# Patient Record
Sex: FEMALE | Race: WHITE | NOT HISPANIC OR LATINO | Employment: OTHER | URBAN - METROPOLITAN AREA
[De-identification: names, ages, dates, MRNs, and addresses within clinical notes are randomized per-mention and may not be internally consistent; named-entity substitution may affect disease eponyms.]

---

## 2017-02-04 ENCOUNTER — OFFICE VISIT (OUTPATIENT)
Dept: URGENT CARE | Facility: PHYSICIAN GROUP | Age: 72
End: 2017-02-04
Payer: MEDICARE

## 2017-02-04 VITALS
HEIGHT: 66 IN | WEIGHT: 200 LBS | HEART RATE: 106 BPM | DIASTOLIC BLOOD PRESSURE: 58 MMHG | BODY MASS INDEX: 32.14 KG/M2 | OXYGEN SATURATION: 92 % | SYSTOLIC BLOOD PRESSURE: 124 MMHG | RESPIRATION RATE: 16 BRPM | TEMPERATURE: 98.3 F

## 2017-02-04 DIAGNOSIS — N30.90 CYSTITIS: ICD-10-CM

## 2017-02-04 PROCEDURE — 3014F SCREEN MAMMO DOC REV: CPT | Performed by: PHYSICIAN ASSISTANT

## 2017-02-04 PROCEDURE — G8484 FLU IMMUNIZE NO ADMIN: HCPCS | Performed by: PHYSICIAN ASSISTANT

## 2017-02-04 PROCEDURE — 1036F TOBACCO NON-USER: CPT | Performed by: PHYSICIAN ASSISTANT

## 2017-02-04 PROCEDURE — 1101F PT FALLS ASSESS-DOCD LE1/YR: CPT | Performed by: PHYSICIAN ASSISTANT

## 2017-02-04 PROCEDURE — G8419 CALC BMI OUT NRM PARAM NOF/U: HCPCS | Performed by: PHYSICIAN ASSISTANT

## 2017-02-04 PROCEDURE — 99214 OFFICE O/P EST MOD 30 MIN: CPT | Performed by: PHYSICIAN ASSISTANT

## 2017-02-04 PROCEDURE — G8432 DEP SCR NOT DOC, RNG: HCPCS | Performed by: PHYSICIAN ASSISTANT

## 2017-02-04 PROCEDURE — 3017F COLORECTAL CA SCREEN DOC REV: CPT | Mod: 8P | Performed by: PHYSICIAN ASSISTANT

## 2017-02-04 PROCEDURE — 4040F PNEUMOC VAC/ADMIN/RCVD: CPT | Mod: 8P | Performed by: PHYSICIAN ASSISTANT

## 2017-02-04 RX ORDER — CIPROFLOXACIN 500 MG/1
500 TABLET, FILM COATED ORAL 2 TIMES DAILY
Qty: 14 TAB | Refills: 0 | Status: SHIPPED | OUTPATIENT
Start: 2017-02-04 | End: 2017-02-11

## 2017-02-04 ASSESSMENT — PATIENT HEALTH QUESTIONNAIRE - PHQ9: CLINICAL INTERPRETATION OF PHQ2 SCORE: 0

## 2017-02-05 NOTE — PROGRESS NOTES
Subjective:      Judy Bland is a 71 y.o. female who presents with Dysuria            Dysuria       Chief Complaint   Patient presents with   • Dysuria     x3 days took over the counter medicine now worse as of 12pm today       HPI:  Judy Bland is a 71 y.o. female who presents with  who presents with dysuria and increased frequency and urgency x 3 days.  Took otc Azo and gentamicin BID x 2 days.  This improved slightly an now it has worsened.  No fever or chills..  Patient denies HA, SOB, chest pain, palpitations, fever, chills, or n/v/d.      Past Medical History   Diagnosis Date   • Arthritis    • Heart burn    • Hepatitis B    • Hypertension    • Acquired polycythemia 2/15/2012   • Benign essential hypertension 2/15/2012   • Difficulty breathing 2/15/2012   • Cough 2/15/2012   • Hypercholesterolemia 2/15/2012   • Murmur 2/15/2012   • Obesity 2/15/2012   • Sinus tachycardia 2/15/2012   • Arthropathy 2/15/2012   • Chronic hepatitis B 2/15/2012   • Esophageal reflux 2/15/2012   • Hyperlipidemia 2/15/2012   • Jaundice    • Indigestion    • Anesthesia      PONV   • BACILIO (obstructive sleep apnea) 2/15/2012       Past Surgical History   Procedure Laterality Date   • Cataract extraction with iol  2008     [facility]   • Rotator cuff repair  2005     left   • Cervical conization       Loop Electrode Excision   • Primary c section     • Knee arthroplasty total  4/8/08     Performed by ROHAN BLACKWELL at SURGERY [facility]   • Knee arthroplasty total  4/8/08     Performed by ROHAN BLACKWELL at SURGERY [facility]   • Carpal tunnel release  2009     b/L   • Trigger finger release  2005     left   • Trigger finger release  11/23/2009     Performed by MEHDI ONEIL at SURGERY [facility]   • Hand surgery     • Septoplasty  3/12/2012     Performed by MEHDI SWAIN at SURGERY SAME DAY [facility]   • Turbinoplasty  3/12/2012     Performed by MEHDI SWAIN at SURGERY SAME DAY [facility]   •  Sinusotomies  3/12/2012     Performed by MEHDI SWAIN at SURGERY SAME DAY [facility]       Family History   Problem Relation Age of Onset   • Diabetes     • Heart Disease     • Hypertension     • Stroke     • Other Mother      Family hx of Coronary Arteriosclerosis   • Other Father      Family hx of Coronary Arteriosclerosis       Social History     Social History   • Marital Status:      Spouse Name: N/A   • Number of Children: N/A   • Years of Education: N/A     Occupational History   • Not on file.     Social History Main Topics   • Smoking status: Never Smoker    • Smokeless tobacco: Never Used   • Alcohol Use: No   • Drug Use: No   • Sexual Activity: Not on file     Other Topics Concern   • Not on file     Social History Narrative         Current outpatient prescriptions:   •  diltiazem, 60 mg, Oral, Q12HRS  •  doxepin,   •  montelukast, 10 mg, Oral, Q EVENING  •  metformin, 500 mg, Oral, DAILY  •  Doxepin HCl, 3 Tab, Oral, QHS  •  diltiazem, TAKE ONE TABLET BY MOUTH EVERY 12 HOURS  •  atorvastatin,   •  lisinopril-hydrochlorothiazide,   •  budesonide-formoterol, 2 Puff, Inhalation, BID  •  ranitidine, 150 mg, Oral, DAILY  •  Cetirizine HCl (ZYRTEC PO), Take  by mouth.  •  spironolactone/hctz, TAKE ONE TABLET BY MOUTH EVERY DAY  •  montelukast, 10 mg, Oral, DAILY  •  simvastatin, 20 mg, Oral, DAILY  •  Albuterol Sulfate (VENTOLIN HFA INH), Inhale  by mouth as needed.  •  hydrOXYzine, , not taking  •  pantoprazole, 40 mg, Oral, BID, not taking  •  Acetaminophen (TYLENOL PO), Take  by mouth. EXTRA STRENGH 2 TABS PRN , prn  •  alprazolam, 0.5 mg, Oral, QDAY PRN, prn  •  fluticasone, 1 Spray, Nasal, BID, prn    Allergies   Allergen Reactions   • Ultram [Tramadol Hcl]      ELEVATED LIVER ENZYMES            Review of Systems   Genitourinary: Positive for dysuria.   Review of Systems   Constitutional: Negative for fever, chills and weight loss.   HENT: Negative.    Respiratory: Negative.    Cardiovascular:  "Negative.    Gastrointestinal: Negative.  Negative for nausea, vomiting and abdominal pain.   Genitourinary: Positive for dysuria, urgency and frequency. Negative for hematuria and flank pain.   Musculoskeletal: Negative for back pain.   Skin: Negative.    Neurological: Negative.    Endo/Heme/Allergies: Negative.    Psychiatric/Behavioral: Negative.             Objective:     /58 mmHg  Pulse 106  Temp(Src) 36.8 °C (98.3 °F)  Resp 16  Ht 1.676 m (5' 6\")  Wt 90.719 kg (200 lb)  BMI 32.30 kg/m2  SpO2 92%  Breastfeeding? No     Physical Exam       Constitutional:   Appropriately groomed, pleasant affect, well nourished, in NAD.    Head:   Normocephalic, atraumatic.    Eyes:   EOM's full, sclera white, conjunctiva not erythematous, and medial canthus without exudate bilaterally.    Throat:  Dentition wnl, mucosa moist without lesions.      Lungs:  Respiratory effort not labored without accessory muscle use.      Abdominal:  Abdomen soft to palpation with mild suprapubic ttp.  No masses or hepatosplenomegaly.  No CVA tenderness bilaterally to percussion.    Musculoskeletal:  Gait nonantalgic with a narrow base.    Derm:  Skin without rashes or lesions with good turgor pressure.      Psychiatric:  Mood, affect, and judgement appropriate.       Assessment/Plan:     1. Cystitis  Patient presents with uti x 3 days.  Self treated with 2 days of gentamicin [place].  On exam slight ttp over suprapubic region.  UA pos for leuk and blood.  Sent urine for culture.  Rx cipro.  Reviewed symptom support measures.    Patient was in agreement with this treatment plan and seemed to understand without barriers. All questions were encouraged and answered.  Reviewed signs and symptoms of when to seek emergency medical care.     Please note that this dictation was created using voice recognition software.  I have made every reasonable attempt to correct obvious errors, but I expect there are errors of [place] " and possibly content that I did not discover before finalizing the note.   - ciprofloxacin (CIPRO) 500 MG Tab; Take 1 Tab by mouth 2 times a day for 7 days.  Dispense: 14 Tab; Refill: 0  - URINE CULTURE(NEW); Future

## 2017-02-05 NOTE — PATIENT INSTRUCTIONS
You have a urinary tract infection.  Please  your antibiotic from the pharmacy.  Eat yogurt with each dosing and for several days after finishing your antibiotic.  Drink plenty of fluids and empty your bladder often.  Try acidic juices to lower your urine pH.  I recommend craneberry, promegranate, and cherry juices.  Go to the ER if you develop fever of 101F or greater, flank pain, blood in your urine, or nausea and/or vomiting.

## 2017-02-10 ENCOUNTER — HOSPITAL ENCOUNTER (OUTPATIENT)
Facility: MEDICAL CENTER | Age: 72
End: 2017-02-10
Attending: PLASTIC SURGERY
Payer: MEDICARE

## 2017-02-10 PROCEDURE — 88305 TISSUE EXAM BY PATHOLOGIST: CPT

## 2017-03-16 ENCOUNTER — TELEPHONE (OUTPATIENT)
Dept: CARDIOLOGY | Facility: MEDICAL CENTER | Age: 72
End: 2017-03-16

## 2017-03-16 ENCOUNTER — OFFICE VISIT (OUTPATIENT)
Dept: CARDIOLOGY | Facility: MEDICAL CENTER | Age: 72
End: 2017-03-16
Payer: MEDICARE

## 2017-03-16 ENCOUNTER — HOSPITAL ENCOUNTER (OUTPATIENT)
Dept: RADIOLOGY | Facility: MEDICAL CENTER | Age: 72
End: 2017-03-16
Attending: INTERNAL MEDICINE
Payer: MEDICARE

## 2017-03-16 VITALS
HEIGHT: 66 IN | OXYGEN SATURATION: 91 % | SYSTOLIC BLOOD PRESSURE: 112 MMHG | BODY MASS INDEX: 34.07 KG/M2 | DIASTOLIC BLOOD PRESSURE: 58 MMHG | HEART RATE: 123 BPM | WEIGHT: 212 LBS

## 2017-03-16 DIAGNOSIS — R06.09 DOE (DYSPNEA ON EXERTION): ICD-10-CM

## 2017-03-16 DIAGNOSIS — E78.00 HYPERCHOLESTEROLEMIA: Chronic | ICD-10-CM

## 2017-03-16 DIAGNOSIS — R00.0 SINUS TACHYCARDIA: Chronic | ICD-10-CM

## 2017-03-16 DIAGNOSIS — I10 BENIGN ESSENTIAL HYPERTENSION: Chronic | ICD-10-CM

## 2017-03-16 LAB — EKG IMPRESSION: NORMAL

## 2017-03-16 PROCEDURE — 71020 DX-CHEST-2 VIEWS: CPT

## 2017-03-16 PROCEDURE — 4040F PNEUMOC VAC/ADMIN/RCVD: CPT | Mod: 8P | Performed by: INTERNAL MEDICINE

## 2017-03-16 PROCEDURE — 1036F TOBACCO NON-USER: CPT | Performed by: INTERNAL MEDICINE

## 2017-03-16 PROCEDURE — G8419 CALC BMI OUT NRM PARAM NOF/U: HCPCS | Performed by: INTERNAL MEDICINE

## 2017-03-16 PROCEDURE — 3014F SCREEN MAMMO DOC REV: CPT | Performed by: INTERNAL MEDICINE

## 2017-03-16 PROCEDURE — 93000 ELECTROCARDIOGRAM COMPLETE: CPT | Performed by: INTERNAL MEDICINE

## 2017-03-16 PROCEDURE — G8484 FLU IMMUNIZE NO ADMIN: HCPCS | Performed by: INTERNAL MEDICINE

## 2017-03-16 PROCEDURE — 99214 OFFICE O/P EST MOD 30 MIN: CPT | Performed by: INTERNAL MEDICINE

## 2017-03-16 PROCEDURE — G8432 DEP SCR NOT DOC, RNG: HCPCS | Performed by: INTERNAL MEDICINE

## 2017-03-16 PROCEDURE — 1101F PT FALLS ASSESS-DOCD LE1/YR: CPT | Performed by: INTERNAL MEDICINE

## 2017-03-16 PROCEDURE — 3017F COLORECTAL CA SCREEN DOC REV: CPT | Mod: 8P | Performed by: INTERNAL MEDICINE

## 2017-03-16 ASSESSMENT — ENCOUNTER SYMPTOMS
MYALGIAS: 0
SHORTNESS OF BREATH: 1
PND: 0
DIZZINESS: 0
ORTHOPNEA: 0
PALPITATIONS: 0
LOSS OF CONSCIOUSNESS: 0

## 2017-03-16 NOTE — PROGRESS NOTES
Subjective:   Judy Bland is a 71 y.o. female who presents today hypertension, tachycardia with a history of asthma, obstructive sleep apnea and exertional shortness of breath, hyperlipidemia.    Last seen on 3/11/2016.    No specific cardiac symptoms though does have some exertional shortness of breath which generally has been chronic.  Has asthma uses daily inhalers.  No specific angina.  Tries to walk some every day  No PND orthopnea or lower extremity edema.  Compliant with her medications.    In December, 2014, saw her pulmonologist.  PFTs showed fracture of being overweight but no obstructive disease.  Takes medicines for asthma diagnosed 3 years ago.  A lot of problems with Kenalog causing eczema than had to be on long-term prednisone.    No history of CAD or angina.  Previous echocardiograms had shown left ventricular hypertrophy but no valve disease. Right heart pressures could not be evaluated.  Had a remote stress echocardiogram that was apparently unremarkable.    Past Medical History   Diagnosis Date   • Arthritis    • Heart burn    • Hepatitis B    • Hypertension    • Acquired polycythemia 2/15/2012   • Benign essential hypertension 2/15/2012   • Difficulty breathing 2/15/2012   • Cough 2/15/2012   • Hypercholesterolemia 2/15/2012   • Murmur 2/15/2012   • Obesity 2/15/2012   • Sinus tachycardia 2/15/2012   • Arthropathy 2/15/2012   • Chronic hepatitis B (CMS-HCC) 2/15/2012   • Esophageal reflux 2/15/2012   • Hyperlipidemia 2/15/2012   • Jaundice    • Indigestion    • Anesthesia      PONV   • BACILIO (obstructive sleep apnea) 2/15/2012     Past Surgical History   Procedure Laterality Date   • Cataract extraction with iol  2008     [facility]   • Rotator cuff repair  2005     left   • Cervical conization       Loop Electrode Excision   • Primary c section     • Knee arthroplasty total  4/8/08     Performed by ROHAN BLACKWELL at SURGERY [facility]   • Knee arthroplasty total  4/8/08     Performed by  ROHAN BLACKWELL at SURGERY [facility]   • Carpal tunnel release  2009     b/L   • Trigger finger release  2005     left   • Trigger finger release  11/23/2009     Performed by MEHDI ONEIL at SURGERY [facility]   • Hand surgery     • Septoplasty  3/12/2012     Performed by MEHDI SWAIN at SURGERY SAME DAY [facility]   • Turbinoplasty  3/12/2012     Performed by MEHDI SWAIN at SURGERY SAME DAY [facility]   • Sinusotomies  3/12/2012     Performed by MEHDI SWAIN at SURGERY SAME DAY [facility]     Family History   Problem Relation Age of Onset   • Diabetes     • Heart Disease     • Hypertension     • Stroke     • Other Mother      Family hx of Coronary Arteriosclerosis   • Other Father      Family hx of Coronary Arteriosclerosis     History   Smoking status   • Never Smoker    Smokeless tobacco   • Never Used     Allergies   Allergen Reactions   • Ultram [Tramadol Hcl]      ELEVATED LIVER ENZYMES     Outpatient Encounter Prescriptions as of 3/16/2017   Medication Sig Dispense Refill   • diltiazem (CARDIZEM) 60 MG Tab Take 1 Tab by mouth every 12 hours. 180 Tab 3   • doxepin (SINEQUAN) 25 MG Cap      • montelukast (SINGULAIR) 10 MG Tab Take 1 Tab by mouth every evening. 90 Tab 3   • metformin (GLUCOPHAGE) 500 MG Tab Take 500 mg by mouth every day.     • Doxepin HCl 3 MG Tab Take 3 Tabs by mouth every bedtime.     • diltiazem (CARDIZEM) 60 MG Tab TAKE ONE TABLET BY MOUTH EVERY 12 HOURS 180 Tab 3   • atorvastatin (LIPITOR) 20 MG Tab      • lisinopril-hydrochlorothiazide (PRINZIDE, ZESTORETIC) 20-12.5 MG per tablet      • budesonide-formoterol (SYMBICORT) 160-4.5 MCG/ACT AERO Inhale 2 Puffs by mouth 2 Times a Day. RINSE MOUTH AFTER USE     • ranitidine (ZANTAC) 150 MG TABS Take 150 mg by mouth every day.     • Cetirizine HCl (ZYRTEC PO) Take  by mouth.     • spironolactone/hctz (ALDACTAZIDE) 25-25 MG TABS TAKE ONE TABLET BY MOUTH EVERY DAY 90 Tab 1   • montelukast (SINGULAIR) 10 MG TABS Take 10  "mg by mouth every day.       • simvastatin (ZOCOR) 20 MG TABS Take 1 Tab by mouth every day. 30 Each 11   • Acetaminophen (TYLENOL PO) Take  by mouth. EXTRA STRENGH 2 TABS PRN      • Albuterol Sulfate (VENTOLIN HFA INH) Inhale  by mouth as needed.     • alprazolam (XANAX) 0.5 MG TABS Take 0.5 mg by mouth 1 time daily as needed.     • fluticasone (FLONASE) 50 MCG/ACT nasal spray Spray 1 Spray in nose 2 times a day. Each Nostril      • hydrOXYzine (ATARAX) 10 MG Tab      • pantoprazole (PROTONIX) 40 MG TBEC Take 40 mg by mouth 2 times a day.         No facility-administered encounter medications on file as of 3/16/2017.     Review of Systems   Respiratory: Positive for shortness of breath.    Cardiovascular: Negative for chest pain, palpitations, orthopnea, leg swelling and PND.   Musculoskeletal: Negative for myalgias.   Neurological: Negative for dizziness and loss of consciousness.        Objective:   /58 mmHg  Pulse 123  Ht 1.676 m (5' 5.98\")  Wt 96.163 kg (212 lb)  BMI 34.23 kg/m2  SpO2 91%    Physical Exam   Constitutional: She is oriented to person, place, and time. She appears well-nourished. No distress.   HENT:   Head: Normocephalic and atraumatic.   Mouth/Throat: Mucous membranes are normal.   Eyes: EOM are normal.   Neck: No JVD present. Carotid bruit is not present.   Slightly elevated jugular venous pressure.   Cardiovascular: Normal rate, regular rhythm, S1 normal and S2 normal.  Exam reveals no gallop and no friction rub.    No murmur heard.  Pulses:       Carotid pulses are 2+ on the right side, and 2+ on the left side.       Radial pulses are 2+ on the right side, and 2+ on the left side.        Posterior tibial pulses are 1+ on the right side, and 1+ on the left side.       Pulmonary/Chest: Effort normal. She has no wheezes. She has no rhonchi. She has rales.   Increased AP diameter.  Decreased breath sounds.   Abdominal: Soft. Bowel sounds are normal. She exhibits no abdominal bruit, no " pulsatile midline mass and no mass. There is no hepatosplenomegaly. There is no tenderness.   Obese.   Musculoskeletal: She exhibits no edema.   Neurological: She is alert and oriented to person, place, and time. She has normal strength. Gait normal.   Skin: Skin is warm and dry. No cyanosis. Nails show no clubbing.   Psychiatric: She has a normal mood and affect. Her behavior is normal.       Assessment:     1. Sinus tachycardia  EKG   2. Benign essential hypertension     3. Hypercholesterolemia     4. LOLYD (dyspnea on exertion)  DX-CHEST-2 VIEWS     03/4/2011 echocardiogram:  EF greater than 65%. Mild to moderate left hypertrophy. Pulmonary pressures cannot be assessed.    03/06/2015 ECHOCARDIOGRAM  Normal left ventricular systolic function.  Normal regional wall motion with vigorous global contractility.  Left ventricular ejection fraction is 65% to 70%.  Normal right ventricular systolic function.  Aortic sclerosis without stenosis.  Right ventricular systolic pressure is estimated to be 24-29 mmHg.  Anterior echo free space with some enhanced echogenicity of the   posterior pericardium; uncertain significance.     01/18/2016 Cholesterol 159. Triglycerides 155. HDL 61. LDL 67.    03/16/2017 EKG: Normal sinus rhythm, rate 99. Isolated PVC.    Medical Decision Making:  Today's Assessment / Status / Plan:     Shortness of breath with exertion. Probably multifactorial predominantly pulmonary.    Hypertension. Controlled.    Hyperlipidemia. I reviewed her most recent lipid panel with her. On simvastatin.    Asthma.    Sleep apnea on CPAP.    Recommendations  CXR.  Continue current therapy.  Follow-up one year, sooner if necessary.

## 2017-03-16 NOTE — MR AVS SNAPSHOT
"        Judy Bland   3/16/2017 1:00 PM   Office Visit   MRN: [id number]    Department:  [facility]   Dept Phone:  [phone]    Description:  Female : 1945   Provider:  Lit Irby M.D.           Reason for Visit     Follow-Up           Allergies as of 3/16/2017     Allergen Noted Reactions    Ultram [Tramadol Hcl] 2011       ELEVATED LIVER ENZYMES      You were diagnosed with     Sinus tachycardia   [131718]       Benign essential hypertension   [578200]       Hypercholesterolemia   [392126]       LLOYD (dyspnea on exertion)   [094780]         Vital Signs     Blood Pressure Pulse Height Weight Body Mass Index Oxygen Saturation    112/58 mmHg 123 1.676 m (5' 5.98\") 96.163 kg (212 lb) 34.23 kg/m2 91%    Smoking Status                   Never Smoker            Basic Information     Date Of Birth Sex Race Ethnicity Preferred Language    1945 Female White Non- English      Your appointments     Mar 24, 2017  2:00 PM   MA [id number] with [facility] MG 3   [facility] ([address])    [address]  [place] [address]   [phone]           No deodorant, powder, perfume or lotion under the arm or breast area.            2017  1:00 PM   Established Patient Pul with MARKOS Kaplan   [facility] (--)    [address]  [address]  [place] [address] [phone]              Problem List              ICD-10-CM Priority Class Noted - Resolved    Acquired polycythemia (Chronic) D75.1   2/15/2012 - Present    Benign essential hypertension (Chronic) I10   2/15/2012 - Present    Difficulty breathing R06.89   2/15/2012 - Present    Cough (Chronic) R05   2/15/2012 - Present    Hypercholesterolemia (Chronic) E78.00   2/15/2012 - Present    Murmur (Chronic) R01.1   2/15/2012 - Present    Obesity (Chronic) E66.9   2/15/2012 - Present    BACILIO (obstructive sleep apnea) (Chronic) G47.33   2/15/2012 - Present    Sinus " tachycardia (Chronic) R00.0   2/15/2012 - Present    Arthropathy (Chronic) M12.9   2/15/2012 - Present    Chronic hepatitis B (CMS-HCC) (Chronic) B18.1   2/15/2012 - Present    Esophageal reflux (Chronic) K21.9   2/15/2012 - Present    Hyperlipidemia (Chronic) E78.5   2/15/2012 - Present    Shortness of breath R06.02   2/26/2015 - Present    Allergic rhinitis due to allergen J30.9   6/13/2016 - Present    LLOYD (dyspnea on exertion) R06.09   3/16/2017 - Present      Health Maintenance        Date Due Completion Dates    IMM DTaP/Tdap/Td Vaccine (1 - Tdap) 8/20/1964 ---    PAP SMEAR 8/20/1966 ---    COLONOSCOPY 8/20/1995 ---    IMM ZOSTER VACCINE 8/20/2005 ---    IMM PNEUMOCOCCAL 65+ (ADULT) LOW/MEDIUM RISK SERIES (1 of 2 - PCV13) 8/20/2010 ---    IMM INFLUENZA (1) 9/1/2016 ---    MAMMOGRAM 3/4/2017 3/4/2016, 2/25/2015, 2/20/2014, 2/7/2013, 2/3/2012, 1/27/2011, 1/22/2010, 1/22/2010, 1/21/2009, 1/21/2009, 1/17/2008, 1/17/2008, 1/15/2007, 1/11/2006, 1/10/2005    BONE DENSITY 12/29/2019 12/29/2014, 4/7/2009            Results       Current Immunizations     No immunizations on file.      Below and/or attached are the medications your provider expects you to take. Review all of your home medications and newly ordered medications with your provider and/or pharmacist. Follow medication instructions as directed by your provider and/or pharmacist. Please keep your medication list with you and share with your provider. Update the information when medications are discontinued, doses are changed, or new medications (including over-the-counter products) are added; and carry medication information at all times in the event of emergency situations     Allergies:  ULTRAM - (reactions not documented)               Medications  Valid as of: March 16, 2017 -  1:51 PM    Generic Name Brand Name Tablet Size Instructions for use    Acetaminophen   Take  by mouth. EXTRA STRENGH 2 TABS PRN         Albuterol Sulfate   Inhale  by mouth as  needed.        ALPRAZolam (Tab) XANAX 0.5 MG Take 0.5 mg by mouth 1 time daily as needed.        Atorvastatin Calcium (Tab) LIPITOR 20 MG         Budesonide-Formoterol Fumarate (Aerosol) SYMBICORT 160-4.5 MCG/ACT Inhale 2 Puffs by mouth 2 Times a Day. RINSE MOUTH AFTER USE        Cetirizine HCl   Take  by mouth.        DiltiaZEM HCl (Tab) CARDIZEM 60 MG TAKE ONE TABLET BY MOUTH EVERY 12 HOURS        DiltiaZEM HCl (Tab) CARDIZEM 60 MG Take 1 Tab by mouth every 12 hours.        Doxepin HCl (Tab) Doxepin HCl 3 MG Take 3 Tabs by mouth every bedtime.        Doxepin HCl (Cap) SINEQUAN 25 MG         Fluticasone Propionate (Suspension) FLONASE 50 MCG/ACT Spray 1 Spray in nose 2 times a day. Each Nostril         HydrOXYzine HCl (Tab) ATARAX 10 MG         Lisinopril-Hydrochlorothiazide (Tab) PRINZIDE, ZESTORETIC 20-12.5 MG         MetFORMIN HCl (Tab) GLUCOPHAGE 500 MG Take 500 mg by mouth every day.        Montelukast Sodium (Tab) SINGULAIR 10 MG Take 10 mg by mouth every day.          Montelukast Sodium (Tab) SINGULAIR 10 MG Take 1 Tab by mouth every evening.        Pantoprazole Sodium (Tablet Delayed Response) PROTONIX 40 MG Take 40 mg by mouth 2 times a day.          RaNITidine HCl (Tab) ZANTAC 150 MG Take 150 mg by mouth every day.        Simvastatin (Tab) ZOCOR 20 MG Take 1 Tab by mouth every day.        Spironolactone-HCTZ (Tab) ALDACTAZIDE 25-25 MG TAKE ONE TABLET BY MOUTH EVERY DAY        .                 Medicines prescribed today were sent to:     [id number] [place] - [address] AT [address] [address]    Phone: [phone] Fax: [phone]    Open 24 Hours?: No    [facility] [place] - [address] AT PORTAL TO REGISTERED [place] [address] [facility] [address]    Phone: [phone] Fax: [phone]    Open 24 Hours?: No      Medication refill instructions:       If your prescription bottle indicates you have  medication refills left, it is not necessary to call your provider’s office. Please contact your pharmacy and they will refill your medication.    If your prescription bottle indicates you do not have any refills left, you may request refills at any time through one of the following ways: The online "[employer] system (except Urgent Care), by calling your provider’s office, or by asking your pharmacy to contact your provider’s office with a refill request. Medication refills are processed only during regular business hours and may not be available until the next business day. Your provider may request additional information or to have a follow-up visit with you prior to refilling your medication.   *Please Note: Medication refills are assigned a new Rx number when refilled electronically. Your pharmacy may indicate that no refills were authorized even though a new prescription for the same medication is available at the pharmacy. Please request the medicine by name with the pharmacy before contacting your provider for a refill.        Your To Do List     Future Labs/Procedures Complete By Expires    DX-CHEST-2 VIEWS  As directed 9/14/2017      Other Notes About Your Plan     [facility]. [phone] Fax. [phone]             "[employer] Access Code: [email or web address]  Expires: 4/15/2017  1:51 PM    "[employer]  A secure, online tool to manage your health information     [employer]’s "[employer]® is a secure, online tool that connects you to your personalized health information from the privacy of your home -- day or night - making it very easy for you to manage your healthcare. Once the activation process is completed, you can even access your medical information using the "[employer] sebastian, which is available for free in the Apple Sebastian store or Google Play store.     "[employer] provides the following levels of access (as shown below):   My Chart Features   [facility] Primary Care Doctor [facility]  Specialists  [facility]  Urgent  Care [facility]  Primary Care  Doctor   Email your healthcare team securely and privately 24/7 X X X    Manage appointments: schedule your next appointment; view details of past/upcoming appointments X      Request prescription refills. X      View recent personal medical records, including lab and immunizations X X X X   View health record, including health history, allergies, medications X X X X   Read reports about your outpatient visits, procedures, consult and ER notes X X X X   See your discharge summary, which is a recap of your hospital and/or ER visit that includes your diagnosis, lab results, and care plan. X X       How to register for [employer]:  1. Go to  [email or web address].  2. Click on the Sign Up Now box, which takes you to the New Member Sign Up page. You will need to provide the following information:  a. Enter your [employer] Access Code exactly as it appears at the top of this page. (You will not need to use this code after you’ve completed the sign-up process. If you do not sign up before the expiration date, you must request a new code.)   b. Enter your date of birth.   c. Enter your home email address.   d. Click Submit, and follow the next screen’s instructions.  3. Create a [employer] ID. This will be your [employer] login ID and cannot be changed, so think of one that is secure and easy to remember.  4. Create a [employer] password. You can change your password at any time.  5. Enter your Password Reset Question and Answer. This can be used at a later time if you forget your password.   6. Enter your e-mail address. This allows you to receive e-mail notifications when new information is available in [employer].  7. Click Sign Up. You can now view your health information.    For assistance activating your [employer] account, call [phone]

## 2017-03-16 NOTE — Clinical Note
[facility]   [address] [place] [address]  [place] [address]  Phone: [phone]  Fax: [phone]              Judy Bland  1945    Encounter Date: 3/16/2017    Lit Irby M.D.          PROGRESS NOTE:  Subjective:   Judy Bland is a 71 y.o. female who presents today hypertension, tachycardia with a history of asthma, obstructive sleep apnea and exertional shortness of breath, hyperlipidemia.    Last seen on 3/11/2016.    No specific cardiac symptoms though does have some exertional shortness of breath which generally has been chronic.  Has asthma uses daily inhalers.  No specific angina.  Tries to walk some every day  No PND orthopnea or lower extremity edema.  Compliant with her medications.    In December, 2014, saw her pulmonologist.  PFTs showed fracture of being overweight but no obstructive disease.  Takes medicines for asthma diagnosed 3 years ago.  A lot of problems with Kenalog causing eczema than had to be on long-term prednisone.    No history of CAD or angina.  Previous echocardiograms had shown left ventricular hypertrophy but no valve disease. Right heart pressures could not be evaluated.  Had a remote stress echocardiogram that was apparently unremarkable.    Past Medical History   Diagnosis Date   • Arthritis    • Heart burn    • Hepatitis B    • Hypertension    • Acquired polycythemia 2/15/2012   • Benign essential hypertension 2/15/2012   • Difficulty breathing 2/15/2012   • Cough 2/15/2012   • Hypercholesterolemia 2/15/2012   • Murmur 2/15/2012   • Obesity 2/15/2012   • Sinus tachycardia 2/15/2012   • Arthropathy 2/15/2012   • Chronic hepatitis B (CMS-HCC) 2/15/2012   • Esophageal reflux 2/15/2012   • Hyperlipidemia 2/15/2012   • Jaundice    • Indigestion    • Anesthesia      PONV   • BACILIO (obstructive sleep apnea) 2/15/2012     Past Surgical History   Procedure Laterality Date   • Cataract extraction with iol  2008     OU   • Rotator cuff repair  2005     left   • Cervical conization       Loop Electrode Excision   • Primary c section     • Knee arthroplasty total  4/8/08     Performed by ROHAN BLACKWELL at SUR[facility]S   • Knee arthroplasty total  4/8/08     Performed by ROHAN BLACKWELL at SUR[facility]ERY [facility] ORS   • Carpal tunnel release  2009     b/L   • Trigger finger release  2005     left   • Trigger finger release  11/23/2009     Performed by MEHDI ONEIL at [facility]   • Hand surgery     • Septoplasty  3/12/2012     Performed by MEHDI SWAIN at [facility]   • Turbinoplasty  3/12/2012     Performed by MEHDI SWAIN at [facility]   • Sinusotomies  3/12/2012     Performed by MEHDI SWAIN at [facility]     Family History   Problem Relation Age of Onset   • Diabetes     • Heart Disease     • Hypertension     • Stroke     • Other Mother      Family hx of Coronary Arteriosclerosis   • Other Father      Family hx of Coronary Arteriosclerosis     History   Smoking status   • Never Smoker    Smokeless tobacco   • Never Used     Allergies   Allergen Reactions   • Ultram [Tramadol Hcl]      ELEVATED LIVER ENZYMES     Outpatient Encounter Prescriptions as of 3/16/2017   Medication Sig Dispense Refill   • diltiazem (CARDIZEM) 60 MG Tab Take 1 Tab by mouth every 12 hours. 180 Tab 3   • doxepin (SINEQUAN) 25 MG Cap      • montelukast (SINGULAIR) 10 MG Tab Take 1 Tab by mouth every evening. 90 Tab 3   • metformin (GLUCOPHAGE) 500 MG Tab Take 500 mg by mouth every day.     • Doxepin HCl 3 MG Tab Take 3 Tabs by mouth every bedtime.     • diltiazem (CARDIZEM) 60 MG Tab TAKE ONE TABLET BY MOUTH EVERY 12 HOURS 180 Tab 3   • atorvastatin (LIPITOR) 20 MG Tab      • lisinopril-hydrochlorothiazide (PRINZIDE, ZESTORETIC) 20-12.5 MG per tablet      • budesonide-formoterol (SYMBICORT) 160-4.5 MCG/ACT AERO Inhale 2 Puffs by mouth 2 Times a Day. RINSE MOUTH AFTER  "USE     • ranitidine (ZANTAC) 150 MG TABS Take 150 mg by mouth every day.     • Cetirizine HCl (ZYRTEC PO) Take  by mouth.     • spironolactone/hctz (ALDACTAZIDE) 25-25 MG TABS TAKE ONE TABLET BY MOUTH EVERY DAY 90 Tab 1   • montelukast (SINGULAIR) 10 MG TABS Take 10 mg by mouth every day.       • simvastatin (ZOCOR) 20 MG TABS Take 1 Tab by mouth every day. 30 Each 11   • Acetaminophen (TYLENOL PO) Take  by mouth. EXTRA STRENGH 2 TABS PRN      • Albuterol Sulfate (VENTOLIN HFA INH) Inhale  by mouth as needed.     • alprazolam (XANAX) 0.5 MG TABS Take 0.5 mg by mouth 1 time daily as needed.     • fluticasone (FLONASE) 50 MCG/ACT nasal spray Spray 1 Spray in nose 2 times a day. Each Nostril      • hydrOXYzine (ATARAX) 10 MG Tab      • pantoprazole (PROTONIX) 40 MG TBEC Take 40 mg by mouth 2 times a day.         No facility-administered encounter medications on file as of 3/16/2017.     Review of Systems   Respiratory: Positive for shortness of breath.    Cardiovascular: Negative for chest pain, palpitations, orthopnea, leg swelling and PND.   Musculoskeletal: Negative for myalgias.   Neurological: Negative for dizziness and loss of consciousness.        Objective:   /58 mmHg  Pulse 123  Ht 1.676 m (5' 5.98\")  Wt 96.163 kg (212 lb)  BMI 34.23 kg/m2  SpO2 91%    Physical Exam   Constitutional: She is oriented to person, place, and time. She appears well-nourished. No distress.   HENT:   Head: Normocephalic and atraumatic.   Mouth/Throat: Mucous membranes are normal.   Eyes: EOM are normal.   Neck: No JVD present. Carotid bruit is not present.   Slightly elevated jugular venous pressure.   Cardiovascular: Normal rate, regular rhythm, S1 normal and S2 normal.  Exam reveals no gallop and no friction rub.    No murmur heard.  Pulses:       Carotid pulses are 2+ on the right side, and 2+ on the left side.       Radial pulses are 2+ on the right side, and 2+ on the left side.        Posterior tibial pulses are 1+ " on the right side, and 1+ on the left side.       Pulmonary/Chest: Effort normal. She has no wheezes. She has no rhonchi. She has rales.   Increased AP diameter.  Decreased breath sounds.   Abdominal: Soft. Bowel sounds are normal. She exhibits no abdominal bruit, no pulsatile midline mass and no mass. There is no hepatosplenomegaly. There is no tenderness.   Obese.   Musculoskeletal: She exhibits no edema.   Neurological: She is alert and oriented to person, place, and time. She has normal strength. Gait normal.   Skin: Skin is warm and dry. No cyanosis. Nails show no clubbing.   Psychiatric: She has a normal mood and affect. Her behavior is normal.       Assessment:     1. Sinus tachycardia  EKG   2. Benign essential hypertension     3. Hypercholesterolemia     4. LLOYD (dyspnea on exertion)  DX-CHEST-2 VIEWS     03/4/2011 echocardiogram:  EF greater than 65%. Mild to moderate left hypertrophy. Pulmonary pressures cannot be assessed.    03/06/2015 ECHOCARDIOGRAM  Normal left ventricular systolic function.  Normal regional wall motion with vigorous global contractility.  Left ventricular ejection fraction is 65% to 70%.  Normal right ventricular systolic function.  Aortic sclerosis without stenosis.  Right ventricular systolic pressure is estimated to be 24-29 mmHg.  Anterior echo free space with some enhanced echogenicity of the   posterior pericardium; uncertain significance.     01/18/2016 Cholesterol 159. Triglycerides 155. HDL 61. LDL 67.    03/16/2017 EKG: Normal sinus rhythm, rate 99. Isolated PVC.    Medical Decision Making:  Today's Assessment / Status / Plan:     Shortness of breath with exertion. Probably multifactorial predominantly pulmonary.    Hypertension. Controlled.    Hyperlipidemia. I reviewed her most recent lipid panel with her. On simvastatin.    Asthma.    Sleep apnea on CPAP.    Recommendations  CXR.  Continue current therapy.  Follow-up one year, sooner if necessary.          Ashli Brown,  A.P.N.  [address]  [place] [address]  VIA Facsimile: [phone]

## 2017-03-24 ENCOUNTER — HOSPITAL ENCOUNTER (OUTPATIENT)
Dept: RADIOLOGY | Facility: MEDICAL CENTER | Age: 72
End: 2017-03-24
Attending: NURSE PRACTITIONER
Payer: MEDICARE

## 2017-03-24 ENCOUNTER — TELEPHONE (OUTPATIENT)
Dept: PULMONOLOGY | Facility: HOSPICE | Age: 72
End: 2017-03-24

## 2017-03-24 DIAGNOSIS — G47.33 OBSTRUCTIVE SLEEP APNEA: ICD-10-CM

## 2017-03-24 DIAGNOSIS — Z13.9 SCREENING: ICD-10-CM

## 2017-03-24 PROCEDURE — 77063 BREAST TOMOSYNTHESIS BI: CPT

## 2017-03-27 DIAGNOSIS — J44.9 CHRONIC OBSTRUCTIVE PULMONARY DISEASE, UNSPECIFIED COPD TYPE (HCC): ICD-10-CM

## 2017-03-27 RX ORDER — BUDESONIDE AND FORMOTEROL FUMARATE DIHYDRATE 160; 4.5 UG/1; UG/1
AEROSOL RESPIRATORY (INHALATION)
Qty: 1 INHALER | Refills: 6 | Status: SHIPPED | OUTPATIENT
Start: 2017-03-27 | End: 2017-04-03 | Stop reason: SDUPTHER

## 2017-03-27 NOTE — TELEPHONE ENCOUNTER
Have we ever prescribed this med? Yes.  If yes, what date? NA    Last OV: 06/13/2016    Next OV: 06/13/2017    DX: COPD    Medications:   Requested Prescriptions     Pending Prescriptions Disp Refills   • SYMBICORT 160-4.5 MCG/ACT Aerosol [Pharmacy Med Name: SYMBICORT -4.5] 1 Inhaler 6     Sig: USE 2 INHALATIONS ORALLY   TWICE DAILY WITH SPACER,   RINSE MOUTH AFTER EACH USE

## 2017-04-03 DIAGNOSIS — J44.9 CHRONIC OBSTRUCTIVE PULMONARY DISEASE, UNSPECIFIED COPD TYPE (HCC): ICD-10-CM

## 2017-04-03 RX ORDER — BUDESONIDE AND FORMOTEROL FUMARATE DIHYDRATE 160; 4.5 UG/1; UG/1
AEROSOL RESPIRATORY (INHALATION)
Qty: 3 INHALER | Refills: 3 | Status: SHIPPED | OUTPATIENT
Start: 2017-04-03 | End: 2018-07-03 | Stop reason: SDUPTHER

## 2017-04-03 NOTE — TELEPHONE ENCOUNTER
Have we ever prescribed this med? Yes.  If yes, what date? 03/27/17    Last OV: 06/13/16    Next OV: 06/13/17    DX: COPD    Medications:   Requested Prescriptions     Pending Prescriptions Disp Refills   • budesonide-formoterol (SYMBICORT) 160-4.5 MCG/ACT Aerosol 3 Inhaler 3     Sig: USE 2 INHALATIONS ORALLY   TWICE DAILY WITH SPACER,   RINSE MOUTH AFTER EACH USE

## 2017-05-02 ENCOUNTER — TELEPHONE (OUTPATIENT)
Dept: PULMONOLOGY | Facility: HOSPICE | Age: 72
End: 2017-05-02

## 2017-05-02 DIAGNOSIS — G47.33 OSA (OBSTRUCTIVE SLEEP APNEA): ICD-10-CM

## 2017-07-12 DIAGNOSIS — I10 ESSENTIAL HYPERTENSION: ICD-10-CM

## 2017-07-12 RX ORDER — DILTIAZEM HYDROCHLORIDE 60 MG/1
TABLET, FILM COATED ORAL
Qty: 180 TAB | Refills: 3 | Status: SHIPPED | OUTPATIENT
Start: 2017-07-12 | End: 2018-03-23

## 2017-07-26 ENCOUNTER — NON-PROVIDER VISIT (OUTPATIENT)
Dept: PULMONOLOGY | Facility: HOSPICE | Age: 72
End: 2017-07-26
Payer: MEDICARE

## 2017-07-26 ENCOUNTER — OFFICE VISIT (OUTPATIENT)
Dept: PULMONOLOGY | Facility: HOSPICE | Age: 72
End: 2017-07-26
Payer: MEDICARE

## 2017-07-26 VITALS
TEMPERATURE: 98.4 F | BODY MASS INDEX: 34.07 KG/M2 | RESPIRATION RATE: 16 BRPM | SYSTOLIC BLOOD PRESSURE: 118 MMHG | WEIGHT: 212 LBS | DIASTOLIC BLOOD PRESSURE: 78 MMHG | OXYGEN SATURATION: 95 % | HEIGHT: 66 IN | HEART RATE: 92 BPM

## 2017-07-26 DIAGNOSIS — J45.30 MILD PERSISTENT ASTHMA WITHOUT COMPLICATION: ICD-10-CM

## 2017-07-26 DIAGNOSIS — R06.02 SHORTNESS OF BREATH: ICD-10-CM

## 2017-07-26 DIAGNOSIS — J30.9 ALLERGIC RHINITIS, UNSPECIFIED ALLERGIC RHINITIS TRIGGER, UNSPECIFIED RHINITIS SEASONALITY: ICD-10-CM

## 2017-07-26 DIAGNOSIS — E66.9 OBESITY (BMI 30-39.9): ICD-10-CM

## 2017-07-26 DIAGNOSIS — G47.33 OSA (OBSTRUCTIVE SLEEP APNEA): Chronic | ICD-10-CM

## 2017-07-26 PROCEDURE — 94726 PLETHYSMOGRAPHY LUNG VOLUMES: CPT | Performed by: INTERNAL MEDICINE

## 2017-07-26 PROCEDURE — 94729 DIFFUSING CAPACITY: CPT | Performed by: INTERNAL MEDICINE

## 2017-07-26 PROCEDURE — 94060 EVALUATION OF WHEEZING: CPT | Performed by: INTERNAL MEDICINE

## 2017-07-26 PROCEDURE — 99214 OFFICE O/P EST MOD 30 MIN: CPT | Performed by: NURSE PRACTITIONER

## 2017-07-26 RX ORDER — MONTELUKAST SODIUM 10 MG/1
10 TABLET ORAL EVERY EVENING
Qty: 90 TAB | Refills: 3 | Status: SHIPPED | OUTPATIENT
Start: 2017-07-26 | End: 2017-09-02 | Stop reason: SDUPTHER

## 2017-07-26 RX ORDER — METFORMIN HYDROCHLORIDE 500 MG/1
TABLET, EXTENDED RELEASE ORAL
COMMUNITY
Start: 2017-06-15 | End: 2018-05-08 | Stop reason: SDUPTHER

## 2017-07-26 ASSESSMENT — PULMONARY FUNCTION TESTS
FEV1_PERCENT_PREDICTED: 103
FEV1/FVC_PERCENT_PREDICTED: 111
FVC_PERCENT_PREDICTED: 93
FEV1/FVC_PERCENT_PREDICTED: 111
FEV1_PERCENT_PREDICTED: 112
FVC: 3.24
FEV1_PERCENT_CHANGE: 8
FEV1: 2.49
FEV1/FVC: 84
FVC_PREDICTED: 3.18
FEV1_PREDICTED: 2.4
FEV1/FVC_PERCENT_CHANGE: 89
FEV1/FVC: 83.33
FVC: 2.97
FEV1/FVC_PERCENT_PREDICTED: 75
FEV1: 2.7
FEV1_PERCENT_CHANGE: 9
FVC_PERCENT_PREDICTED: 101

## 2017-07-26 NOTE — MR AVS SNAPSHOT
Judy Bland   2017 2:00 PM   Non-Provider Visit   MRN: [id number]    Department:  [facility]   Dept Phone:  [phone]    Description:  Female : 1945   Provider:  Rachel Helm M.D.           Reason for Visit     Shortness of Breath           Allergies as of 2017     Allergen Noted Reactions    Ultram [Tramadol Hcl] 2011       ELEVATED LIVER ENZYMES      You were diagnosed with     Shortness of breath   [786.05.ICD-9-CM]         Vital Signs     Smoking Status                   Never Smoker            Basic Information     Date Of Birth Sex Race Ethnicity Preferred Language    1945 Female White Non- English      Your appointments     2017  3:20 PM   Established Patient Pul with MARKOS Kaplan   [facility] (--)    [address]  [address]  [facility] [id number] [phone]              Problem List              ICD-10-CM Priority Class Noted - Resolved    Acquired polycythemia (Chronic) D75.1   2/15/2012 - Present    Benign essential hypertension (Chronic) I10   2/15/2012 - Present    Difficulty breathing R06.89   2/15/2012 - Present    Cough (Chronic) R05   2/15/2012 - Present    Hypercholesterolemia (Chronic) E78.00   2/15/2012 - Present    Murmur (Chronic) R01.1   2/15/2012 - Present    Obesity (Chronic) E66.9   2/15/2012 - Present    BACLIIO (obstructive sleep apnea) (Chronic) G47.33   2/15/2012 - Present    Sinus tachycardia (Chronic) R00.0   2/15/2012 - Present    Arthropathy (Chronic) M12.9   2/15/2012 - Present    Chronic hepatitis B (CMS-HCC) (Chronic) B18.1   2/15/2012 - Present    Esophageal reflux (Chronic) K21.9   2/15/2012 - Present    Hyperlipidemia (Chronic) E78.5   2/15/2012 - Present    Shortness of breath R06.02   2015 - Present    Allergic rhinitis due to allergen J30.9   2016 - Present    LLOYD (dyspnea on exertion) R06.09   3/16/2017 - Present      Health Maintenance        Date Due  Completion Dates    IMM DTaP/Tdap/Td Vaccine (1 - Tdap) 8/20/1964 ---    PAP SMEAR 8/20/1966 ---    COLONOSCOPY 8/20/1995 ---    IMM ZOSTER VACCINE 8/20/2005 ---    IMM PNEUMOCOCCAL 65+ (ADULT) LOW/MEDIUM RISK SERIES (1 of 2 - PCV13) 8/20/2010 ---    IMM INFLUENZA (1) 9/1/2017 ---    MAMMOGRAM 3/24/2018 3/24/2017, 3/4/2016, 2/25/2015, 2/20/2014, 2/7/2013, 2/3/2012, 1/27/2011, 1/22/2010, 1/22/2010, 1/21/2009, 1/21/2009, 1/17/2008, 1/17/2008, 1/15/2007, 1/11/2006, 1/10/2005    BONE DENSITY 12/29/2019 12/29/2014, 4/7/2009            Current Immunizations     13-VALENT PCV PREVNAR 1/26/2017    Influenza TIV (IM) 12/12/2016      Below and/or attached are the medications your provider expects you to take. Review all of your home medications and newly ordered medications with your provider and/or pharmacist. Follow medication instructions as directed by your provider and/or pharmacist. Please keep your medication list with you and share with your provider. Update the information when medications are discontinued, doses are changed, or new medications (including over-the-counter products) are added; and carry medication information at all times in the event of emergency situations     Allergies:  ULTRAM - (reactions not documented)               Medications  Valid as of: July 26, 2017 -  2:31 PM    Generic Name Brand Name Tablet Size Instructions for use    Acetaminophen   Take  by mouth. EXTRA STRENGH 2 TABS PRN         Albuterol Sulfate   Inhale  by mouth as needed.        ALPRAZolam (Tab) XANAX 0.5 MG Take 0.5 mg by mouth 1 time daily as needed.        Atorvastatin Calcium (Tab) LIPITOR 20 MG         Budesonide-Formoterol Fumarate (Aerosol) SYMBICORT 160-4.5 MCG/ACT USE 2 INHALATIONS ORALLY   TWICE DAILY WITH SPACER,   RINSE MOUTH AFTER EACH USE        Cetirizine HCl   Take  by mouth.        DilTIAZem HCl (Tab) CARDIZEM 60 MG TAKE ONE TABLET BY MOUTH EVERY 12 HOURS        DilTIAZem HCl (Tab) CARDIZEM 60 MG TAKE 1 TABLET  EVERY 12     HOURS        Doxepin HCl (Tab) Doxepin HCl 3 MG Take 3 Tabs by mouth every bedtime.        Doxepin HCl (Cap) SINEQUAN 25 MG         Fluticasone Propionate (Suspension) FLONASE 50 MCG/ACT Spray 1 Spray in nose 2 times a day. Each Nostril         HydrOXYzine HCl (Tab) ATARAX 10 MG         Lisinopril-Hydrochlorothiazide (Tab) PRINZIDE, ZESTORETIC 20-12.5 MG         MetFORMIN HCl (Tab) GLUCOPHAGE 500 MG Take 500 mg by mouth every day.        Montelukast Sodium (Tab) SINGULAIR 10 MG Take 10 mg by mouth every day.          Montelukast Sodium (Tab) SINGULAIR 10 MG Take 1 Tab by mouth every evening.        Pantoprazole Sodium (Tablet Delayed Response) PROTONIX 40 MG Take 40 mg by mouth 2 times a day.          RaNITidine HCl (Tab) ZANTAC 150 MG Take 150 mg by mouth every day.        Simvastatin (Tab) ZOCOR 20 MG Take 1 Tab by mouth every day.        Spironolactone-HCTZ (Tab) ALDACTAZIDE 25-25 MG TAKE ONE TABLET BY MOUTH EVERY DAY        .                 Medicines prescribed today were sent to:     [facility] #[id number] - [place], [place] - [address] [place] [address] AT [address] [address]    Phone: [phone] Fax: [phone]    Open 24 Hours?: No    [facility] - [place], [place] - [address] AT [address]AL TO [place] [address] [address] [facility] [address]    Phone: [phone] Fax: [phone]    Open 24 Hours?: No      Medication refill instructions:       If your prescription bottle indicates you have medication refills left, it is not necessary to call your provider’s office. Please contact your pharmacy and they will refill your medication.    If your prescription bottle indicates you do not have any refills left, you may request refills at any time through one of the following ways: The online 2houses system (except Urgent Care), by calling your provider’s office, or by asking your pharmacy to contact your provider’s office with a refill request.  Medication refills are processed only during regular business hours and may not be available until the next business day. Your provider may request additional information or to have a follow-up visit with you prior to refilling your medication.   *Please Note: Medication refills are assigned a new Rx number when refilled electronically. Your pharmacy may indicate that no refills were authorized even though a new prescription for the same medication is available at the pharmacy. Please request the medicine by name with the pharmacy before contacting your provider for a refill.        Other Notes About Your Plan     [facility]. [phone] Fax. [phone]             Mobile Ads Access Code: [email or web address]  Expires: 8/25/2017  2:31 PM    Mobile Ads  A secure, online tool to manage your health information     [employer]’s Mobile Ads® is a secure, online tool that connects you to your personalized health information from the privacy of your home -- day or night - making it very easy for you to manage your healthcare. Once the activation process is completed, you can even access your medical information using the Mobile Ads sebastian, which is available for free in the Apple Sebastian store or Google Play store.     Mobile Ads provides the following levels of access (as shown below):   My Chart Features   [facility] Doctor [facility]  Specialists [facility]  Urgent  Care Non-[facility]  Primary Care  Doctor   Email your healthcare team securely and privately 24/7 X X X    Manage appointments: schedule your next appointment; view details of past/upcoming appointments X      Request prescription refills. X      View recent personal medical records, including lab and immunizations X X X X   View health record, including health history, allergies, medications X X X X   Read reports about your outpatient visits, procedures, consult and ER notes X X X X   See your discharge summary, which is a recap of your hospital and/or ER visit  that includes your diagnosis, lab results, and care plan. X X       How to register for Fantoo:  1. Go to  [email or web address].  2. Click on the Sign Up Now box, which takes you to the New Member Sign Up page. You will need to provide the following information:  a. Enter your Fantoo Access Code exactly as it appears at the top of this page. (You will not need to use this code after you’ve completed the sign-up process. If you do not sign up before the expiration date, you must request a new code.)   b. Enter your date of birth.   c. Enter your home email address.   d. Click Submit, and follow the next screen’s instructions.  3. Create a VeriCentert ID. This will be your VeriCentert login ID and cannot be changed, so think of one that is secure and easy to remember.  4. Create a VeriCentert password. You can change your password at any time.  5. Enter your Password Reset Question and Answer. This can be used at a later time if you forget your password.   6. Enter your e-mail address. This allows you to receive e-mail notifications when new information is available in Fantoo.  7. Click Sign Up. You can now view your health information.    For assistance activating your Fantoo account, call [phone]

## 2017-07-26 NOTE — PROCEDURES
Good patient effort & cooperation.  The results of this test meet the ATS/ERS standards for acceptability and repeatability.  A bronchodilator of Ventolin HFA- 2puffs via spacer were administered.  The DLCO was uncorrected for Hgb.        INTERPRETATION: Lung function testing completed July 26, 2017 revealed normal spirometry. No change after bronchodilators. Lung volumes do not demonstrate significant restriction or hyperinflation. Low expiratory reserve volume is consistent with the patient's elevated BMI. Oxygen transfer was normal. Good effort noted.

## 2017-07-26 NOTE — PROGRESS NOTES
Chief Complaint   Patient presents with   • Follow-Up       HPI:  Judy Bland is a 71 y.o. year old female here today for follow-up on BACILIO and asthma. She is followed by Dr. Quiroga for allergies, Cardio Dr. Rasheed for HTN, tachycardia, murmur and dyslipidemia.    Last compliance card download indicated -16 100% compliance, avg nightly use of 6hr 17min, minimal mask leaking and AHI 4.6. She continues to use machine nightly. She is on CPAP 7cm H20. She recently received a new machine. She uses a nasal mask and tolerates mask/pressure well. She remains well rested and denies daytime sleepiness. She denies morning headaches or dizziness. She is in need of new order for mask/supplies.    She is compliant on Symbicort 160/4.5mcg 2 puffs BID, Singulair QD, Flonase prn and Ventolin HFA inhaler prn. She has a history of positive skin allergy testing that she received allergy shots for. PFT 14 indicated FVC 2.05L or 62%, FEV1 1.71L or 68%, FEV1/FVC ratio 84 and DLCO 120% predicted. Repeat PFT indicates normal lung function on current therapy with FEV1 2.49 L or 103% predicted, FEV1/FVC ratio 84, no hyperinflation, TLC 94% predicted, DLCO 124% predicted. Patient did have mild bronchodilator response. I reviewed testing with patient.    She notes dyspnea on exertion to be stable but feels Symbicort is not as effective as it used to be although her PFTs are technically normal on therapy. She like to try another inhaler. Her main complaint is becoming short of breath going up stairs. She is also overweight and deconditioned since her dog . They are not walking routinely. I reviewed that this most likely contributes to her increased dyspnea on stairs. She denies fever, chills, night sweats, chest pain, insulin hemoptysis. She does have an occasional dry a.m. cough. She continues taking Singulair and Zyrtec on a daily basis. GERD is controlled with ranitidine and pantoprazole daily. She rarely  needs rescue inhaler. She used user prior to walking the dog.    ROS: As per HPI and otherwise negative if not stated.    Past Medical History   Diagnosis Date   • Arthritis    • Heart burn    • Hepatitis B    • Hypertension    • Acquired polycythemia 2/15/2012   • Benign essential hypertension 2/15/2012   • Difficulty breathing 2/15/2012   • Cough 2/15/2012   • Hypercholesterolemia 2/15/2012   • Murmur 2/15/2012   • Obesity 2/15/2012   • Sinus tachycardia 2/15/2012   • Arthropathy 2/15/2012   • Chronic hepatitis B (CMS-HCC) 2/15/2012   • Esophageal reflux 2/15/2012   • Hyperlipidemia 2/15/2012   • Jaundice    • Indigestion    • Anesthesia      PONV   • BACILIO (obstructive sleep apnea) 2/15/2012       Past Surgical History   Procedure Laterality Date   • Cataract extraction with iol  2008     OU   • Rotator cuff repair  2005     left   • Cervical conization       Loop Electrode Excision   • Primary c section     • Knee arthroplasty total  4/8/08     Performed by ROHAN BLACKWELL at SURGERY [facility] ORS   • Knee arthroplasty total  4/8/08     Performed by ROHAN BLACKWELL at SURGERY [facility] ORS   • Carpal tunnel release  2009     b/L   • Trigger finger release  2005     left   • Trigger finger release  11/23/2009     Performed by MEHDI ONEIL at SURGERY [facility] ORS   • Hand surgery     • Septoplasty  3/12/2012     Performed by MEHDI SWAIN at SURGERY SAME DAY [facility] ORS   • Turbinoplasty  3/12/2012     Performed by MEHDI SWAIN at SURGERY SAME DAY [facility] ORS   • Sinusotomies  3/12/2012     Performed by MEHDI SWAIN at SURGERY SAME DAY [facility] ORS       Family History   Problem Relation Age of Onset   • Diabetes     • Heart Disease     • Hypertension     • Stroke     • Other Mother      Family hx of Coronary Arteriosclerosis   • Other Father      Family hx of Coronary Arteriosclerosis       Social History     Social History   • Marital Status:      Spouse Name: N/A   • Number of Children:  "N/A   • Years of Education: N/A     Occupational History   • Not on file.     Social History Main Topics   • Smoking status: Never Smoker    • Smokeless tobacco: Never Used   • Alcohol Use: 0.6 oz/week     0 Standard drinks or equivalent, 1 Glasses of wine per week      Comment: rarely    • Drug Use: No   • Sexual Activity: Not on file     Other Topics Concern   • Not on file     Social History Narrative       Allergies as of 07/26/2017 - Mateo as Reviewed 07/26/2017   Allergen Reaction Noted   • Ultram [tramadol hcl]  02/09/2011        @Vital signs for this encounter:  Filed Vitals:    07/26/17 1430   Height: 1.676 m (5' 5.98\")   Weight: 96.163 kg (212 lb)   Weight % change since last entry.: 0 %   BP: 118/78   Pulse: 92   BMI (Calculated): 34.23   Resp: 16   Temp: 36.9 °C (98.4 °F)   O2 sat % room air: 95 %       Current medications as of today   Current Outpatient Prescriptions   Medication Sig Dispense Refill   • budesonide-formoterol (SYMBICORT) 160-4.5 MCG/ACT Aerosol USE 2 INHALATIONS ORALLY   TWICE DAILY WITH SPACER,   RINSE MOUTH AFTER EACH USE 3 Inhaler 3   • doxepin (SINEQUAN) 25 MG Cap      • montelukast (SINGULAIR) 10 MG Tab Take 1 Tab by mouth every evening. 90 Tab 3   • metformin (GLUCOPHAGE) 500 MG Tab Take 500 mg by mouth every day.     • diltiazem (CARDIZEM) 60 MG Tab TAKE ONE TABLET BY MOUTH EVERY 12 HOURS 180 Tab 3   • atorvastatin (LIPITOR) 20 MG Tab      • lisinopril-hydrochlorothiazide (PRINZIDE, ZESTORETIC) 20-12.5 MG per tablet      • ranitidine (ZANTAC) 150 MG TABS Take 150 mg by mouth every day.     • Cetirizine HCl (ZYRTEC PO) Take  by mouth.     • spironolactone/hctz (ALDACTAZIDE) 25-25 MG TABS TAKE ONE TABLET BY MOUTH EVERY DAY 90 Tab 1   • simvastatin (ZOCOR) 20 MG TABS Take 1 Tab by mouth every day. 30 Each 11   • Albuterol Sulfate (VENTOLIN HFA INH) Inhale  by mouth as needed.     • metformin ER (GLUCOPHAGE XR) 500 MG TABLET SR 24 HR      • diltiazem (CARDIZEM) 60 MG Tab TAKE 1 TABLET " EVERY 12     HOURS 180 Tab 3   • Doxepin HCl 3 MG Tab Take 3 Tabs by mouth every bedtime.     • hydrOXYzine (ATARAX) 10 MG Tab      • pantoprazole (PROTONIX) 40 MG TBEC Take 40 mg by mouth 2 times a day.       • montelukast (SINGULAIR) 10 MG TABS Take 10 mg by mouth every day.       • Acetaminophen (TYLENOL PO) Take  by mouth. EXTRA STRENGH 2 TABS PRN      • alprazolam (XANAX) 0.5 MG TABS Take 0.5 mg by mouth 1 time daily as needed.     • fluticasone (FLONASE) 50 MCG/ACT nasal spray Spray 1 Spray in nose 2 times a day. Each Nostril        No current facility-administered medications for this visit.         Physical Exam:   Gen:           Alert and oriented, No apparent distress. Mood and affect appropriate, normal interaction with examiner.  Eyes:          PERRL, EOM intact, sclere white, conjunctive moist.  Ears:          Not examined.   Hearing:     Grossly intact.  Nose:          Normal, no lesions or deformities.  Dentition:    Good dentition.  Oropharynx:   Tongue normal, posterior pharynx without erythema or exudate.  Mallampati Classification: 3  Neck:        Supple, trachea midline, no masses.  Respiratory Effort: No intercostal retractions or use of accessory muscles.   Lung Auscultation:      Clear to auscultation bilaterally; no rales, rhonchi or wheezing.  CV:            Regular rate and rhythm. No murmurs, rubs or gallops.  Abd:           Not examined. Overweight.  Lymphadenopathy: Not examined.  Gait and Station: Normal.  Digits and Nails: No clubbing, cyanosis, petechiae, or nodes.   Cranial Nerves: II-XII grossly intact.  Skin:        No rashes, lesions or ulcers noted.               Ext:           No cyanosis or edema.      Assessment:  1. BACILIO (obstructive sleep apnea)     2. Mild persistent asthma without complication     3. Allergic rhinitis, unspecified allergic rhinitis trigger, unspecified rhinitis seasonality     4. Obesity (BMI 30-39.9)  HEIGHT AND WEIGHT        Immunizations:    Flu:2016  Pneumovax 23:update next year  Prevnar 13:1/2016    Plan:  1. Trial of Breo 200mcg 1 puff QD. RX and sample RX given. Continue JANETT prn. IF Breo not effective, return to Symbicort 160/4.5mcg 2puffs BID with spacer, rinse mouth.  2. Continue zyrtec/singluair qhs.  3. DME order mask/supplies. Continue cpap nightly.  4. DME AHI download now.  5. Discussed respiratory and sleep hygiene.  6. Encouraged weight loss with dietary changes and walking.  7. Follow up in 1 year with compliance card, sooner if needed.        ADDENDUM: Compliance card 4/27/17-7/25/17 indicates 47.8% compliance in last 90 days, avg nightly use of 7hr 17min, AHi 4.3 and 12min of nightly mask leaking. No change in therapy. Patient has had consistent use since 6/15/17.

## 2017-09-02 DIAGNOSIS — R06.02 SHORTNESS OF BREATH: ICD-10-CM

## 2017-09-05 RX ORDER — MONTELUKAST SODIUM 10 MG/1
TABLET ORAL
Qty: 90 TAB | Refills: 3 | Status: SHIPPED | OUTPATIENT
Start: 2017-09-05 | End: 2018-09-27 | Stop reason: SDUPTHER

## 2017-09-05 NOTE — TELEPHONE ENCOUNTER
Have we ever prescribed this med? Yes.  If yes, what date? 07/26/2017    Last OV: 07/26/2017 - Deidra Sr    Next OV: none scheduled; not due till July 2018    DX: Asthma    Medications: Singulair

## 2018-02-20 ENCOUNTER — APPOINTMENT (RX ONLY)
Dept: URBAN - METROPOLITAN AREA CLINIC 4 | Facility: CLINIC | Age: 73
Setting detail: DERMATOLOGY
End: 2018-02-20

## 2018-02-20 DIAGNOSIS — L82.1 OTHER SEBORRHEIC KERATOSIS: ICD-10-CM

## 2018-02-20 DIAGNOSIS — L82.0 INFLAMED SEBORRHEIC KERATOSIS: ICD-10-CM

## 2018-02-20 DIAGNOSIS — D22 MELANOCYTIC NEVI: ICD-10-CM

## 2018-02-20 DIAGNOSIS — L81.4 OTHER MELANIN HYPERPIGMENTATION: ICD-10-CM

## 2018-02-20 DIAGNOSIS — D18.0 HEMANGIOMA: ICD-10-CM

## 2018-02-20 PROBLEM — D18.01 HEMANGIOMA OF SKIN AND SUBCUTANEOUS TISSUE: Status: ACTIVE | Noted: 2018-02-20

## 2018-02-20 PROBLEM — D22.62 MELANOCYTIC NEVI OF LEFT UPPER LIMB, INCLUDING SHOULDER: Status: ACTIVE | Noted: 2018-02-20

## 2018-02-20 PROBLEM — K75.9 INFLAMMATORY LIVER DISEASE, UNSPECIFIED: Status: ACTIVE | Noted: 2018-02-20

## 2018-02-20 PROBLEM — D22.61 MELANOCYTIC NEVI OF RIGHT UPPER LIMB, INCLUDING SHOULDER: Status: ACTIVE | Noted: 2018-02-20

## 2018-02-20 PROCEDURE — 99213 OFFICE O/P EST LOW 20 MIN: CPT | Mod: 25

## 2018-02-20 PROCEDURE — ? COUNSELING

## 2018-02-20 PROCEDURE — ? LIQUID NITROGEN

## 2018-02-20 PROCEDURE — 17110 DESTRUCTION B9 LES UP TO 14: CPT

## 2018-02-20 ASSESSMENT — LOCATION SIMPLE DESCRIPTION DERM
LOCATION SIMPLE: RIGHT CLAVICULAR SKIN
LOCATION SIMPLE: RIGHT FOREARM
LOCATION SIMPLE: RIGHT UPPER BACK
LOCATION SIMPLE: LEFT SHOULDER
LOCATION SIMPLE: LEFT CHEEK
LOCATION SIMPLE: LEFT UPPER BACK
LOCATION SIMPLE: ABDOMEN
LOCATION SIMPLE: RIGHT ANTERIOR NECK
LOCATION SIMPLE: RIGHT ELBOW
LOCATION SIMPLE: LEFT POSTERIOR UPPER ARM
LOCATION SIMPLE: LEFT ANTERIOR NECK
LOCATION SIMPLE: LEFT FOREHEAD
LOCATION SIMPLE: UPPER BACK
LOCATION SIMPLE: RIGHT PRETIBIAL REGION
LOCATION SIMPLE: RIGHT UPPER ARM
LOCATION SIMPLE: LEFT FOREARM
LOCATION SIMPLE: LEFT HAND
LOCATION SIMPLE: LEFT KNEE
LOCATION SIMPLE: RIGHT THIGH
LOCATION SIMPLE: POSTERIOR NECK
LOCATION SIMPLE: RIGHT HAND
LOCATION SIMPLE: LEFT THIGH

## 2018-02-20 ASSESSMENT — LOCATION DETAILED DESCRIPTION DERM
LOCATION DETAILED: RIGHT RIB CAGE
LOCATION DETAILED: INFERIOR THORACIC SPINE
LOCATION DETAILED: RIGHT ELBOW
LOCATION DETAILED: LEFT INFERIOR MEDIAL FOREHEAD
LOCATION DETAILED: LEFT MID-UPPER BACK
LOCATION DETAILED: RIGHT ANTERIOR DISTAL THIGH
LOCATION DETAILED: LEFT INFERIOR LATERAL NECK
LOCATION DETAILED: RIGHT SUPERIOR MEDIAL UPPER BACK
LOCATION DETAILED: RIGHT MEDIAL TRAPEZIAL NECK
LOCATION DETAILED: LEFT ANTERIOR SHOULDER
LOCATION DETAILED: RIGHT INFERIOR UPPER BACK
LOCATION DETAILED: LEFT KNEE
LOCATION DETAILED: LEFT PROXIMAL DORSAL FOREARM
LOCATION DETAILED: RIGHT DISTAL RADIAL DORSAL FOREARM
LOCATION DETAILED: LEFT CENTRAL MALAR CHEEK
LOCATION DETAILED: LEFT LATERAL UPPER BACK
LOCATION DETAILED: RIGHT CLAVICULAR SKIN
LOCATION DETAILED: RIGHT INFERIOR LATERAL NECK
LOCATION DETAILED: RIGHT ANTERIOR PROXIMAL UPPER ARM
LOCATION DETAILED: LEFT CLAVICULAR NECK
LOCATION DETAILED: LEFT DISTAL POSTERIOR UPPER ARM
LOCATION DETAILED: RIGHT CLAVICULAR NECK
LOCATION DETAILED: LEFT SUPERIOR UPPER BACK
LOCATION DETAILED: LEFT ANTERIOR DISTAL THIGH
LOCATION DETAILED: RIGHT PROXIMAL PRETIBIAL REGION
LOCATION DETAILED: RIGHT MID-UPPER BACK
LOCATION DETAILED: LEFT RADIAL DORSAL HAND
LOCATION DETAILED: RIGHT RADIAL DORSAL HAND

## 2018-02-20 ASSESSMENT — LOCATION ZONE DERM
LOCATION ZONE: ARM
LOCATION ZONE: TRUNK
LOCATION ZONE: HAND
LOCATION ZONE: FACE
LOCATION ZONE: NECK
LOCATION ZONE: LEG

## 2018-02-20 NOTE — PROCEDURE: LIQUID NITROGEN
Consent: The patient's consent was obtained including but not limited to risks of crusting, scabbing, blistering, scarring, darker or lighter pigmentary change, recurrence, incomplete removal and infection.
Render Post-Care Instructions In Note?: no
Post-Care Instructions: I reviewed with the patient in detail post-care instructions. Patient is to wear sunprotection, and avoid picking at any of the treated lesions. Pt may apply Vaseline to crusted or scabbing areas.
Medical Necessity Clause: This procedure was medically necessary because the lesions that were treated were:
Medical Necessity Information: It is in your best interest to select a reason for this procedure from the list below. All of these items fulfill various CMS LCD requirements except the new and changing color options.
Detail Level: Detailed

## 2018-03-23 ENCOUNTER — OFFICE VISIT (OUTPATIENT)
Dept: CARDIOLOGY | Facility: MEDICAL CENTER | Age: 73
End: 2018-03-23
Payer: MEDICARE

## 2018-03-23 VITALS
HEIGHT: 66 IN | RESPIRATION RATE: 14 BRPM | WEIGHT: 211 LBS | HEART RATE: 106 BPM | OXYGEN SATURATION: 93 % | DIASTOLIC BLOOD PRESSURE: 80 MMHG | BODY MASS INDEX: 33.91 KG/M2 | SYSTOLIC BLOOD PRESSURE: 130 MMHG

## 2018-03-23 DIAGNOSIS — E78.00 HYPERCHOLESTEROLEMIA: Chronic | ICD-10-CM

## 2018-03-23 DIAGNOSIS — R00.0 SINUS TACHYCARDIA: Chronic | ICD-10-CM

## 2018-03-23 DIAGNOSIS — R06.09 DOE (DYSPNEA ON EXERTION): ICD-10-CM

## 2018-03-23 DIAGNOSIS — R79.89 ELEVATED LFTS: ICD-10-CM

## 2018-03-23 DIAGNOSIS — I10 BENIGN ESSENTIAL HYPERTENSION: Chronic | ICD-10-CM

## 2018-03-23 PROCEDURE — 99214 OFFICE O/P EST MOD 30 MIN: CPT | Performed by: INTERNAL MEDICINE

## 2018-03-23 RX ORDER — CYCLOSPORINE 0.5 MG/ML
1 EMULSION OPHTHALMIC 2 TIMES DAILY
COMMUNITY
Start: 2017-12-27

## 2018-03-23 ASSESSMENT — ENCOUNTER SYMPTOMS
ORTHOPNEA: 0
LOSS OF CONSCIOUSNESS: 0
PND: 0
SHORTNESS OF BREATH: 1
MYALGIAS: 0
DIZZINESS: 0
PALPITATIONS: 0

## 2018-03-23 NOTE — PROGRESS NOTES
Chief Complaint   Patient presents with   • HTN (Controlled)       Subjective:   Judy Bland is a 72 y.o. female who presents today hypertension, tachycardia with a history of asthma, obstructive sleep apnea and exertional shortness of breath, hyperlipidemia.    Last seen on 3/16/2017.    Since 3/16/2017 appointment the patient has had no new cardiac symptoms.  Continues to have exertional shortness of breath.  Her  has developed Alzheimer's disease and his short-term memory is worse.  Walks at the [employer] which is a 2 mile course now having to stop 3 times.  Previous normal pulmonary function test.    Past medical history  No specific cardiac symptoms though does have some exertional shortness of breath which generally has been chronic.  Has asthma uses daily inhalers.  No specific angina.  Tries to walk some every day  No PND orthopnea or lower extremity edema.  Compliant with her medications.    In December, 2014, saw her pulmonologist.  PFTs showed fracture of being overweight but no obstructive disease.  Takes medicines for asthma diagnosed 3 years ago.  A lot of problems with Kenalog causing eczema than had to be on long-term prednisone.    No history of CAD or angina.  Previous echocardiograms had shown left ventricular hypertrophy but no valve disease. Right heart pressures could not be evaluated.  Had a remote stress echocardiogram that was apparently unremarkable.    Past Medical History:   Diagnosis Date   • Acquired polycythemia 2/15/2012   • Anesthesia     PONV   • Arthritis    • Arthropathy 2/15/2012   • Benign essential hypertension 2/15/2012   • Chronic hepatitis B (CMS-HCC) 2/15/2012   • Cough 2/15/2012   • Difficulty breathing 2/15/2012   • Esophageal reflux 2/15/2012   • Heart burn    • Hepatitis B    • Hypercholesterolemia 2/15/2012   • Hyperlipidemia 2/15/2012   • Hypertension    • Indigestion    • Jaundice    • Murmur 2/15/2012   • Obesity 2/15/2012   • BACILIO (obstructive sleep  apnea) 2/15/2012   • Sinus tachycardia 2/15/2012     Past Surgical History:   Procedure Laterality Date   • SEPTOPLASTY  3/12/2012    Performed by MEHDI SWAIN at SURGERY SAME DAY [facility] ORS   • TURBINOPLASTY  3/12/2012    Performed by MEHDI SWAIN at SURGERY SAME DAY [facility] ORS   • SINUSOTOMIES  3/12/2012    Performed by MEHDI SWAIN at SURGERY SAME DAY [facility] ORS   • TRIGGER FINGER RELEASE  11/23/2009    Performed by MEHDI ONEIL at SURGERY [facility] ORS   • CARPAL TUNNEL RELEASE  2009    b/L   • KNEE ARTHROPLASTY TOTAL  4/8/08    Performed by ROHAN BLACKWELL at SURGERY [facility] ORS   • KNEE ARTHROPLASTY TOTAL  4/8/08    Performed by ROHAN BLACKWELL at SURGERY [facility] ORS   • CATARACT EXTRACTION WITH IOL  2008    OU   • ROTATOR CUFF REPAIR  2005    left   • TRIGGER FINGER RELEASE  2005    left   • CERVICAL CONIZATION      Loop Electrode Excision   • HAND SURGERY     • PRIMARY C SECTION       Family History   Problem Relation Age of Onset   • Diabetes     • Heart Disease     • Hypertension     • Stroke     • Other Mother      Family hx of Coronary Arteriosclerosis   • Other Father      Family hx of Coronary Arteriosclerosis     Social History     Social History   • Marital status:      Spouse name: N/A   • Number of children: N/A   • Years of education: N/A     Occupational History   • Not on file.     Social History Main Topics   • Smoking status: Never Smoker   • Smokeless tobacco: Never Used   • Alcohol use 0.6 oz/week     1 Glasses of wine per week      Comment: rarely    • Drug use: No   • Sexual activity: Not on file     Other Topics Concern   • Not on file     Social History Narrative   • No narrative on file     Allergies   Allergen Reactions   • Ultram [Tramadol Hcl]      ELEVATED LIVER ENZYMES     Outpatient Encounter Prescriptions as of 3/23/2018   Medication Sig Dispense Refill   • RESTASIS 0.05 % ophthalmic emulsion Place 1 Drop in both eyes 2 times a day.     •  montelukast (SINGULAIR) 10 MG Tab TAKE 1 TABLET EVERY EVENING 90 Tab 3   • budesonide-formoterol (SYMBICORT) 160-4.5 MCG/ACT Aerosol USE 2 INHALATIONS ORALLY   TWICE DAILY WITH SPACER,   RINSE MOUTH AFTER EACH USE 3 Inhaler 3   • doxepin (SINEQUAN) 25 MG Cap Take 25 mg by mouth every day.     • metformin (GLUCOPHAGE) 500 MG Tab Take 500 mg by mouth every bedtime.     • diltiazem (CARDIZEM) 60 MG Tab TAKE ONE TABLET BY MOUTH EVERY 12 HOURS 180 Tab 3   • atorvastatin (LIPITOR) 20 MG Tab Take 20 mg by mouth every bedtime.     • lisinopril-hydrochlorothiazide (PRINZIDE, ZESTORETIC) 20-12.5 MG per tablet Take 1 Tab by mouth every morning.     • ranitidine (ZANTAC) 150 MG TABS Take 150 mg by mouth every day.     • Cetirizine HCl (ZYRTEC PO) Take 1 Each by mouth every day.     • pantoprazole (PROTONIX) 40 MG TBEC Take 40 mg by mouth 2 times a day.       • Acetaminophen (TYLENOL PO) Take  by mouth. EXTRA STRENGH 2 TABS PRN      • Albuterol Sulfate (VENTOLIN HFA INH) Inhale 1 Puff by mouth as needed.     • alprazolam (XANAX) 0.5 MG TABS Take 0.5 mg by mouth 1 time daily as needed.     • fluticasone (FLONASE) 50 MCG/ACT nasal spray Spray 1 Spray in nose 2 times a day. Each Nostril      • metformin ER (GLUCOPHAGE XR) 500 MG TABLET SR 24 HR      • Fluticasone Furoate-Vilanterol (BREO ELLIPTA) 200-25 MCG/INH AEROSOL POWDER, BREATH ACTIVATED Take 1 Inhalation by mouth every day. Rinse mouth after use. (Patient not taking: Reported on 3/23/2018) 1 Each 5   • Fluticasone Furoate-Vilanterol (BREO ELLIPTA) 200-25 MCG/INH AEROSOL POWDER, BREATH ACTIVATED Take 1 Inhalation by mouth every day. Rinse mouth after use. (Patient not taking: Reported on 3/23/2018) 2 Each 0   • [DISCONTINUED] diltiazem (CARDIZEM) 60 MG Tab TAKE 1 TABLET EVERY 12     HOURS (Patient not taking: Reported on 3/23/2018) 180 Tab 3   • [DISCONTINUED] Doxepin HCl 3 MG Tab Take 3 Tabs by mouth every bedtime.     • hydrOXYzine (ATARAX) 10 MG Tab      •  "spironolactone/hctz (ALDACTAZIDE) 25-25 MG TABS TAKE ONE TABLET BY MOUTH EVERY DAY (Patient not taking: Reported on 3/23/2018) 90 Tab 1   • montelukast (SINGULAIR) 10 MG TABS Take 10 mg by mouth every day.       • [DISCONTINUED] simvastatin (ZOCOR) 20 MG TABS Take 1 Tab by mouth every day. (Patient not taking: Reported on 3/23/2018) 30 Each 11     No facility-administered encounter medications on file as of 3/23/2018.      Review of Systems   Respiratory: Positive for shortness of breath.    Cardiovascular: Negative for chest pain, palpitations, orthopnea, leg swelling and PND.   Musculoskeletal: Negative for myalgias.   Neurological: Negative for dizziness and loss of consciousness.        Objective:   /80   Pulse (!) 106   Resp 14   Ht 1.676 m (5' 6\")   Wt 95.7 kg (211 lb)   SpO2 93%   BMI 34.06 kg/m²     Physical Exam   Constitutional: She is oriented to person, place, and time. No distress.   Neck: No JVD present.   Cardiovascular: Normal rate and regular rhythm.    No murmur heard.  Pulses:       Carotid pulses are 1+ on the right side, and 1+ on the left side.       Radial pulses are 1+ on the right side, and 1+ on the left side.        Posterior tibial pulses are 1+ on the right side, and 1+ on the left side.   Pulmonary/Chest: Effort normal. She has no wheezes. She has no rales.   Increased AP diameter   Abdominal:   Protuberant.   Musculoskeletal: She exhibits no edema.   Neurological: She is alert and oriented to person, place, and time.     03/4/2011 echocardiogram:  EF greater than 65%. Mild to moderate left hypertrophy. Pulmonary pressures cannot be assessed.    03/06/2015 ECHOCARDIOGRAM  Normal left ventricular systolic function.  Normal regional wall motion with vigorous global contractility.  Left ventricular ejection fraction is 65% to 70%.  Normal right ventricular systolic function.  Aortic sclerosis without stenosis.  Right ventricular systolic pressure is estimated to be 24-29 " mmHg.  Anterior echo free space with some enhanced echogenicity of the   posterior pericardium; uncertain significance.     01/18/2016 Cholesterol 159. Triglycerides 155. HDL 61. LDL 67.    03/16/2017 EKG: Normal sinus rhythm, rate 99. Isolated PVC.    Assessment:     1. LLOYD (dyspnea on exertion)  TREADMILL STRESS   2. Benign essential hypertension     3. Sinus tachycardia     4. Hypercholesterolemia     5. Elevated LFTs  COMP METABOLIC PANEL     Medical Decision Making:  Today's Assessment / Status / Plan:     Shortness of breath with exertion. Probably multifactorial predominantly pulmonary. Rule out ischemia.    Hypertension. Controlled.    Hyperlipidemia. On atorvastatin.    Elevated liver function tests.    Asthma.    Sleep apnea on CPAP.    Recommendations  1. I reviewed her recent blood work which shows mildly elevated LFTs. Normal lipid panel. Glycohemoglobin 6.1%.  2. Proceed with an exercise treadmill stress test. Exercise oximetry.  3. Follow-up LFTs in one month.  4. The patient's PCP Dr. Lilian Yarbrough closer office last week and she will be seeking a new PCP.

## 2018-03-30 ENCOUNTER — NON-PROVIDER VISIT (OUTPATIENT)
Dept: CARDIOLOGY | Facility: MEDICAL CENTER | Age: 73
End: 2018-03-30
Attending: INTERNAL MEDICINE
Payer: MEDICARE

## 2018-03-30 VITALS
DIASTOLIC BLOOD PRESSURE: 74 MMHG | HEIGHT: 66 IN | BODY MASS INDEX: 32.95 KG/M2 | SYSTOLIC BLOOD PRESSURE: 118 MMHG | OXYGEN SATURATION: 94 % | WEIGHT: 205 LBS | HEART RATE: 94 BPM

## 2018-03-30 DIAGNOSIS — R06.09 DOE (DYSPNEA ON EXERTION): ICD-10-CM

## 2018-03-30 DIAGNOSIS — R06.02 SHORTNESS OF BREATH: ICD-10-CM

## 2018-03-30 LAB — TREADMILL STRESS: NORMAL

## 2018-03-30 PROCEDURE — 93015 CV STRESS TEST SUPVJ I&R: CPT | Performed by: INTERNAL MEDICINE

## 2018-04-02 ENCOUNTER — TELEPHONE (OUTPATIENT)
Dept: CARDIOLOGY | Facility: MEDICAL CENTER | Age: 73
End: 2018-04-02

## 2018-04-02 NOTE — TELEPHONE ENCOUNTER
"Pt notified regarding normal treadmill results. Offered pt referral to [facility] for weight loss management however pt declined for now and stated she would \"think about it\".   "

## 2018-04-24 LAB
ALBUMIN SERPL-MCNC: 4.4 G/DL (ref 3.5–4.8)
ALBUMIN/GLOB SERPL: 1.8 {RATIO} (ref 1.2–2.2)
ALP SERPL-CCNC: 95 IU/L (ref 39–117)
ALT SERPL-CCNC: 47 IU/L (ref 0–32)
AST SERPL-CCNC: 33 IU/L (ref 0–40)
BILIRUB SERPL-MCNC: 0.4 MG/DL (ref 0–1.2)
BUN SERPL-MCNC: 22 MG/DL (ref 8–27)
BUN/CREAT SERPL: 27 (ref 12–28)
CALCIUM SERPL-MCNC: 9.6 MG/DL (ref 8.7–10.3)
CHLORIDE SERPL-SCNC: 101 MMOL/L (ref 96–106)
CO2 SERPL-SCNC: 22 MMOL/L (ref 18–29)
CREAT SERPL-MCNC: 0.83 MG/DL (ref 0.57–1)
GFR SERPLBLD CREATININE-BSD FMLA CKD-EPI: 71 ML/MIN/1.73
GFR SERPLBLD CREATININE-BSD FMLA CKD-EPI: 81 ML/MIN/1.73
GLOBULIN SER CALC-MCNC: 2.4 G/DL (ref 1.5–4.5)
GLUCOSE SERPL-MCNC: 123 MG/DL (ref 65–99)
POTASSIUM SERPL-SCNC: 4.2 MMOL/L (ref 3.5–5.2)
PROT SERPL-MCNC: 6.8 G/DL (ref 6–8.5)
SODIUM SERPL-SCNC: 141 MMOL/L (ref 134–144)

## 2018-05-08 ENCOUNTER — OFFICE VISIT (OUTPATIENT)
Dept: MEDICAL GROUP | Facility: MEDICAL CENTER | Age: 73
End: 2018-05-08
Payer: MEDICARE

## 2018-05-08 VITALS
SYSTOLIC BLOOD PRESSURE: 124 MMHG | HEART RATE: 102 BPM | DIASTOLIC BLOOD PRESSURE: 62 MMHG | HEIGHT: 66 IN | OXYGEN SATURATION: 90 % | WEIGHT: 213 LBS | BODY MASS INDEX: 34.23 KG/M2 | RESPIRATION RATE: 16 BRPM | TEMPERATURE: 98.7 F

## 2018-05-08 DIAGNOSIS — E78.5 DYSLIPIDEMIA: Chronic | ICD-10-CM

## 2018-05-08 DIAGNOSIS — G47.33 OSA (OBSTRUCTIVE SLEEP APNEA): Chronic | ICD-10-CM

## 2018-05-08 DIAGNOSIS — E11.9 TYPE 2 DIABETES MELLITUS WITHOUT COMPLICATION, WITHOUT LONG-TERM CURRENT USE OF INSULIN (HCC): ICD-10-CM

## 2018-05-08 DIAGNOSIS — K21.9 GASTROESOPHAGEAL REFLUX DISEASE, ESOPHAGITIS PRESENCE NOT SPECIFIED: Chronic | ICD-10-CM

## 2018-05-08 DIAGNOSIS — I10 BENIGN ESSENTIAL HYPERTENSION: Chronic | ICD-10-CM

## 2018-05-08 DIAGNOSIS — F41.9 ANXIETY: ICD-10-CM

## 2018-05-08 DIAGNOSIS — J45.30 MILD PERSISTENT ASTHMA WITHOUT COMPLICATION: Chronic | ICD-10-CM

## 2018-05-08 DIAGNOSIS — R00.0 SINUS TACHYCARDIA: Chronic | ICD-10-CM

## 2018-05-08 PROCEDURE — 99214 OFFICE O/P EST MOD 30 MIN: CPT | Performed by: NURSE PRACTITIONER

## 2018-05-08 RX ORDER — ATORVASTATIN CALCIUM 20 MG/1
20 TABLET, FILM COATED ORAL
Qty: 90 TAB | Refills: 3 | Status: SHIPPED | OUTPATIENT
Start: 2018-05-08 | End: 2019-06-29 | Stop reason: SDUPTHER

## 2018-05-08 RX ORDER — LISINOPRIL AND HYDROCHLOROTHIAZIDE 20; 12.5 MG/1; MG/1
1 TABLET ORAL EVERY MORNING
Qty: 90 TAB | Refills: 3 | Status: SHIPPED | OUTPATIENT
Start: 2018-05-08 | End: 2019-06-12 | Stop reason: SDUPTHER

## 2018-05-08 RX ORDER — METFORMIN HYDROCHLORIDE 500 MG/1
500 TABLET, EXTENDED RELEASE ORAL
Qty: 90 TAB | Refills: 3 | Status: SHIPPED | OUTPATIENT
Start: 2018-05-08 | End: 2019-06-14 | Stop reason: SDUPTHER

## 2018-05-08 RX ORDER — ALPRAZOLAM 0.5 MG/1
0.5 TABLET ORAL
Qty: 60 TAB | Refills: 1 | Status: SHIPPED | OUTPATIENT
Start: 2018-05-08 | End: 2018-06-07

## 2018-05-08 RX ORDER — RANITIDINE 150 MG/1
150 TABLET ORAL DAILY
Qty: 90 TAB | Refills: 3 | Status: SHIPPED | OUTPATIENT
Start: 2018-05-08 | End: 2019-06-24 | Stop reason: SDUPTHER

## 2018-05-08 ASSESSMENT — PATIENT HEALTH QUESTIONNAIRE - PHQ9
SUM OF ALL RESPONSES TO PHQ QUESTIONS 1-9: 4
5. POOR APPETITE OR OVEREATING: 0 - NOT AT ALL
CLINICAL INTERPRETATION OF PHQ2 SCORE: 2

## 2018-05-08 NOTE — LETTER
[facility]  JUVENTINO HoRURIEL.  [address] [facility] [address]  [facility] [address]  Fax: [phone]   Authorization for Release/Disclosure of   Protected Health Information   Name: ROSHAN BLAND : 1945 SSN: [id number]   Address: [address] [place] [address] Phone:    [phone] (home)    I authorize the entity listed below to release/disclose the PHI below to:   [facility]/SUPA HoP.RJAYSON and MARKOS Ho   Provider or Entity Name:  [facility]   Address   City, State, Zip   Phone:      Fax:     Reason for request: continuity of care   Information to be released:    [ x ] LAST COLONOSCOPY,  including any PATH REPORT and follow-up  [  ] LAST FIT/COLOGUARD RESULT [  ] LAST DEXA  [  ] LAST MAMMOGRAM  [  ] LAST PAP  [  ] LAST LABS [  ] RETINA EXAM REPORT  [  ] IMMUNIZATION RECORDS  [  ] Release all info      [  ] Check here and initial the line next to each item to release ALL health information INCLUDING  _____ Care and treatment for drug and / or alcohol abuse  _____ HIV testing, infection status, or AIDS  _____ Genetic Testing    DATES OF SERVICE OR TIME PERIOD TO BE DISCLOSED: _____________  I understand and acknowledge that:  * This Authorization may be revoked at any time by you in writing, except if your health information has already been used or disclosed.  * Your health information that will be used or disclosed as a result of you signing this authorization could be re-disclosed by the recipient. If this occurs, your re-disclosed health information may no longer be protected by State or Federal laws.  * You may refuse to sign this Authorization. Your refusal will not affect your ability to obtain treatment.  * This Authorization becomes effective upon signing and will  on (date) __________.      If no date is indicated, this Authorization will  one (1) year from the signature date.    Name: Roshan Bland    Signature:   Date:     5/8/2018       PLEASE FAX REQUESTED RECORDS BACK TO: [phone]

## 2018-05-10 ENCOUNTER — RX ONLY (OUTPATIENT)
Age: 73
Setting detail: RX ONLY
End: 2018-05-10

## 2018-05-10 PROBLEM — E11.9 TYPE 2 DIABETES MELLITUS WITHOUT COMPLICATION, WITHOUT LONG-TERM CURRENT USE OF INSULIN (HCC): Chronic | Status: ACTIVE | Noted: 2018-05-10

## 2018-05-10 PROBLEM — J45.30 MILD PERSISTENT ASTHMA WITHOUT COMPLICATION: Chronic | Status: ACTIVE | Noted: 2017-07-26

## 2018-05-10 PROBLEM — E11.9 TYPE 2 DIABETES MELLITUS WITHOUT COMPLICATION, WITHOUT LONG-TERM CURRENT USE OF INSULIN (HCC): Status: ACTIVE | Noted: 2018-05-10

## 2018-05-10 RX ORDER — DOXEPIN HYDROCHLORIDE 25 MG/1
CAPSULE ORAL
Qty: 90 | Refills: 3 | Status: CANCELLED
Stop reason: CLARIF

## 2018-05-10 NOTE — PROGRESS NOTES
CC: Establish care and medication refill      Judy Bland is a 72 y.o. female here to establish care and to discuss the evaluation and management of:    Previous Provider was Ashli Constantino    1. Type 2 diabetes mellitus without complication, without long-term current use of insulin (HCC)  Patient states that she does have diabetes and she takes her metformin for this. States she's had diabetes for 3 years. She does not check her blood sugar. Denies any neuropathy. States she is tolerating the metformin well. Last A1c was 6.1%.    2. Benign essential hypertension  Patient states that she does have high blood pressure checks her lisinopril/hydrochlorothiazide and diltiazem for this. She does have a blood pressure machine at home however she does not take it. Denies any symptoms of hypotension, chest pain, dizziness, leg swelling.    3. Sinus Tachycardia   Patient states that she is seen by Dr. Rasheed, with cardiology for her tachycardia and blood pressure. States that she just went about 6 weeks ago states that she had a treadmill test that was unremarkable as well as a echocardiogram. She does have some mild left ventricle hypertrophy, did not show any valve disease. Denies any chest pain, angina, lower extremity edema. She does have some shortness of breath with exertion.    Dyslipidemia  Patient states that she takes her atorvastatin for this. Denies any myalgias.    4. Mild persistent asthma without complication  Patient states that she was diagnosed with asthma about 5 years ago. States that she was on some steroids which gave her eczema and that she was on some more steroids and gave her some purple scars all over her arms. States that she follows up with pulmonary at least once a year. States she gets short of breath upstairs. States that she doesn't have any cough or any sputum. States that she takes Symbicort for this however she doesn't feel like it helps that much. Denies any recent asthma  attacks.    5. BACILIO (obstructive sleep apnea)  States she has sleep apnea and she wears a CPAP at night.    6. Gastroesophageal reflux disease, esophagitis presence not specified  Patient states she takes ranitidine for this.    7. Anxiety  Last fill of xanax was on February 28th, 2018      ROS:  Denies any Headache, Blurred Vision, Confusion Chest pain,  Shortness of breath,  Abdominal pain, Changes of bowel or bladder, Lower ext edema, Fevers, Nights sweats, Weight Changes, Focal weakness or numbness.  All other systems are negative. Shortness of breath      Current Outpatient Prescriptions:   •  atorvastatin (LIPITOR) 20 MG Tab, Take 1 Tab by mouth every bedtime., Disp: 90 Tab, Rfl: 3  •  lisinopril-hydrochlorothiazide (PRINZIDE, ZESTORETIC) 20-12.5 MG per tablet, Take 1 Tab by mouth every morning., Disp: 90 Tab, Rfl: 3  •  raNITidine (ZANTAC) 150 MG Tab, Take 1 Tab by mouth every day., Disp: 90 Tab, Rfl: 3  •  metFORMIN ER (GLUCOPHAGE XR) 500 MG TABLET SR 24 HR, Take 1 Tab by mouth every bedtime., Disp: 90 Tab, Rfl: 3  •  ALPRAZolam (XANAX) 0.5 MG Tab, Take 1 Tab by mouth 1 time daily as needed for up to 30 days., Disp: 60 Tab, Rfl: 1  •  RESTASIS 0.05 % ophthalmic emulsion, Place 1 Drop in both eyes 2 times a day., Disp: , Rfl:   •  montelukast (SINGULAIR) 10 MG Tab, TAKE 1 TABLET EVERY EVENING, Disp: 90 Tab, Rfl: 3  •  budesonide-formoterol (SYMBICORT) 160-4.5 MCG/ACT Aerosol, USE 2 INHALATIONS ORALLY   TWICE DAILY WITH SPACER,   RINSE MOUTH AFTER EACH USE, Disp: 3 Inhaler, Rfl: 3  •  doxepin (SINEQUAN) 25 MG Cap, Take 25 mg by mouth every day., Disp: , Rfl:   •  diltiazem (CARDIZEM) 60 MG Tab, TAKE ONE TABLET BY MOUTH EVERY 12 HOURS, Disp: 180 Tab, Rfl: 3  •  hydrOXYzine (ATARAX) 10 MG Tab, , Disp: , Rfl:   •  Cetirizine HCl (ZYRTEC PO), Take 1 Each by mouth every day., Disp: , Rfl:   •  pantoprazole (PROTONIX) 40 MG TBEC, Take 40 mg by mouth 2 times a day.  , Disp: , Rfl:   •  montelukast (SINGULAIR) 10 MG  TABS, Take 10 mg by mouth every day.  , Disp: , Rfl:   •  Acetaminophen (TYLENOL PO), Take  by mouth. EXTRA STRENGH 2 TABS PRN , Disp: , Rfl:   •  Albuterol Sulfate (VENTOLIN HFA INH), Inhale 1 Puff by mouth as needed., Disp: , Rfl:   •  fluticasone (FLONASE) 50 MCG/ACT nasal spray, Spray 1 Spray in nose 2 times a day. Each Nostril , Disp: , Rfl:     Allergies   Allergen Reactions   • Ultram [Tramadol Hcl]      ELEVATED LIVER ENZYMES       Past Medical History:   Diagnosis Date   • Acquired polycythemia 2/15/2012   • Anesthesia     PONV   • Arthritis    • Arthropathy 2/15/2012   • Benign essential hypertension 2/15/2012   • Chronic hepatitis B (HCC) 2/15/2012   • Cough 2/15/2012   • Difficulty breathing 2/15/2012   • Esophageal reflux 2/15/2012   • Heart burn    • Hepatitis B    • Hypercholesterolemia 2/15/2012   • Hyperlipidemia 2/15/2012   • Hypertension    • Indigestion    • Jaundice    • Murmur 2/15/2012   • Obesity 2/15/2012   • BACILIO (obstructive sleep apnea) 2/15/2012   • Sinus tachycardia 2/15/2012     Past Surgical History:   Procedure Laterality Date   • SEPTOPLASTY  3/12/2012    Performed by MEHDI SWAIN at SURGERY SAME DAY [facility] ORS   • TURBINOPLASTY  3/12/2012    Performed by MEDHI SWAIN at SURGERY SAME DAY [facility] ORS   • SINUSOTOMIES  3/12/2012    Performed by MEHDI SWAIN at SURGERY SAME DAY [facility] ORS   • TRIGGER FINGER RELEASE  11/23/2009    Performed by MEHDI ONEIL at SURGERY [facility] ORS   • CARPAL TUNNEL RELEASE  2009    b/L   • KNEE ARTHROPLASTY TOTAL  4/8/08    Performed by ROHAN BLACKWELL at SURGERY [facility] ORS   • KNEE ARTHROPLASTY TOTAL  4/8/08    Performed by ROHAN BLACKWELL at SURGERY [facility] ORS   • CATARACT EXTRACTION WITH IOL  2008    OU   • ROTATOR CUFF REPAIR  2005    left   • TRIGGER FINGER RELEASE  2005    left   • CERVICAL CONIZATION      Loop Electrode Excision   • HAND SURGERY     • PRIMARY C SECTION       Family History   Problem Relation Age of Onset  "  • Other Mother      Family hx of Coronary Arteriosclerosis   • Stroke Mother    • Other Father      Family hx of Coronary Arteriosclerosis   • Heart Attack Father 66     Social History     Social History   • Marital status:      Spouse name: N/A   • Number of children: N/A   • Years of education: N/A     Occupational History   • Not on file.     Social History Main Topics   • Smoking status: Never Smoker   • Smokeless tobacco: Never Used   • Alcohol use 0.6 oz/week     1 Glasses of wine per week      Comment: rarely    • Drug use: No   • Sexual activity: Not Currently     Other Topics Concern   • Not on file     Social History Narrative   • No narrative on file       Objective:     Vitals: /62   Pulse (!) 102   Temp 37.1 °C (98.7 °F)   Resp 16   Ht 1.676 m (5' 6\")   Wt 96.6 kg (213 lb)   SpO2 90%   BMI 34.38 kg/m²      General: Alert, pleasant, NAD  HEENT:  Normocephalic.  Neck supple.  No thyromegaly or masses palpated. No cervical or supraclavicular lymphadenopathy.  Heart:  Regular rate and rhythm.  S1 and S2 normal.  No murmurs appreciated.  Respiratory:  Normal respiratory effort.  Clear to auscultation bilaterally. Skin:  Warm, dry, no rashes  Musculoskeletal:  Gait is normal.  Moves all extremities well.  Extremities:   No leg edema.  Neurological: No tremors, sensation grossly intact  Psych:  Affect/mood is normal, judgement is good, memory is intact, grooming is appropriate.      Assessment and Plan.   72 y.o. female to establish care and discuss the following    1. Type 2 diabetes mellitus without complication, without long-term current use of insulin (HCC)  Chronic. It was recent A1c was 6.1%. Continue current regimen.    2. Benign essential hypertension  Stable. Blood pressure in clinic today is 124/72. Denies any dizziness, chest pain, leg swelling at this time. Continue current regimen.    3. Sinus tachycardia  Stable. Followed by cardiology. Recent stress test unremarkable and " echocardiogram showing left ventricular hypertrophy, no valve disease.     Dyslipidemia  Stable. Denies myalgias. Continue atorvastatin.    4. Mild persistent asthma without complication  Chronic. Well controlled at this time, no asthma attacks, no wheezing or shortness of breath at this time. Continue current regimen.    5. BACILIO (obstructive sleep apnea)  Chronic. Continue using CPAP.    6. Gastroesophageal reflux disease, esophagitis presence not specified  Chronic. Continue taking ranitidine.    7. Anxiety  Narx Check completed, last fill Feb 28th, 2018. Patient uses this sparingly. Patient does have a  at home who does have Alzheimer's and has no short-term memory so can be difficult for her sometimes to manage the household.  - ALPRAZolam (XANAX) 0.5 MG Tab; Take 1 Tab by mouth 1 time daily as needed for up to 30 days.  Dispense: 60 Tab; Refill: 1      Health Maintenance:     Return in about 3 months (around 8/8/2018).          Jessi BURROWS

## 2018-05-16 ENCOUNTER — HOSPITAL ENCOUNTER (OUTPATIENT)
Dept: RADIOLOGY | Facility: MEDICAL CENTER | Age: 73
End: 2018-05-16
Attending: NURSE PRACTITIONER
Payer: MEDICARE

## 2018-05-16 DIAGNOSIS — Z12.39 SCREENING BREAST EXAMINATION: ICD-10-CM

## 2018-05-16 PROCEDURE — 77063 BREAST TOMOSYNTHESIS BI: CPT

## 2018-07-03 DIAGNOSIS — I10 ESSENTIAL HYPERTENSION: ICD-10-CM

## 2018-07-03 DIAGNOSIS — J44.9 CHRONIC OBSTRUCTIVE PULMONARY DISEASE, UNSPECIFIED COPD TYPE (HCC): ICD-10-CM

## 2018-07-05 RX ORDER — BUDESONIDE AND FORMOTEROL FUMARATE DIHYDRATE 160; 4.5 UG/1; UG/1
AEROSOL RESPIRATORY (INHALATION)
Qty: 3 INHALER | Refills: 3 | Status: SHIPPED | OUTPATIENT
Start: 2018-07-05 | End: 2020-01-09

## 2018-07-05 NOTE — TELEPHONE ENCOUNTER
Have we ever prescribed this med? Yes.  If yes, what date? 4/3/17    Last OV: 7/26/17- Deidra Sr     Next OV:     DX: no f/v scheduled     Medications: budesonide-formoterol (SYMBICORT) 160-4.5 MCG

## 2018-07-13 RX ORDER — DILTIAZEM HYDROCHLORIDE 60 MG/1
TABLET, FILM COATED ORAL
Qty: 180 TAB | Refills: 3 | Status: SHIPPED | OUTPATIENT
Start: 2018-07-13 | End: 2019-01-23

## 2018-08-10 ENCOUNTER — HOSPITAL ENCOUNTER (OUTPATIENT)
Dept: RADIOLOGY | Facility: MEDICAL CENTER | Age: 73
End: 2018-08-10
Attending: NURSE PRACTITIONER
Payer: MEDICARE

## 2018-08-10 ENCOUNTER — OFFICE VISIT (OUTPATIENT)
Dept: MEDICAL GROUP | Facility: MEDICAL CENTER | Age: 73
End: 2018-08-10
Payer: MEDICARE

## 2018-08-10 VITALS
WEIGHT: 209.6 LBS | DIASTOLIC BLOOD PRESSURE: 74 MMHG | TEMPERATURE: 98.6 F | HEIGHT: 66 IN | RESPIRATION RATE: 16 BRPM | BODY MASS INDEX: 33.68 KG/M2 | HEART RATE: 102 BPM | OXYGEN SATURATION: 93 % | SYSTOLIC BLOOD PRESSURE: 120 MMHG

## 2018-08-10 DIAGNOSIS — M54.50 CHRONIC BILATERAL LOW BACK PAIN WITHOUT SCIATICA: ICD-10-CM

## 2018-08-10 DIAGNOSIS — M54.2 PAIN IN NECK: ICD-10-CM

## 2018-08-10 DIAGNOSIS — G89.29 CHRONIC BILATERAL LOW BACK PAIN WITHOUT SCIATICA: ICD-10-CM

## 2018-08-10 PROCEDURE — 72100 X-RAY EXAM L-S SPINE 2/3 VWS: CPT

## 2018-08-10 PROCEDURE — 99213 OFFICE O/P EST LOW 20 MIN: CPT | Performed by: NURSE PRACTITIONER

## 2018-08-10 PROCEDURE — 72040 X-RAY EXAM NECK SPINE 2-3 VW: CPT

## 2018-08-10 NOTE — PROGRESS NOTES
cc:  Back pain      Subjective:     HPI:     Judy Bland is a 72 y.o. female here to discuss the evaluation and management of:    Low back pain  Patient states she has been having some lower back pain for about one year. States that she will have to stop and rest/sit when walking due to pain. No numbness or tingling down legs, loss of bowel or bladder. Has had a lot of steriods in the past for various reasons, nervous if she was going to be treated with steroids.     Left side neck  Has been having this intermittent left sided neck pain for the last year. It is sharp pain.  Lasts for about 15 minutes. Tries to use heat on it to help.Denies trauma or injury.     ROS:  Denies any Headache, Blurred Vision, Confusion, Chest pain,  Shortness of breath,  Abdominal pain, Changes of bowel or bladder, Lower ext edema, Fevers, Nights sweats, Weight Changes, Focal weakness or numbness.  All other systems are negative.neck and back pain.         Current Outpatient Prescriptions:   •  DILTIAZem (CARDIZEM) 60 MG Tab, TAKE 1 TABLET EVERY 12     HOURS, Disp: 180 Tab, Rfl: 3  •  SYMBICORT 160-4.5 MCG/ACT Aerosol, USE 2 INHALATIONS ORALLY   TWICE DAILY WITH SPACER,   RINSE MOUTH AFTER EACH USE., Disp: 3 Inhaler, Rfl: 3  •  atorvastatin (LIPITOR) 20 MG Tab, Take 1 Tab by mouth every bedtime., Disp: 90 Tab, Rfl: 3  •  lisinopril-hydrochlorothiazide (PRINZIDE, ZESTORETIC) 20-12.5 MG per tablet, Take 1 Tab by mouth every morning., Disp: 90 Tab, Rfl: 3  •  raNITidine (ZANTAC) 150 MG Tab, Take 1 Tab by mouth every day., Disp: 90 Tab, Rfl: 3  •  metFORMIN ER (GLUCOPHAGE XR) 500 MG TABLET SR 24 HR, Take 1 Tab by mouth every bedtime., Disp: 90 Tab, Rfl: 3  •  RESTASIS 0.05 % ophthalmic emulsion, Place 1 Drop in both eyes 2 times a day., Disp: , Rfl:   •  montelukast (SINGULAIR) 10 MG Tab, TAKE 1 TABLET EVERY EVENING, Disp: 90 Tab, Rfl: 3  •  doxepin (SINEQUAN) 25 MG Cap, Take 25 mg by mouth every day., Disp: , Rfl:   •  diltiazem  (CARDIZEM) 60 MG Tab, TAKE ONE TABLET BY MOUTH EVERY 12 HOURS, Disp: 180 Tab, Rfl: 3  •  hydrOXYzine (ATARAX) 10 MG Tab, , Disp: , Rfl:   •  Cetirizine HCl (ZYRTEC PO), Take 1 Each by mouth every day., Disp: , Rfl:   •  pantoprazole (PROTONIX) 40 MG TBEC, Take 40 mg by mouth 2 times a day.  , Disp: , Rfl:   •  montelukast (SINGULAIR) 10 MG TABS, Take 10 mg by mouth every day.  , Disp: , Rfl:   •  Acetaminophen (TYLENOL PO), Take  by mouth. EXTRA STRENGH 2 TABS PRN , Disp: , Rfl:   •  Albuterol Sulfate (VENTOLIN HFA INH), Inhale 1 Puff by mouth as needed., Disp: , Rfl:   •  fluticasone (FLONASE) 50 MCG/ACT nasal spray, Spray 1 Spray in nose 2 times a day. Each Nostril , Disp: , Rfl:     Allergies   Allergen Reactions   • Ultram [Tramadol Hcl]      ELEVATED LIVER ENZYMES       Past Medical History:   Diagnosis Date   • Acquired polycythemia 2/15/2012   • Anesthesia     PONV   • Arthritis    • Arthropathy 2/15/2012   • Benign essential hypertension 2/15/2012   • Chronic hepatitis B (HCC) 2/15/2012   • Cough 2/15/2012   • Difficulty breathing 2/15/2012   • Esophageal reflux 2/15/2012   • Heart burn    • Hepatitis B    • Hypercholesterolemia 2/15/2012   • Hyperlipidemia 2/15/2012   • Hypertension    • Indigestion    • Jaundice    • Murmur 2/15/2012   • Obesity 2/15/2012   • BACILIO (obstructive sleep apnea) 2/15/2012   • Sinus tachycardia 2/15/2012     Past Surgical History:   Procedure Laterality Date   • SEPTOPLASTY  3/12/2012    Performed by MEHDI SWAIN at SURGERY SAME DAY [facility] ORS   • TURBINOPLASTY  3/12/2012    Performed by MEHDI SWAIN at SURGERY SAME DAY [facility] ORS   • SINUSOTOMIES  3/12/2012    Performed by MEHDI SWAIN at SURGERY SAME DAY [facility] ORS   • TRIGGER FINGER RELEASE  11/23/2009    Performed by MEHDI ONEIL at SURGERY [facility] ORS   • CARPAL TUNNEL RELEASE  2009    b/L   • KNEE ARTHROPLASTY TOTAL  4/8/08    Performed by ROHAN BLACKWELL at SURGERY [facility] ORS   • KNEE ARTHROPLASTY  "TOTAL  4/8/08    Performed by ROHAN BLACKWELL at SURGERY [facility] ORS   • CATARACT EXTRACTION WITH IOL  2008    OU   • ROTATOR CUFF REPAIR  2005    left   • TRIGGER FINGER RELEASE  2005    left   • CERVICAL CONIZATION      Loop Electrode Excision   • HAND SURGERY     • PRIMARY C SECTION       Family History   Problem Relation Age of Onset   • Other Mother         Family hx of Coronary Arteriosclerosis   • Stroke Mother    • Other Father         Family hx of Coronary Arteriosclerosis   • Heart Attack Father 66     Social History     Social History   • Marital status:      Spouse name: N/A   • Number of children: N/A   • Years of education: N/A     Occupational History   • Not on file.     Social History Main Topics   • Smoking status: Never Smoker   • Smokeless tobacco: Never Used   • Alcohol use 0.6 oz/week     1 Glasses of wine per week      Comment: rarely    • Drug use: No   • Sexual activity: Not Currently     Other Topics Concern   • Not on file     Social History Narrative   • No narrative on file       Objective:     Vitals: /74   Pulse (!) 102   Temp 37 °C (98.6 °F)   Resp 16   Ht 1.676 m (5' 6\")   Wt 95.1 kg (209 lb 9.6 oz)   SpO2 93%   BMI 33.83 kg/m²    General: Alert, pleasant, NAD  HEENT: Normocephalic.    Skin: Warm, dry, no rashes.  Musculoskeletal: Gait is normal.  Moves all extremities well.  Extremities: No leg edema.     Neurological: No tremors, sensation grossly intact  Psych:  Affect/mood is normal, judgement is good, memory is intact, grooming is appropriate.    Assessment/Plan:     Judy was seen today for back pain    Chronic bilateral low back pain without sciatica  Chronic. No sciatica present. Advised stretching, heat and offered PT and referral to Spine specialist. Will complete Xray first.   -     DX-LUMBAR SPINE-2 OR 3 VIEWS; Future    Pain in neck  Chronic. Atraumatic in nature. Possible muscle strain versus cervical degeneration/bone spur or stenosis. Will " start with Xray then likely PT and spine specialist referral.   -     DX-CERVICAL SPINE-2 OR 3 VIEWS; Future       Health care Maintenance:     Return if symptoms worsen or fail to improve.          Jessi SIDHU.

## 2018-08-14 ENCOUNTER — TELEPHONE (OUTPATIENT)
Dept: MEDICAL GROUP | Facility: MEDICAL CENTER | Age: 73
End: 2018-08-14

## 2018-08-14 DIAGNOSIS — M54.50 CHRONIC MIDLINE LOW BACK PAIN WITHOUT SCIATICA: ICD-10-CM

## 2018-08-14 DIAGNOSIS — G89.29 CHRONIC MIDLINE LOW BACK PAIN WITHOUT SCIATICA: ICD-10-CM

## 2018-08-14 DIAGNOSIS — M47.812 SPONDYLOSIS OF CERVICAL REGION WITHOUT MYELOPATHY OR RADICULOPATHY: ICD-10-CM

## 2018-08-14 NOTE — TELEPHONE ENCOUNTER
----- Message from Deidar Fox, Med Ass't sent at 8/14/2018  8:55 AM PDT -----  Patient would like to have the referral sent. She also was wondering if the results include her lower back also?

## 2018-08-14 NOTE — PROGRESS NOTES
Patient would like to have the referral sent. She also was wondering if the results include her lower back also?

## 2018-08-29 ENCOUNTER — HOSPITAL ENCOUNTER (OUTPATIENT)
Dept: RADIOLOGY | Facility: MEDICAL CENTER | Age: 73
End: 2018-08-29
Attending: NURSE PRACTITIONER
Payer: MEDICARE

## 2018-08-29 DIAGNOSIS — M54.2 CERVICALGIA: ICD-10-CM

## 2018-08-29 DIAGNOSIS — M54.5 LOW BACK PAIN, UNSPECIFIED BACK PAIN LATERALITY, UNSPECIFIED CHRONICITY, WITH SCIATICA PRESENCE UNSPECIFIED: ICD-10-CM

## 2018-08-29 PROCEDURE — 72040 X-RAY EXAM NECK SPINE 2-3 VW: CPT

## 2018-08-29 PROCEDURE — 72100 X-RAY EXAM L-S SPINE 2/3 VWS: CPT

## 2018-08-29 PROCEDURE — 72141 MRI NECK SPINE W/O DYE: CPT

## 2018-08-29 PROCEDURE — 72148 MRI LUMBAR SPINE W/O DYE: CPT

## 2018-09-27 DIAGNOSIS — R06.02 SHORTNESS OF BREATH: ICD-10-CM

## 2018-09-28 RX ORDER — MONTELUKAST SODIUM 10 MG/1
TABLET ORAL
Qty: 90 TAB | Refills: 0 | Status: SHIPPED | OUTPATIENT
Start: 2018-09-28 | End: 2019-01-23 | Stop reason: SDUPTHER

## 2018-09-28 NOTE — TELEPHONE ENCOUNTER
Have we ever prescribed this med? Yes.  If yes, what date? 09/05/2017    Last OV: 07/26/2017-[facility]    Next OV: No appointment on file-   Plan:  1. Trial of Breo 200mcg 1 puff QD. RX and sample RX given. Continue JANETT prn. IF Breo not effective, return to Symbicort 160/4.5mcg 2puffs BID with spacer, rinse mouth.  2. Continue zyrtec/singluair qhs.  3. DME order mask/supplies. Continue cpap nightly.  4. DME AHI download now.  5. Discussed respiratory and sleep hygiene.  6. Encouraged weight loss with dietary changes and walking.  7. Follow up in 1 year with compliance card, sooner if needed.    DX: Shortness of breath (R06.02)    Medications:   Requested Prescriptions     Pending Prescriptions Disp Refills   • montelukast (SINGULAIR) 10 MG Tab [Pharmacy Med Name: MONTELUKAST  TAB 10MG]  3     Sig: TAKE 1 TABLET EVERY EVENING

## 2018-11-12 ENCOUNTER — OFFICE VISIT (OUTPATIENT)
Dept: MEDICAL GROUP | Facility: MEDICAL CENTER | Age: 73
End: 2018-11-12
Payer: MEDICARE

## 2018-11-12 VITALS
TEMPERATURE: 98.2 F | DIASTOLIC BLOOD PRESSURE: 68 MMHG | WEIGHT: 213 LBS | OXYGEN SATURATION: 100 % | SYSTOLIC BLOOD PRESSURE: 122 MMHG | BODY MASS INDEX: 34.23 KG/M2 | HEIGHT: 66 IN | HEART RATE: 107 BPM | RESPIRATION RATE: 16 BRPM

## 2018-11-12 DIAGNOSIS — M47.812 SPONDYLOSIS OF CERVICAL REGION WITHOUT MYELOPATHY OR RADICULOPATHY: ICD-10-CM

## 2018-11-12 DIAGNOSIS — G89.29 CHRONIC MIDLINE LOW BACK PAIN WITHOUT SCIATICA: ICD-10-CM

## 2018-11-12 DIAGNOSIS — D75.1 ACQUIRED POLYCYTHEMIA: Chronic | ICD-10-CM

## 2018-11-12 DIAGNOSIS — I10 BENIGN ESSENTIAL HYPERTENSION: Chronic | ICD-10-CM

## 2018-11-12 DIAGNOSIS — J45.30 MILD PERSISTENT ASTHMA WITHOUT COMPLICATION: Chronic | ICD-10-CM

## 2018-11-12 DIAGNOSIS — E11.9 TYPE 2 DIABETES MELLITUS WITHOUT COMPLICATION, WITHOUT LONG-TERM CURRENT USE OF INSULIN (HCC): Chronic | ICD-10-CM

## 2018-11-12 DIAGNOSIS — M54.50 CHRONIC MIDLINE LOW BACK PAIN WITHOUT SCIATICA: ICD-10-CM

## 2018-11-12 DIAGNOSIS — E78.5 DYSLIPIDEMIA: Chronic | ICD-10-CM

## 2018-11-12 PROCEDURE — 99214 OFFICE O/P EST MOD 30 MIN: CPT | Performed by: NURSE PRACTITIONER

## 2018-11-12 NOTE — PROGRESS NOTES
cc:  Follow up back pain      Subjective:     HPI:     Judy Bland is a 73 y.o. female here to discuss the evaluation and management of:    Low back pain  Patient states that she went to follow-up with pain management.  She was told that there is nothing essentially surgically that can do for her.  They are thinking about doing some facet injections.  Denies any radiating pain., left sided neck pain    Neck pain  Patient states that she feels like physical therapy is working.  She has 4 more sessions.  She does state that when they do the massage is on her neck it does feel a lot better.      Lump on left elbow   Patient states that she has this lump on her left elbow for the last few months.  States that it is tender to touch.  Denies any trauma or injury.      Diabetes  Patient states that she has been taking her metformin and tolerating this.    Blood pressure  Patient states that she has been taking her diltiazem, lisinopril/HCTZ for this.  Denies any chest pain, shortness of breath or dizziness.    Cholesterol  Patient states his been taking atorvastatin without any myalgias.    Asthma  Patient states that she has been using her inhaler as needed and taking her montelukast daily.  A denies any recent asthma attacks.       ROS:  Denies any Headache, Blurred Vision, Confusion, Chest pain,  Shortness of breath,  Abdominal pain, Changes of bowel or bladder, Lower ext edema, Fevers, Nights sweats, Weight Changes, Focal weakness or numbness.  And all other systems are negative.        Current Outpatient Prescriptions:   •  montelukast (SINGULAIR) 10 MG Tab, TAKE 1 TABLET EVERY EVENING, Disp: 90 Tab, Rfl: 0  •  SYMBICORT 160-4.5 MCG/ACT Aerosol, USE 2 INHALATIONS ORALLY   TWICE DAILY WITH SPACER,   RINSE MOUTH AFTER EACH USE., Disp: 3 Inhaler, Rfl: 3  •  atorvastatin (LIPITOR) 20 MG Tab, Take 1 Tab by mouth every bedtime., Disp: 90 Tab, Rfl: 3  •  lisinopril-hydrochlorothiazide (PRINZIDE, ZESTORETIC) 20-12.5  MG per tablet, Take 1 Tab by mouth every morning., Disp: 90 Tab, Rfl: 3  •  raNITidine (ZANTAC) 150 MG Tab, Take 1 Tab by mouth every day., Disp: 90 Tab, Rfl: 3  •  metFORMIN ER (GLUCOPHAGE XR) 500 MG TABLET SR 24 HR, Take 1 Tab by mouth every bedtime., Disp: 90 Tab, Rfl: 3  •  RESTASIS 0.05 % ophthalmic emulsion, Place 1 Drop in both eyes 2 times a day., Disp: , Rfl:   •  doxepin (SINEQUAN) 25 MG Cap, Take 25 mg by mouth every day., Disp: , Rfl:   •  hydrOXYzine (ATARAX) 10 MG Tab, , Disp: , Rfl:   •  Cetirizine HCl (ZYRTEC PO), Take 1 Each by mouth every day., Disp: , Rfl:   •  pantoprazole (PROTONIX) 40 MG TBEC, Take 40 mg by mouth 2 times a day.  , Disp: , Rfl:   •  montelukast (SINGULAIR) 10 MG TABS, Take 10 mg by mouth every day.  , Disp: , Rfl:   •  Acetaminophen (TYLENOL PO), Take  by mouth. EXTRA STRENGH 2 TABS PRN , Disp: , Rfl:   •  Albuterol Sulfate (VENTOLIN HFA INH), Inhale 1 Puff by mouth as needed., Disp: , Rfl:   •  fluticasone (FLONASE) 50 MCG/ACT nasal spray, Spray 1 Spray in nose 2 times a day. Each Nostril , Disp: , Rfl:   •  DILTIAZem (CARDIZEM) 60 MG Tab, TAKE 1 TABLET EVERY 12     HOURS, Disp: 180 Tab, Rfl: 3  •  diltiazem (CARDIZEM) 60 MG Tab, TAKE ONE TABLET BY MOUTH EVERY 12 HOURS, Disp: 180 Tab, Rfl: 3    Allergies   Allergen Reactions   • Ultram [Tramadol Hcl]      ELEVATED LIVER ENZYMES       Past Medical History:   Diagnosis Date   • Acquired polycythemia 2/15/2012   • Anesthesia     PONV   • Arthritis    • Arthropathy 2/15/2012   • Benign essential hypertension 2/15/2012   • Chronic hepatitis B (HCC) 2/15/2012   • Cough 2/15/2012   • Difficulty breathing 2/15/2012   • Esophageal reflux 2/15/2012   • Heart burn    • Hepatitis B    • Hypercholesterolemia 2/15/2012   • Hyperlipidemia 2/15/2012   • Hypertension    • Indigestion    • Jaundice    • Murmur 2/15/2012   • Obesity 2/15/2012   • BACILIO (obstructive sleep apnea) 2/15/2012   • Sinus tachycardia 2/15/2012     Past Surgical History:  "  Procedure Laterality Date   • SEPTOPLASTY  3/12/2012    Performed by MEHDI SWAIN at SURGERY SAME DAY [facility] ORS   • TURBINOPLASTY  3/12/2012    Performed by MEHDI SWAIN at SURGERY SAME DAY [facility] ORS   • SINUSOTOMIES  3/12/2012    Performed by MEHDI SWAIN at SURGERY SAME DAY [facility] ORS   • TRIGGER FINGER RELEASE  11/23/2009    Performed by MEHDI ONEIL at SURGERY [facility] ORS   • CARPAL TUNNEL RELEASE  2009    b/L   • KNEE ARTHROPLASTY TOTAL  4/8/08    Performed by ROHAN BLACKWELL at SURGERY [facility] ORS   • KNEE ARTHROPLASTY TOTAL  4/8/08    Performed by ROHAN BLACKWELL at SURGERY [facility] ORS   • CATARACT EXTRACTION WITH IOL  2008    OU   • ROTATOR CUFF REPAIR  2005    left   • TRIGGER FINGER RELEASE  2005    left   • CERVICAL CONIZATION      Loop Electrode Excision   • HAND SURGERY     • PRIMARY C SECTION       Family History   Problem Relation Age of Onset   • Other Mother         Family hx of Coronary Arteriosclerosis   • Stroke Mother    • Other Father         Family hx of Coronary Arteriosclerosis   • Heart Attack Father 66     Social History     Social History   • Marital status:      Spouse name: N/A   • Number of children: N/A   • Years of education: N/A     Occupational History   • Not on file.     Social History Main Topics   • Smoking status: Never Smoker   • Smokeless tobacco: Never Used   • Alcohol use 0.6 oz/week     1 Glasses of wine per week      Comment: rarely    • Drug use: No   • Sexual activity: Not Currently     Other Topics Concern   • Not on file     Social History Narrative   • No narrative on file       Objective:     Vitals: /68 (BP Location: Right arm, Patient Position: Sitting)   Pulse (!) 107   Temp 36.8 °C (98.2 °F)   Resp 16   Ht 1.676 m (5' 6\")   Wt 96.6 kg (213 lb)   SpO2 100%   BMI 34.38 kg/m²    General: Alert, pleasant, NAD  HEENT: Normocephalic.    Heart: Regular rate and rhythm.  S1 and S2 normal.  No murmurs " appreciated.  Respiratory: Normal respiratory effort.  Clear to auscultation bilaterally.  Skin: Warm, dry, no rashes.  Musculoskeletal: Gait is normal.  Moves all extremities well.  Extremities: No leg edema. No discoloration left antecubital fossa with a small soft tender Axis tissue no palpable defined mass  Neurological: No tremors, sensation grossly intact  Psych:  Affect/mood is normal, judgement is good, memory is intact, grooming is appropriate.    Assessment/Plan:     Judy was seen today for follow-up.    Diagnoses and all orders for this visit:    Spondylosis of cervical region without myelopathy or radiculopathy  Chronic.  Following up with pain management.  Encourage physical therapy, stretching, heat.    Chronic midline low back pain without sciatica  Chronic.  Following up with pain management.  Planning on getting facet injections.  She is not a surgical candidate per her pain management.    Lump  Benign-appearing soft palpable tender area on the left antecubital fossa.  No bruising, no crepitus, no palpable defined lump  -     US-EXTREMITY NON VASCULAR UNILATERAL LEFT; Future    Type 2 diabetes mellitus without complication, without long-term current use of insulin (HCC)  Stable.  Continue current regimen.  Labs ordered.  Monofilament exam completed in clinic with intact sensation.  -   : MICROALBUMIN CREAT RATIO URINE; Future  -     HEMOGLOBIN A1C; Future  -     Diabetic Monofilament Lower Extremity Exam  -     MICROALBUMIN CREAT RATIO URINE; Future    Mild persistent asthma without complication  Stable.  Continue current regimen    Dyslipidemia  Stable.  Continue atorvastatin.  -     Lipid Profile; Future    Benign essential hypertension  Stable.  Continue current regimen labs ordered.  -     TSH WITH REFLEX TO FT4; Future  -     COMP METABOLIC PANEL; Future    Acquired polycythemia  -     CBC WITHOUT DIFFERENTIAL; Future          Return in about 6 months (around 5/12/2019).          Jessi  Tank BURROWS

## 2018-11-12 NOTE — LETTER
[facility]  JUVENTINO HoRURIEL.  [address] [facility] [address]  [place] [address]  Fax: [phone]   Authorization for Release/Disclosure of   Protected Health Information   Name: ROSHAN WORTHINGTON : 1945 SSN: [id number]   Address: [id number] [address] Phone:    [phone] (home)    I authorize the entity listed below to release/disclose the PHI below to:   [facility]/JUVENTINO HoRJAYSON and MARKOS Ho   Provider or Entity Name:  Dr. Chamberlain  (eye clinic)   Address   City, [facility], [facility] Phone:      Fax:     Reason for request: continuity of care   Information to be released:    [  ] LAST COLONOSCOPY,  including any PATH REPORT and follow-up  [  ] LAST FIT/COLOGUARD RESULT [  ] LAST DEXA  [  ] LAST MAMMOGRAM  [  ] LAST PAP  [  ] LAST LABS [ x ] RETINA EXAM REPORT  [  ] IMMUNIZATION RECORDS  [  ] Release all info      [  ] Check here and initial the line next to each item to release ALL health information INCLUDING  _____ Care and treatment for drug and / or alcohol abuse  _____ HIV testing, infection status, or AIDS  _____ Genetic Testing    DATES OF SERVICE OR TIME PERIOD TO BE DISCLOSED: _____________  I understand and acknowledge that:  * This Authorization may be revoked at any time by you in writing, except if your health information has already been used or disclosed.  * Your health information that will be used or disclosed as a result of you signing this authorization could be re-disclosed by the recipient. If this occurs, your re-disclosed health information may no longer be protected by State or Federal laws.  * You may refuse to sign this Authorization. Your refusal will not affect your ability to obtain treatment.  * This Authorization becomes effective upon signing and will  on (date) __________.      If no date is indicated, this Authorization will  one (1) year from the signature date.       Name: Judy Calvillo Baldo    Signature:   Date:     11/12/2018       PLEASE FAX REQUESTED RECORDS BACK TO: [phone]

## 2018-11-13 LAB
ALBUMIN SERPL-MCNC: 4.7 G/DL (ref 3.5–4.8)
ALBUMIN/GLOB SERPL: 1.8 {RATIO} (ref 1.2–2.2)
ALP SERPL-CCNC: 99 IU/L (ref 39–117)
ALT SERPL-CCNC: 45 IU/L (ref 0–32)
AST SERPL-CCNC: 32 IU/L (ref 0–40)
BASOPHILS # BLD AUTO: 0.1 X10E3/UL (ref 0–0.2)
BASOPHILS NFR BLD AUTO: 1 %
BILIRUB SERPL-MCNC: 0.5 MG/DL (ref 0–1.2)
BUN SERPL-MCNC: 16 MG/DL (ref 8–27)
BUN/CREAT SERPL: 19 (ref 12–28)
CALCIUM SERPL-MCNC: 9.9 MG/DL (ref 8.7–10.3)
CHLORIDE SERPL-SCNC: 101 MMOL/L (ref 96–106)
CHOLEST SERPL-MCNC: 169 MG/DL (ref 100–199)
CO2 SERPL-SCNC: 23 MMOL/L (ref 20–29)
CREAT SERPL-MCNC: 0.83 MG/DL (ref 0.57–1)
EOSINOPHIL # BLD AUTO: 0.2 X10E3/UL (ref 0–0.4)
EOSINOPHIL NFR BLD AUTO: 2 %
ERYTHROCYTE [DISTWIDTH] IN BLOOD BY AUTOMATED COUNT: 14.5 % (ref 12.3–15.4)
GLOBULIN SER CALC-MCNC: 2.6 G/DL (ref 1.5–4.5)
GLUCOSE SERPL-MCNC: 120 MG/DL (ref 65–99)
HBA1C MFR BLD: 6.9 % (ref 4.8–5.6)
HCT VFR BLD AUTO: 48.3 % (ref 34–46.6)
HDLC SERPL-MCNC: 54 MG/DL
HGB BLD-MCNC: 16.2 G/DL (ref 11.1–15.9)
IF AFRICAN AMERICAN  100797: 81 ML/MIN/1.73
IF NON AFRICAN AMER 100791: 70 ML/MIN/1.73
IMM GRANULOCYTES # BLD: 0 X10E3/UL (ref 0–0.1)
IMM GRANULOCYTES NFR BLD: 0 %
IMMATURE CELLS  115398: ABNORMAL
LABORATORY COMMENT REPORT: ABNORMAL
LDLC SERPL CALC-MCNC: 77 MG/DL (ref 0–99)
LYMPHOCYTES # BLD AUTO: 2.5 X10E3/UL (ref 0.7–3.1)
LYMPHOCYTES NFR BLD AUTO: 29 %
MCH RBC QN AUTO: 31 PG (ref 26.6–33)
MCHC RBC AUTO-ENTMCNC: 33.5 G/DL (ref 31.5–35.7)
MCV RBC AUTO: 92 FL (ref 79–97)
MONOCYTES # BLD AUTO: 0.5 X10E3/UL (ref 0.1–0.9)
MONOCYTES NFR BLD AUTO: 6 %
MORPHOLOGY BLD-IMP: ABNORMAL
NEUTROPHILS # BLD AUTO: 5.3 X10E3/UL (ref 1.4–7)
NEUTROPHILS NFR BLD AUTO: 62 %
NRBC BLD AUTO-RTO: ABNORMAL %
PLATELET # BLD AUTO: 262 X10E3/UL (ref 150–379)
POTASSIUM SERPL-SCNC: 4.1 MMOL/L (ref 3.5–5.2)
PROT SERPL-MCNC: 7.3 G/DL (ref 6–8.5)
RBC # BLD AUTO: 5.23 X10E6/UL (ref 3.77–5.28)
SODIUM SERPL-SCNC: 140 MMOL/L (ref 134–144)
TRIGL SERPL-MCNC: 189 MG/DL (ref 0–149)
TSH SERPL DL<=0.005 MIU/L-ACNC: 1.89 UIU/ML (ref 0.45–4.5)
VLDLC SERPL CALC-MCNC: 38 MG/DL (ref 5–40)
WBC # BLD AUTO: 8.6 X10E3/UL (ref 3.4–10.8)

## 2018-11-14 ENCOUNTER — HOSPITAL ENCOUNTER (OUTPATIENT)
Dept: RADIOLOGY | Facility: MEDICAL CENTER | Age: 73
End: 2018-11-14
Attending: NURSE PRACTITIONER
Payer: MEDICARE

## 2018-11-14 PROCEDURE — 76882 US LMTD JT/FCL EVL NVASC XTR: CPT | Mod: LT

## 2019-01-05 ENCOUNTER — OFFICE VISIT (OUTPATIENT)
Dept: URGENT CARE | Facility: PHYSICIAN GROUP | Age: 74
End: 2019-01-05
Payer: MEDICARE

## 2019-01-05 VITALS
HEART RATE: 100 BPM | HEIGHT: 66 IN | WEIGHT: 210 LBS | DIASTOLIC BLOOD PRESSURE: 70 MMHG | BODY MASS INDEX: 33.75 KG/M2 | OXYGEN SATURATION: 92 % | TEMPERATURE: 98 F | SYSTOLIC BLOOD PRESSURE: 118 MMHG | RESPIRATION RATE: 20 BRPM

## 2019-01-05 DIAGNOSIS — J20.9 ACUTE BRONCHITIS, UNSPECIFIED ORGANISM: ICD-10-CM

## 2019-01-05 PROCEDURE — 99214 OFFICE O/P EST MOD 30 MIN: CPT | Performed by: PHYSICIAN ASSISTANT

## 2019-01-05 RX ORDER — METHYLPREDNISOLONE 4 MG/1
TABLET ORAL
Qty: 1 KIT | Refills: 0 | Status: SHIPPED | OUTPATIENT
Start: 2019-01-05 | End: 2019-01-23

## 2019-01-05 RX ORDER — IPRATROPIUM BROMIDE AND ALBUTEROL SULFATE 2.5; .5 MG/3ML; MG/3ML
3 SOLUTION RESPIRATORY (INHALATION) ONCE
Status: COMPLETED | OUTPATIENT
Start: 2019-01-05 | End: 2019-01-05

## 2019-01-05 RX ORDER — BENZONATATE 100 MG/1
100-200 CAPSULE ORAL 3 TIMES DAILY PRN
Qty: 60 CAP | Refills: 0 | Status: SHIPPED | OUTPATIENT
Start: 2019-01-05 | End: 2019-03-15

## 2019-01-05 RX ADMIN — IPRATROPIUM BROMIDE AND ALBUTEROL SULFATE 3 ML: 2.5; .5 SOLUTION RESPIRATORY (INHALATION) at 12:10

## 2019-01-05 ASSESSMENT — ENCOUNTER SYMPTOMS
FEVER: 0
DIZZINESS: 0
WHEEZING: 1
SPUTUM PRODUCTION: 0
MUSCULOSKELETAL NEGATIVE: 1
CHILLS: 0
ABDOMINAL PAIN: 0
SHORTNESS OF BREATH: 0
DIARRHEA: 0
NAUSEA: 0
VOMITING: 0
SORE THROAT: 0
COUGH: 1

## 2019-01-05 ASSESSMENT — COPD QUESTIONNAIRES: COPD: 0

## 2019-01-05 NOTE — PROGRESS NOTES
"Subjective:      Judy Bland is a 73 y.o. female who presents with Cough (x 4 days// wheezing// chest congestion  )            Cough   This is a new problem. The current episode started in the past 7 days (4 days). The problem has been unchanged. The problem occurs every few minutes. The cough is non-productive. Associated symptoms include nasal congestion, postnasal drip and wheezing. Pertinent negatives include no chest pain, chills, ear pain, fever, rash, sore throat or shortness of breath. Nothing aggravates the symptoms. She has tried OTC cough suppressant for the symptoms. The treatment provided mild relief. Her past medical history is significant for asthma. There is no history of COPD or pneumonia.       Review of Systems   Constitutional: Negative for chills and fever.   HENT: Positive for postnasal drip. Negative for congestion, ear pain and sore throat.    Respiratory: Positive for cough and wheezing. Negative for sputum production and shortness of breath.    Cardiovascular: Negative for chest pain.   Gastrointestinal: Negative for abdominal pain, diarrhea, nausea and vomiting.   Genitourinary: Negative.    Musculoskeletal: Negative.    Skin: Negative for rash.   Neurological: Negative for dizziness.          Objective:     /70 (BP Location: Left arm, Patient Position: Sitting)   Pulse 100   Temp 36.7 °C (98 °F) (Temporal)   Resp 20   Ht 1.676 m (5' 6\")   Wt 95.3 kg (210 lb)   SpO2 92%   BMI 33.89 kg/m²      Physical Exam   Constitutional: She is oriented to person, place, and time. She appears well-developed and well-nourished. No distress.   HENT:   Head: Normocephalic and atraumatic.   Right Ear: Hearing, tympanic membrane, external ear and ear canal normal.   Left Ear: Hearing, tympanic membrane, external ear and ear canal normal.   Mouth/Throat: Oropharynx is clear and moist. No oropharyngeal exudate, posterior oropharyngeal edema or posterior oropharyngeal erythema.   Eyes: " Pupils are equal, round, and reactive to light. Conjunctivae are normal. Right eye exhibits no discharge. Left eye exhibits no discharge.   Neck: Normal range of motion.   Cardiovascular: Normal rate, regular rhythm and normal heart sounds.    No murmur heard.  Pulmonary/Chest: Effort normal. No respiratory distress. She has wheezes. She has no rales.   Musculoskeletal: Normal range of motion.   Lymphadenopathy:     She has no cervical adenopathy.   Neurological: She is alert and oriented to person, place, and time.   Skin: Skin is warm and dry. She is not diaphoretic.   Psychiatric: She has a normal mood and affect. Her behavior is normal.   Nursing note and vitals reviewed.            PMH:  has a past medical history of Acquired polycythemia (2/15/2012); Anesthesia; Arthritis; Arthropathy (2/15/2012); Benign essential hypertension (2/15/2012); Chronic hepatitis B (HCC) (2/15/2012); Cough (2/15/2012); Difficulty breathing (2/15/2012); Esophageal reflux (2/15/2012); Heart burn; Hepatitis B; Hypercholesterolemia (2/15/2012); Hyperlipidemia (2/15/2012); Hypertension; Indigestion; Jaundice; Murmur (2/15/2012); Obesity (2/15/2012); BACILIO (obstructive sleep apnea) (2/15/2012); and Sinus tachycardia (2/15/2012).  MEDS:   Current Outpatient Prescriptions:   •  MethylPREDNISolone (MEDROL DOSEPAK) 4 MG Tablet Therapy Pack, Take as directed, Disp: 1 Kit, Rfl: 0  •  benzonatate (TESSALON) 100 MG Cap, Take 1-2 Caps by mouth 3 times a day as needed., Disp: 60 Cap, Rfl: 0  •  montelukast (SINGULAIR) 10 MG Tab, TAKE 1 TABLET EVERY EVENING, Disp: 90 Tab, Rfl: 0  •  DILTIAZem (CARDIZEM) 60 MG Tab, TAKE 1 TABLET EVERY 12     HOURS, Disp: 180 Tab, Rfl: 3  •  SYMBICORT 160-4.5 MCG/ACT Aerosol, USE 2 INHALATIONS ORALLY   TWICE DAILY WITH SPACER,   RINSE MOUTH AFTER EACH USE., Disp: 3 Inhaler, Rfl: 3  •  atorvastatin (LIPITOR) 20 MG Tab, Take 1 Tab by mouth every bedtime., Disp: 90 Tab, Rfl: 3  •  lisinopril-hydrochlorothiazide (PRINZIDE,  ZESTORETIC) 20-12.5 MG per tablet, Take 1 Tab by mouth every morning., Disp: 90 Tab, Rfl: 3  •  raNITidine (ZANTAC) 150 MG Tab, Take 1 Tab by mouth every day., Disp: 90 Tab, Rfl: 3  •  metFORMIN ER (GLUCOPHAGE XR) 500 MG TABLET SR 24 HR, Take 1 Tab by mouth every bedtime., Disp: 90 Tab, Rfl: 3  •  RESTASIS 0.05 % ophthalmic emulsion, Place 1 Drop in both eyes 2 times a day., Disp: , Rfl:   •  doxepin (SINEQUAN) 25 MG Cap, Take 25 mg by mouth every day., Disp: , Rfl:   •  diltiazem (CARDIZEM) 60 MG Tab, TAKE ONE TABLET BY MOUTH EVERY 12 HOURS, Disp: 180 Tab, Rfl: 3  •  Cetirizine HCl (ZYRTEC PO), Take 1 Each by mouth every day., Disp: , Rfl:   •  pantoprazole (PROTONIX) 40 MG TBEC, Take 40 mg by mouth 2 times a day.  , Disp: , Rfl:   •  ALPRAZolam (XANAX) 0.5 MG Tab, TAKE ONE TABLET BY MOUTH DAILY AS NEEDED FOR UP TO 30 DAYS., Disp: 30 Tab, Rfl: 3  •  hydrOXYzine (ATARAX) 10 MG Tab, , Disp: , Rfl:   •  montelukast (SINGULAIR) 10 MG TABS, Take 10 mg by mouth every day.  , Disp: , Rfl:   •  Acetaminophen (TYLENOL PO), Take  by mouth. EXTRA STRENGH 2 TABS PRN , Disp: , Rfl:   •  Albuterol Sulfate (VENTOLIN HFA INH), Inhale 1 Puff by mouth as needed., Disp: , Rfl:   •  fluticasone (FLONASE) 50 MCG/ACT nasal spray, Spray 1 Spray in nose 2 times a day. Each Nostril , Disp: , Rfl:   ALLERGIES:   Allergies   Allergen Reactions   • Ultram [Tramadol Hcl]      ELEVATED LIVER ENZYMES     SURGHX:   Past Surgical History:   Procedure Laterality Date   • SEPTOPLASTY  3/12/2012    Performed by MEHDI SWAIN at SURGERY SAME DAY [facility] ORS   • TURBINOPLASTY  3/12/2012    Performed by MEHDI SWAIN at SURGERY SAME DAY [facility] ORS   • SINUSOTOMIES  3/12/2012    Performed by MEHDI SWAIN at SURGERY SAME DAY [facility] ORS   • TRIGGER FINGER RELEASE  11/23/2009    Performed by MEHDI ONEIL at SURGERY [facility] ORS   • CARPAL TUNNEL RELEASE  2009 b/L   • KNEE ARTHROPLASTY TOTAL  4/8/08    Performed by ROHAN BLACKWELL at  SURGERY [facility] ORS   • KNEE ARTHROPLASTY TOTAL  4/8/08    Performed by ROHAN BLACKWELL at SURGERY [facility] ORS   • CATARACT EXTRACTION WITH IOL  2008    OU   • ROTATOR CUFF REPAIR  2005    left   • TRIGGER FINGER RELEASE  2005    left   • CERVICAL CONIZATION      Loop Electrode Excision   • HAND SURGERY     • PRIMARY C SECTION       SOCHX:  reports that she has never smoked. She has never used smokeless tobacco. She reports that she drinks about 0.6 oz of alcohol per week . She reports that she does not use drugs.  FH: family history includes Heart Attack (age of onset: 66) in her father; Other in her father and mother; Stroke in her mother.       Assessment/Plan:     1. Acute bronchitis, unspecified organism  - ipratropium-albuterol (DUONEB) nebulizer solution; 3 mL by Nebulization route Once.   - Given in urgent care with significant relief of wheezing, pulse ox improved to 100% after breathing treatment  - MethylPREDNISolone (MEDROL DOSEPAK) 4 MG Tablet Therapy Pack; Take as directed  Dispense: 1 Kit; Refill: 0  - benzonatate (TESSALON) 100 MG Cap; Take 1-2 Caps by mouth 3 times a day as needed.  Dispense: 60 Cap; Refill: 0    Advised patient symptoms are most likely viral in etiology, recommend supportive care. Increased fluids and rest. Tessalon perles and albuterol inhaler as needed for symptomatic relief. Call or return to office if symptoms persist or worsen. The patient demonstrated a good understanding and agreed with the treatment plan.

## 2019-01-09 ENCOUNTER — OFFICE VISIT (OUTPATIENT)
Dept: URGENT CARE | Facility: PHYSICIAN GROUP | Age: 74
End: 2019-01-09
Payer: MEDICARE

## 2019-01-09 VITALS
OXYGEN SATURATION: 94 % | DIASTOLIC BLOOD PRESSURE: 68 MMHG | TEMPERATURE: 97.5 F | HEART RATE: 100 BPM | SYSTOLIC BLOOD PRESSURE: 120 MMHG | RESPIRATION RATE: 18 BRPM | WEIGHT: 210 LBS | HEIGHT: 66 IN | BODY MASS INDEX: 33.75 KG/M2

## 2019-01-09 DIAGNOSIS — J22 LRTI (LOWER RESPIRATORY TRACT INFECTION): ICD-10-CM

## 2019-01-09 PROCEDURE — 99214 OFFICE O/P EST MOD 30 MIN: CPT | Performed by: FAMILY MEDICINE

## 2019-01-09 RX ORDER — AZITHROMYCIN 250 MG/1
TABLET, FILM COATED ORAL
Qty: 6 TAB | Refills: 0 | Status: SHIPPED | OUTPATIENT
Start: 2019-01-09 | End: 2019-01-23

## 2019-01-09 NOTE — PROGRESS NOTES
Chief Complaint   Patient presents with   • Cough     x 9 days             Cough  This is a new problem. The current episode started 10 d ago. The problem has been gradually worsening. The problem occurs constantly. The cough is productive of sputum. Associated symptoms include ear congestion, ear pain and nasal congestion. Pertinent negatives include no fever, headaches, sweats, weight loss or wheezing. Nothing aggravates the symptoms.  Patient has tried albuterol and medrol for the symptoms at home - minimal relief. There is a history of asthma.     Social History   Substance Use Topics   • Smoking status: Never Smoker   • Smokeless tobacco: Never Used   • Alcohol use 0.6 oz/week     1 Glasses of wine per week      Comment: rarely          Current Outpatient Prescriptions on File Prior to Visit   Medication Sig Dispense Refill   • MethylPREDNISolone (MEDROL DOSEPAK) 4 MG Tablet Therapy Pack Take as directed 1 Kit 0   • benzonatate (TESSALON) 100 MG Cap Take 1-2 Caps by mouth 3 times a day as needed. 60 Cap 0   • montelukast (SINGULAIR) 10 MG Tab TAKE 1 TABLET EVERY EVENING 90 Tab 0   • SYMBICORT 160-4.5 MCG/ACT Aerosol USE 2 INHALATIONS ORALLY   TWICE DAILY WITH SPACER,   RINSE MOUTH AFTER EACH USE. 3 Inhaler 3   • atorvastatin (LIPITOR) 20 MG Tab Take 1 Tab by mouth every bedtime. 90 Tab 3   • lisinopril-hydrochlorothiazide (PRINZIDE, ZESTORETIC) 20-12.5 MG per tablet Take 1 Tab by mouth every morning. 90 Tab 3   • raNITidine (ZANTAC) 150 MG Tab Take 1 Tab by mouth every day. 90 Tab 3   • metFORMIN ER (GLUCOPHAGE XR) 500 MG TABLET SR 24 HR Take 1 Tab by mouth every bedtime. 90 Tab 3   • RESTASIS 0.05 % ophthalmic emulsion Place 1 Drop in both eyes 2 times a day.     • doxepin (SINEQUAN) 25 MG Cap Take 25 mg by mouth every day.     • diltiazem (CARDIZEM) 60 MG Tab TAKE ONE TABLET BY MOUTH EVERY 12 HOURS 180 Tab 3   • Cetirizine HCl (ZYRTEC PO) Take 1 Each by mouth every day.     • pantoprazole (PROTONIX) 40 MG  "TBEC Take 40 mg by mouth 2 times a day.       • montelukast (SINGULAIR) 10 MG TABS Take 10 mg by mouth every day.       • Acetaminophen (TYLENOL PO) Take  by mouth. EXTRA STRENGH 2 TABS PRN      • Albuterol Sulfate (VENTOLIN HFA INH) Inhale 1 Puff by mouth as needed.     • fluticasone (FLONASE) 50 MCG/ACT nasal spray Spray 1 Spray in nose 2 times a day. Each Nostril      • ALPRAZolam (XANAX) 0.5 MG Tab TAKE ONE TABLET BY MOUTH DAILY AS NEEDED FOR UP TO 30 DAYS. 30 Tab 3   • DILTIAZem (CARDIZEM) 60 MG Tab TAKE 1 TABLET EVERY 12     HOURS 180 Tab 3   • hydrOXYzine (ATARAX) 10 MG Tab        No current facility-administered medications on file prior to visit.                 Review of Systems   Constitutional: Negative for fever and weight loss.   HENT: negative for ear pain.    Cardiovascular - denies chest pain or dyspnea  Respiratory: Positive for cough.  Cough is productive.  Negative for wheezing.    Neurological: Negative for headaches.   GI - denies nausea, vomiting or diarrhea  Neuro - denies numbness or tingling.            Objective:     Blood pressure 120/68, pulse 100, temperature 36.4 °C (97.5 °F), temperature source Temporal, resp. rate 18, height 1.676 m (5' 6\"), weight 95.3 kg (210 lb), SpO2 94 %, not currently breastfeeding.    Physical Exam   Constitutional: patient is oriented to person, place, and time. Patient appears well-developed and well-nourished. No distress.   HENT:   Head: Normocephalic and atraumatic.   Right Ear: External ear normal.   Left Ear: External ear normal.   Nose: Mucosal edema present. Right sinus exhibits no maxillary sinus tenderness. Left sinus exhibits no maxillary sinus tenderness.   Mouth/Throat: Mucous membranes are normal. No oral lesions. Posterior oropharyngeal erythema present. No oropharyngeal exudate or posterior oropharyngeal edema.   Eyes: Conjunctivae and EOM are normal. Pupils are equal, round, and reactive to light. Right eye exhibits no discharge. Left eye " exhibits no discharge. No scleral icterus.   Neck: Normal range of motion. Neck supple. No tracheal deviation present.   Cardiovascular: Normal rate, regular rhythm and normal heart sounds.  Exam reveals no friction rub.    Pulmonary/Chest: Effort normal. No respiratory distress. Patient has no wheezes or rhonchi Patient has no rales.    Musculoskeletal:  exhibits no edema.   Lymphadenopathy:     Patient has no cervical adenopathy  Neurological: patient is alert and oriented to person, place, and time.   Skin: Skin is warm and dry. No rash noted. No erythema.   Psychiatric: patient  has a normal mood and affect.  behavior is normal.   Nursing note and vitals reviewed.              Assessment/Plan:     1. LRTI (lower respiratory tract infection)     - azithromycin (ZITHROMAX) 250 MG Tab; Take as directed  Dispense: 6 Tab; Refill: 0    Follow up in one week if no improvement, sooner if symptoms worsen.

## 2019-01-23 ENCOUNTER — OFFICE VISIT (OUTPATIENT)
Dept: PULMONOLOGY | Facility: HOSPICE | Age: 74
End: 2019-01-23
Payer: MEDICARE

## 2019-01-23 VITALS
BODY MASS INDEX: 34.39 KG/M2 | HEART RATE: 118 BPM | OXYGEN SATURATION: 92 % | TEMPERATURE: 97.5 F | DIASTOLIC BLOOD PRESSURE: 72 MMHG | SYSTOLIC BLOOD PRESSURE: 138 MMHG | WEIGHT: 214 LBS | RESPIRATION RATE: 16 BRPM | HEIGHT: 66 IN

## 2019-01-23 DIAGNOSIS — R06.02 SHORTNESS OF BREATH: ICD-10-CM

## 2019-01-23 DIAGNOSIS — G47.33 OSA (OBSTRUCTIVE SLEEP APNEA): Chronic | ICD-10-CM

## 2019-01-23 DIAGNOSIS — Z91.09 ENVIRONMENTAL ALLERGIES: ICD-10-CM

## 2019-01-23 DIAGNOSIS — J45.30 MILD PERSISTENT ASTHMA WITHOUT COMPLICATION: Chronic | ICD-10-CM

## 2019-01-23 DIAGNOSIS — Z23 NEED FOR VACCINATION: ICD-10-CM

## 2019-01-23 DIAGNOSIS — J30.9 ALLERGIC RHINITIS, UNSPECIFIED SEASONALITY, UNSPECIFIED TRIGGER: ICD-10-CM

## 2019-01-23 DIAGNOSIS — Z78.9 NONSMOKER: ICD-10-CM

## 2019-01-23 DIAGNOSIS — R00.0 TACHYCARDIA: ICD-10-CM

## 2019-01-23 DIAGNOSIS — I10 BENIGN ESSENTIAL HYPERTENSION: Chronic | ICD-10-CM

## 2019-01-23 DIAGNOSIS — E66.9 OBESITY (BMI 30-39.9): ICD-10-CM

## 2019-01-23 PROCEDURE — 99214 OFFICE O/P EST MOD 30 MIN: CPT | Mod: 25 | Performed by: NURSE PRACTITIONER

## 2019-01-23 PROCEDURE — 90732 PPSV23 VACC 2 YRS+ SUBQ/IM: CPT | Performed by: NURSE PRACTITIONER

## 2019-01-23 PROCEDURE — G0009 ADMIN PNEUMOCOCCAL VACCINE: HCPCS | Performed by: NURSE PRACTITIONER

## 2019-01-23 RX ORDER — MONTELUKAST SODIUM 10 MG/1
10 TABLET ORAL DAILY
Qty: 90 TAB | Refills: 3 | Status: SHIPPED | OUTPATIENT
Start: 2019-01-23 | End: 2020-02-18

## 2019-01-23 RX ORDER — MELOXICAM 15 MG/1
15 TABLET ORAL DAILY
COMMUNITY
Start: 2019-01-15

## 2019-01-23 NOTE — PROGRESS NOTES
Chief Complaint   Patient presents with   • Apnea     CPAP 7cm H20       HPI:  Judy Bland is a 73 y.o. year old female here today for follow-up on BACILIO and asthma. She is followed by Dr. Quiroga for allergies, Cardio Dr. Rasheed for HTN, tachycardia, murmur and dyslipidemia. Hx of Hep B from needle stick.    Retired RN. Chronic tachycardia followed by Cardio, pending appt. Prior treadmill stress test negative for ischemia but deconditioning noted and hypertensive rxn to exercise.     Prior PSG at Dr. Andrade's office 2011 indicated severe sleep apnea with an overall AHI of 38 and a minimum saturation of 67%.  She is on CPAP 7cm H20.  She updated her device in 2017.  She is currently having difficulty with her mask.  She notes having to at home I would like to have a mask [facility] with [facility].  She notes not feeling overly rested when she wakes.  She notes low energy and daytime fatigue.  She would naps maybe 1 time every 2 weeks.  She denies morning headaches.     She is compliant with Symbicort 160/4.5mcg 2 puffs BID, Singulair qhs, Flonase prn and Ventolin HFA inhaler prn which she rarely uses.  She continues her allergy shot injections.  PFT 12/18/14 indicated FVC 2.05L or 62%, FEV1 1.71L or 68%, FEV1/FVC ratio 84 and DLCO 120% predicted. Repeat PFT 2017 indicated normal lung function on current therapy with FEV1 2.49 L or 103% predicted, FEV1/FVC ratio 84, no hyperinflation, TLC 94% predicted, DLCO 124% predicted. Patient did have mild bronchodilator response.  Since her last office visit she feels she has become more short of breath but notes being deconditioned.  BMI 34.  She denies dyspnea at rest.  She does have stairs in her home and becomes winded.  She denies regular cough, phlegm, chest tightness or wheezing.  She recently had a case of bronchitis and treated with Medrol and Z-Gerard with no improvement but symptoms resolved after 3 weeks.  She no longer has pets in her home.  She did try Brio 1  puff daily but not feel it is any more beneficial than Symbicort.  GERD remains well controlled on Protonix daily.         ROS: As per HPI and otherwise negative if not stated.    Past Medical History:   Diagnosis Date   • Acquired polycythemia 2/15/2012   • Anesthesia     PONV   • Arthritis    • Arthropathy 2/15/2012   • Benign essential hypertension 2/15/2012   • Chronic hepatitis B (HCC) 2/15/2012   • Cough 2/15/2012   • Difficulty breathing 2/15/2012   • Esophageal reflux 2/15/2012   • Heart burn    • Hepatitis B    • Hypercholesterolemia 2/15/2012   • Hyperlipidemia 2/15/2012   • Hypertension    • Indigestion    • Jaundice    • Murmur 2/15/2012   • Obesity 2/15/2012   • BACILIO (obstructive sleep apnea) 2/15/2012   • Sinus tachycardia 2/15/2012       Past Surgical History:   Procedure Laterality Date   • SEPTOPLASTY  3/12/2012    Performed by MEHDI SWAIN at SURGERY SAME DAY [facility] ORS   • TURBINOPLASTY  3/12/2012    Performed by MEHDI SWAIN at SURGERY SAME DAY [facility] ORS   • SINUSOTOMIES  3/12/2012    Performed by MEHDI SWAIN at SURGERY SAME DAY [facility] ORS   • TRIGGER FINGER RELEASE  11/23/2009    Performed by MEHDI ONEIL at SURGERY [facility] ORS   • CARPAL TUNNEL RELEASE  2009    b/L   • KNEE ARTHROPLASTY TOTAL  4/8/08    Performed by ROHAN BLACKWELL at SURGERY [facility] ORS   • KNEE ARTHROPLASTY TOTAL  4/8/08    Performed by ROHAN BLACKWELL at SURGERY [facility] ORS   • CATARACT EXTRACTION WITH IOL  2008    OU   • ROTATOR CUFF REPAIR  2005    left   • TRIGGER FINGER RELEASE  2005    left   • CERVICAL CONIZATION      Loop Electrode Excision   • HAND SURGERY     • PRIMARY C SECTION         Family History   Problem Relation Age of Onset   • Other Mother         Family hx of Coronary Arteriosclerosis   • Stroke Mother    • Other Father         Family hx of Coronary Arteriosclerosis   • Heart Attack Father 66       Social History     Social History   • Marital status:      Spouse name:  "N/A   • Number of children: N/A   • Years of education: N/A     Occupational History   • Not on file.     Social History Main Topics   • Smoking status: Never Smoker   • Smokeless tobacco: Never Used   • Alcohol use 0.6 oz/week     1 Glasses of wine per week      Comment: rarely    • Drug use: No   • Sexual activity: Not Currently     Other Topics Concern   • Not on file     Social History Narrative   • No narrative on file       Allergies as of 01/23/2019 - Reviewed 01/23/2019   Allergen Reaction Noted   • Ultram [tramadol hcl]  02/09/2011        @Vital signs for this encounter:  Vitals:    01/23/19 0754   Height: 1.676 m (5' 6\")   Weight: 97.1 kg (214 lb)   Weight % change since last entry.: 0 %   BP: 138/72   Pulse: (!) 118   BMI (Calculated): 34.54   Resp: 16   Temp: 36.4 °C (97.5 °F)   TempSrc: Temporal   O2 sat % room air: 92 %       Current medications as of today   Current Outpatient Prescriptions   Medication Sig Dispense Refill   • meloxicam (MOBIC) 15 MG tablet      • ALPRAZolam (XANAX) 0.5 MG Tab TAKE ONE TABLET BY MOUTH DAILY AS NEEDED FOR UP TO 30 DAYS. 30 Tab 3   • montelukast (SINGULAIR) 10 MG Tab TAKE 1 TABLET EVERY EVENING 90 Tab 0   • SYMBICORT 160-4.5 MCG/ACT Aerosol USE 2 INHALATIONS ORALLY   TWICE DAILY WITH SPACER,   RINSE MOUTH AFTER EACH USE. 3 Inhaler 3   • atorvastatin (LIPITOR) 20 MG Tab Take 1 Tab by mouth every bedtime. 90 Tab 3   • lisinopril-hydrochlorothiazide (PRINZIDE, ZESTORETIC) 20-12.5 MG per tablet Take 1 Tab by mouth every morning. 90 Tab 3   • raNITidine (ZANTAC) 150 MG Tab Take 1 Tab by mouth every day. 90 Tab 3   • metFORMIN ER (GLUCOPHAGE XR) 500 MG TABLET SR 24 HR Take 1 Tab by mouth every bedtime. 90 Tab 3   • RESTASIS 0.05 % ophthalmic emulsion Place 1 Drop in both eyes 2 times a day.     • doxepin (SINEQUAN) 25 MG Cap Take 25 mg by mouth every day.     • diltiazem (CARDIZEM) 60 MG Tab TAKE ONE TABLET BY MOUTH EVERY 12 HOURS 180 Tab 3   • pantoprazole (PROTONIX) 40 MG " TBEC Take 40 mg by mouth 2 times a day.       • fluticasone (FLONASE) 50 MCG/ACT nasal spray Spray 1 Spray in nose 2 times a day. Each Nostril      • benzonatate (TESSALON) 100 MG Cap Take 1-2 Caps by mouth 3 times a day as needed. 60 Cap 0   • Cetirizine HCl (ZYRTEC PO) Take 1 Each by mouth every day.     • Acetaminophen (TYLENOL PO) Take  by mouth. EXTRA STRENGH 2 TABS PRN      • Albuterol Sulfate (VENTOLIN HFA INH) Inhale 1 Puff by mouth as needed.       No current facility-administered medications for this visit.          Physical Exam:   Gen:           Alert and oriented, No apparent distress. Mood and affect appropriate, normal interaction with examiner.  Eyes:          PERRL, EOM intact, sclere white, conjunctive moist.  Ears:          Not examined.   Hearing:     Grossly intact.  Nose:          Normal, no lesions or deformities.  Dentition:    Good dentition.  Oropharynx:   Tongue normal, posterior pharynx without erythema or exudate.  Mallampati Classification: 3  Neck:        Supple, trachea midline, no masses.  Respiratory Effort: No intercostal retractions or use of accessory muscles.   Lung Auscultation:      Clear to auscultation bilaterally; no rales, rhonchi or wheezing.  CV:            Regular rate and rhythm. No murmurs, rubs or gallops.  Abd:           Not examined.   Lymphadenopathy: Not examined.  Gait and Station: Normal.  Digits and Nails: No clubbing, cyanosis, petechiae, or nodes.   Cranial Nerves: II-XII grossly intact.  Skin:        No rashes, lesions or ulcers noted.               Ext:           No cyanosis or edema.      Assessment:  1. Mild persistent asthma without complication     2. BACILIO (obstructive sleep apnea)     3. Benign essential hypertension     4. Obesity (BMI 30-39.9)  Height And Weight   5. Tachycardia     6. Nonsmoker     7. Allergic rhinitis, unspecified seasonality, unspecified trigger     8. Environmental allergies         Immunizations:    Flu:10/2018  Pneumovax  23:given today  Prevnar 13:2017    Plan: Patient notes symptoms of dyspnea worsening. She is deconditioned and overweight.  1.  Pneumovax 23 given today.  2.  Continue inhaler regimen.  Rx refill for Singulair nightly.  3.  Encourage weight loss through gentle exercise and dietary changes.  4.  Discussed sleep and respiratory hygiene.  5.  Continue CPAP nightly.  DME mask/supplies with mask fit.  DME CNOX on Pap now.  Advised patient to call for results.  6.  Follow-up in 6 weeks at pulmonary clinic with PFT/CNOX results, sooner if needed.  Follow-up at sleep center in 1 year with compliance card, sooner if needed.    Please note that this dictation was created using voice recognition software. I have made every reasonable attempt to correct obvious errors, but it is possible there are errors of grammar and possibly content that I did not discover before finalizing the note.

## 2019-02-07 ENCOUNTER — TELEPHONE (OUTPATIENT)
Dept: PULMONOLOGY | Facility: HOSPICE | Age: 74
End: 2019-02-07

## 2019-02-07 NOTE — TELEPHONE ENCOUNTER
Patient walked in clinic this morning. She is inquiring if she needs orders for a mask fitting at [facility]. She is having a tubing issue and the bridge of her nose is red for hours after she removes the mask.    Patient call back number: [phone]

## 2019-02-08 NOTE — TELEPHONE ENCOUNTER
Patient was not calling for mask fitting at [facility]. She has a bunch of masks but cannot figure out how to put them on, all she wanted is if she could go to [facility] so they could teach her, I advised that of course she could and would not need a prescription for that. I apologized for the miscommunication.

## 2019-03-15 ENCOUNTER — NON-PROVIDER VISIT (OUTPATIENT)
Dept: PULMONOLOGY | Facility: HOSPICE | Age: 74
End: 2019-03-15
Attending: NURSE PRACTITIONER
Payer: MEDICARE

## 2019-03-15 ENCOUNTER — OFFICE VISIT (OUTPATIENT)
Dept: PULMONOLOGY | Facility: HOSPICE | Age: 74
End: 2019-03-15
Payer: MEDICARE

## 2019-03-15 VITALS
OXYGEN SATURATION: 98 % | TEMPERATURE: 98.2 F | HEART RATE: 80 BPM | HEIGHT: 66 IN | DIASTOLIC BLOOD PRESSURE: 60 MMHG | BODY MASS INDEX: 34.72 KG/M2 | WEIGHT: 216 LBS | SYSTOLIC BLOOD PRESSURE: 132 MMHG | RESPIRATION RATE: 16 BRPM

## 2019-03-15 VITALS — BODY MASS INDEX: 34.86 KG/M2 | WEIGHT: 216 LBS

## 2019-03-15 DIAGNOSIS — Z78.9 NONSMOKER: ICD-10-CM

## 2019-03-15 DIAGNOSIS — J45.30 MILD PERSISTENT ASTHMA WITHOUT COMPLICATION: Chronic | ICD-10-CM

## 2019-03-15 DIAGNOSIS — G47.33 OSA (OBSTRUCTIVE SLEEP APNEA): Chronic | ICD-10-CM

## 2019-03-15 DIAGNOSIS — R06.02 SHORTNESS OF BREATH: ICD-10-CM

## 2019-03-15 DIAGNOSIS — Z91.09 ENVIRONMENTAL ALLERGIES: ICD-10-CM

## 2019-03-15 DIAGNOSIS — I10 BENIGN ESSENTIAL HYPERTENSION: Chronic | ICD-10-CM

## 2019-03-15 DIAGNOSIS — E66.9 OBESITY (BMI 30-39.9): ICD-10-CM

## 2019-03-15 PROCEDURE — 94726 PLETHYSMOGRAPHY LUNG VOLUMES: CPT | Performed by: INTERNAL MEDICINE

## 2019-03-15 PROCEDURE — 99214 OFFICE O/P EST MOD 30 MIN: CPT | Performed by: NURSE PRACTITIONER

## 2019-03-15 PROCEDURE — 94729 DIFFUSING CAPACITY: CPT | Performed by: INTERNAL MEDICINE

## 2019-03-15 PROCEDURE — 94060 EVALUATION OF WHEEZING: CPT | Performed by: INTERNAL MEDICINE

## 2019-03-15 RX ORDER — ALPRAZOLAM 0.5 MG/1
TABLET ORAL
COMMUNITY
Start: 2019-03-15 | End: 2019-06-17 | Stop reason: SDUPTHER

## 2019-03-15 ASSESSMENT — PULMONARY FUNCTION TESTS
FEV1/FVC: 83
FEV1_PERCENT_PREDICTED: 94
FEV1/FVC_PERCENT_PREDICTED: 111
FEV1_LLN: 1.96
FEV1: 2.22
FEV1/FVC_PERCENT_CHANGE: 5
FEV1/FVC_PERCENT_LLN: 63
FEV1/FVC_PERCENT_PREDICTED: 110
FEV1_PERCENT_CHANGE: -2
FVC_LLN: 2.61
FEV1/FVC_PREDICTED: 75
FEV1/FVC_PERCENT_PREDICTED: 117
FEV1/FVC_PERCENT_CHANGE: 29
FEV1/FVC: 88
FEV1_PREDICTED: 2.35
FEV1: 2.17
FVC_PREDICTED: 3.12
FEV1_PERCENT_CHANGE: -7
FEV1/FVC: 83
FEV1_PERCENT_PREDICTED: 92
FEV1/FVC_PERCENT_PREDICTED: 116
FEV1/FVC: 87.85
FVC: 2.67
FVC: 2.47
FVC_PERCENT_PREDICTED: 79
FEV1/FVC_PERCENT_PREDICTED: 75
FVC_PERCENT_PREDICTED: 85

## 2019-03-15 NOTE — PROCEDURES
Technician: JEFF Call    Technician Comment:  Good patient effort & cooperation.  The results of this test meet the ATS/ERS standards for acceptability & reproducibility.  Test was performed on the [employer] Body Plethysmograph-Elite DX system.  Predicted values were [facility]-3 for spirometry, [facility] for DLCO, ITS for Lung Volumes.  The DLCO was uncorrected for Hgb.  A bronchodilator of Ventolin HFA -2puffs via spacer administered.  DLCO performed during dilation period.    1. Baseline spirometry demonstrates a normal  FEV1 and FVC. FEV1/FVC ratio is reduced.    2. After administration of an inhaled bronchodilator there is no improvement in FEV1.    3. Lung volumes demonstrate a total lung capacity of 84% of predicted.    4. Gas exchange as estimated by DLCO is normal at 110% of predicted.    5. Airway resistance is normal.      Impression:    No definite evidence of significant obstructive or restrictive pulmonary disease is noted on the study.

## 2019-03-15 NOTE — PROGRESS NOTES
Chief Complaint   Patient presents with   • Asthma     PFT/ OPO        HPI:  Judy Bland is a 73 y.o. year old female here today for follow-up on asthma. Hx of BACILIO.    PATIENT ALSO SLEEP PATIENT; SEE DICTATION 1/23/19 FOR HISTORY.     She is followed by Dr. Quiroga for allergies, Cardio Dr. Rasheed for HTN, tachycardia, murmur and dyslipidemia. Hx of Hep B from needle stick.     Retired RN. Chronic tachycardia followed by Cardio, pending appt. Prior treadmill stress test negative for ischemia but deconditioning noted and hypertensive rxn to exercise.     CNOX completed 2/12/19 indicated 327.8 minutes less than 80% oxygen saturation.  ANDREA 30. Currently using CPAP 7cm. She declines in lab titration study. Will adjust pressures and repeat CNOX. Continues to be fatigued.     She is compliant with Symbicort 160/4.5mcg 2 puffs BID, Singulair qhs, zyrtec QD, Flonase prn and Ventolin HFA inhaler prn which she rarely uses.  She continues her allergy shot injections.  She continues to have dyspnea with exertion. No significant cough/phlegm/chest tightness/wheezing.  GERD is controlled on medication.  She notes being out of breath walking longer distances, quit paces, inclines or stairs.  She would like to lose weight.  She does not currently exercise routinely.  Last echocardiogram was in 2015 indicating EF 65-70%, RVSP 24-29 mmHg.  PFT 12/18/14 indicated FVC 2.05L or 62%, FEV1 1.71L or 68%, FEV1/FVC ratio 84 and DLCO 120% predicted.  Updated PFT 3/15/2019 indicates normal spirometry with FEV1 2.22 L or 94%, FEV1/FVC ratio 83%, TLC 84% and a DLCO 110% predicted.  She did try Breo 1 puff daily but not feel it is any more beneficial than Symbicort.   BMI 34.      ROS: As per HPI and otherwise negative if not stated.    Past Medical History:   Diagnosis Date   • Acquired polycythemia 2/15/2012   • Anesthesia     PONV   • Arthritis    • Arthropathy 2/15/2012   • Benign essential hypertension 2/15/2012   • Chronic  hepatitis B (HCC) 2/15/2012   • Cough 2/15/2012   • Difficulty breathing 2/15/2012   • Esophageal reflux 2/15/2012   • Heart burn    • Hepatitis B    • Hypercholesterolemia 2/15/2012   • Hyperlipidemia 2/15/2012   • Hypertension    • Indigestion    • Jaundice    • Murmur 2/15/2012   • Obesity 2/15/2012   • BACILIO (obstructive sleep apnea) 2/15/2012   • Sinus tachycardia 2/15/2012       Past Surgical History:   Procedure Laterality Date   • SEPTOPLASTY  3/12/2012    Performed by MEHDI SWAIN at SURGERY SAME DAY [facility] ORS   • TURBINOPLASTY  3/12/2012    Performed by MEHDI SWAIN at SURGERY SAME DAY [facility] ORS   • SINUSOTOMIES  3/12/2012    Performed by MEHDI SWAIN at SURGERY SAME DAY [facility] ORS   • TRIGGER FINGER RELEASE  11/23/2009    Performed by MEHDI ONEIL at SURGERY [facility] ORS   • CARPAL TUNNEL RELEASE  2009    b/L   • KNEE ARTHROPLASTY TOTAL  4/8/08    Performed by ROHAN BLACKWELL at SURGERY [facility] ORS   • KNEE ARTHROPLASTY TOTAL  4/8/08    Performed by ROHAN BLACKWELL at SURGERY [facility] ORS   • CATARACT EXTRACTION WITH IOL  2008    OU   • ROTATOR CUFF REPAIR  2005    left   • TRIGGER FINGER RELEASE  2005    left   • CERVICAL CONIZATION      Loop Electrode Excision   • HAND SURGERY     • PRIMARY C SECTION         Family History   Problem Relation Age of Onset   • Other Mother         Family hx of Coronary Arteriosclerosis   • Stroke Mother    • Other Father         Family hx of Coronary Arteriosclerosis   • Heart Attack Father 66       Social History     Social History   • Marital status:      Spouse name: N/A   • Number of children: N/A   • Years of education: N/A     Occupational History   • Not on file.     Social History Main Topics   • Smoking status: Never Smoker   • Smokeless tobacco: Never Used   • Alcohol use 0.6 oz/week     1 Glasses of wine per week      Comment: rarely    • Drug use: No   • Sexual activity: Not Currently     Other Topics Concern   • Not on file  "    Social History Narrative   • No narrative on file       Allergies as of 03/15/2019 - Reviewed 03/15/2019   Allergen Reaction Noted   • Ultram [tramadol hcl]  02/09/2011        @Vital signs for this encounter:  Vitals:    03/15/19 1053 03/15/19 1054   Height: 1.676 m (5' 6\")    Weight: 98 kg (216 lb)    Weight % change since last entry.: 0 %    BP: 132/60    Pulse: 80    BMI (Calculated): 34.86    Resp: 16    Temp: 36.8 °C (98.2 °F)    TempSrc: Temporal    O2 sat % room air:  98 %       Current medications as of today   Current Outpatient Prescriptions   Medication Sig Dispense Refill   • meloxicam (MOBIC) 15 MG tablet      • montelukast (SINGULAIR) 10 MG Tab Take 1 Tab by mouth every day. 90 Tab 3   • SYMBICORT 160-4.5 MCG/ACT Aerosol USE 2 INHALATIONS ORALLY   TWICE DAILY WITH SPACER,   RINSE MOUTH AFTER EACH USE. 3 Inhaler 3   • atorvastatin (LIPITOR) 20 MG Tab Take 1 Tab by mouth every bedtime. 90 Tab 3   • lisinopril-hydrochlorothiazide (PRINZIDE, ZESTORETIC) 20-12.5 MG per tablet Take 1 Tab by mouth every morning. 90 Tab 3   • raNITidine (ZANTAC) 150 MG Tab Take 1 Tab by mouth every day. 90 Tab 3   • metFORMIN ER (GLUCOPHAGE XR) 500 MG TABLET SR 24 HR Take 1 Tab by mouth every bedtime. 90 Tab 3   • RESTASIS 0.05 % ophthalmic emulsion Place 1 Drop in both eyes 2 times a day.     • doxepin (SINEQUAN) 25 MG Cap Take 25 mg by mouth every day.     • diltiazem (CARDIZEM) 60 MG Tab TAKE ONE TABLET BY MOUTH EVERY 12 HOURS 180 Tab 3   • Cetirizine HCl (ZYRTEC PO) Take 1 Each by mouth every day.     • pantoprazole (PROTONIX) 40 MG TBEC Take 40 mg by mouth 2 times a day.       • fluticasone (FLONASE) 50 MCG/ACT nasal spray Spray 1 Spray in nose 2 times a day. Each Nostril      • ALPRAZolam (XANAX) 0.5 MG Tab      • Acetaminophen (TYLENOL PO) Take  by mouth. EXTRA STRENGH 2 TABS PRN      • Albuterol Sulfate (VENTOLIN HFA INH) Inhale 1 Puff by mouth as needed.       No current facility-administered medications for this " visit.          Physical Exam:   Gen:           Alert and oriented, No apparent distress. Mood and affect appropriate, normal interaction with examiner.  Eyes:          PERRL, EOM intact, sclere white, conjunctive moist.  Ears:          Not examined.   Hearing:     Grossly intact.  Nose:          Normal, no lesions or deformities.  Dentition:    Good dentition.  Oropharynx:   Tongue normal, posterior pharynx without erythema or exudate.  Mallampati Classification: 3  Neck:        Supple, trachea midline, no masses.  Respiratory Effort: No intercostal retractions or use of accessory muscles.   Lung Auscultation:      Clear to auscultation bilaterally; no rales, rhonchi or wheezing.  CV:            Regular rate and rhythm. No murmurs, rubs or gallops.  Abd:           Not examined.   Lymphadenopathy: No palpable nodes or edema.  Gait and Station: Normal.  Digits and Nails: No clubbing, cyanosis, petechiae, or nodes.   Cranial Nerves: II-XII grossly intact.  Skin:        No rashes, lesions or ulcers noted.               Ext:           No cyanosis or edema.      Assessment:  1. Mild persistent asthma without complication     2. BACILIO (obstructive sleep apnea)     3. Benign essential hypertension     4. Environmental allergies     5. Obesity (BMI 30-39.9)  Height And Weight   6. Nonsmoker         Immunizations:    Flu:10/2018  Pneumovax 23:1/23/19  Prevnar 13:2017    Plan:  1.  Continue inhaler regimen.  2.  We will optimize her sleep apnea therapy.  DME order to adjust CPAP pressures to auto 9-15cm nightly.  DME CNOX on new settings 4 weeks after adjustment.  May call results to patient.  Advised her to call office for these. She would like to avoid in lab titration.  3.  Discussed sleep nursery hygiene.  4.  Encourage weight loss with dietary changes and regular exercise.  She may benefit from stationary bike which she has at home.  5.  Follow-up with cardiology as scheduled.  6.  Follow-up in 6 months at pulmonary clinic  to check symptoms, sooner if needed.  Patient is due for sleep apnea follow-up January 2019.    Please note that this dictation was created using voice recognition software. I have made every reasonable attempt to correct obvious errors, but it is possible there are errors of grammar and possibly content that I did not discover before finalizing the note.

## 2019-03-18 DIAGNOSIS — D75.1 ACQUIRED POLYCYTHEMIA: Chronic | ICD-10-CM

## 2019-03-18 DIAGNOSIS — G47.33 OSA (OBSTRUCTIVE SLEEP APNEA): Chronic | ICD-10-CM

## 2019-03-18 DIAGNOSIS — I10 BENIGN ESSENTIAL HYPERTENSION: Chronic | ICD-10-CM

## 2019-03-18 DIAGNOSIS — R01.1 MURMUR: Chronic | ICD-10-CM

## 2019-03-18 DIAGNOSIS — E78.00 HYPERCHOLESTEROLEMIA: Chronic | ICD-10-CM

## 2019-03-18 DIAGNOSIS — J45.909 UNCOMPLICATED ASTHMA, UNSPECIFIED ASTHMA SEVERITY, UNSPECIFIED WHETHER PERSISTENT: ICD-10-CM

## 2019-03-18 RX ORDER — ALBUTEROL SULFATE 90 UG/1
2 AEROSOL, METERED RESPIRATORY (INHALATION) EVERY 4 HOURS PRN
Qty: 1 INHALER | Refills: 11 | Status: SHIPPED | OUTPATIENT
Start: 2019-03-18 | End: 2021-06-11 | Stop reason: SDUPTHER

## 2019-03-18 NOTE — TELEPHONE ENCOUNTER
Have we ever prescribed this med? Yes.  If yes, what date? 02/24/12    Last OV: 03/15/19    Next OV: 09/2019    DX: asthma    Medications: albuterol

## 2019-03-29 ENCOUNTER — HOSPITAL ENCOUNTER (OUTPATIENT)
Dept: RADIOLOGY | Facility: MEDICAL CENTER | Age: 74
End: 2019-03-29
Attending: NURSE PRACTITIONER
Payer: MEDICARE

## 2019-03-29 DIAGNOSIS — S93.412S SPRAIN OF CALCANEOFIBULAR LIGAMENT OF LEFT ANKLE, SEQUELA: ICD-10-CM

## 2019-03-29 DIAGNOSIS — S93.492S SPRAIN OF ANTERIOR TALOFIBULAR LIGAMENT OF LEFT ANKLE, SEQUELA: ICD-10-CM

## 2019-03-29 DIAGNOSIS — S93.422S SPRAIN OF DELTOID LIGAMENT OF LEFT ANKLE, SEQUELA: ICD-10-CM

## 2019-03-29 PROCEDURE — 73721 MRI JNT OF LWR EXTRE W/O DYE: CPT | Mod: LT

## 2019-04-30 ENCOUNTER — TELEPHONE (OUTPATIENT)
Dept: PULMONOLOGY | Facility: HOSPICE | Age: 74
End: 2019-04-30

## 2019-04-30 DIAGNOSIS — G47.34 NOCTURNAL HYPOXIA: ICD-10-CM

## 2019-04-30 DIAGNOSIS — G47.33 OSA (OBSTRUCTIVE SLEEP APNEA): ICD-10-CM

## 2019-04-30 NOTE — TELEPHONE ENCOUNTER
Called patient she doesn't want the study but will do it. Can you please sign for the order.   She will also like a copy of the results.

## 2019-04-30 NOTE — TELEPHONE ENCOUNTER
Patient called to request CNOX results. Done with vital care data scan in [facility]. Test completed on 04/19/2019

## 2019-04-30 NOTE — TELEPHONE ENCOUNTER
Patient has persistent low oxygen levels ALL night. Average saturation 82%.    She needs an in lab titration study to fix this. She declined in the past. It is medically necessary for her health to have study completed in lab to fix her pressures/oxygen levels.   I will sign for study if she gives me the ok.

## 2019-05-24 ENCOUNTER — HOSPITAL ENCOUNTER (OUTPATIENT)
Dept: RADIOLOGY | Facility: MEDICAL CENTER | Age: 74
End: 2019-05-24
Attending: NURSE PRACTITIONER
Payer: MEDICARE

## 2019-05-24 DIAGNOSIS — Z12.31 SCREENING MAMMOGRAM, ENCOUNTER FOR: ICD-10-CM

## 2019-05-24 PROCEDURE — 77063 BREAST TOMOSYNTHESIS BI: CPT

## 2019-05-28 ENCOUNTER — OFFICE VISIT (OUTPATIENT)
Dept: MEDICAL GROUP | Facility: MEDICAL CENTER | Age: 74
End: 2019-05-28
Payer: MEDICARE

## 2019-05-28 VITALS
HEIGHT: 66 IN | RESPIRATION RATE: 16 BRPM | TEMPERATURE: 98.4 F | HEART RATE: 96 BPM | WEIGHT: 217 LBS | BODY MASS INDEX: 34.87 KG/M2 | DIASTOLIC BLOOD PRESSURE: 60 MMHG | OXYGEN SATURATION: 95 % | SYSTOLIC BLOOD PRESSURE: 100 MMHG

## 2019-05-28 DIAGNOSIS — E11.9 TYPE 2 DIABETES MELLITUS WITHOUT COMPLICATION, WITHOUT LONG-TERM CURRENT USE OF INSULIN (HCC): Chronic | ICD-10-CM

## 2019-05-28 DIAGNOSIS — K21.9 GASTROESOPHAGEAL REFLUX DISEASE, ESOPHAGITIS PRESENCE NOT SPECIFIED: Chronic | ICD-10-CM

## 2019-05-28 DIAGNOSIS — E78.5 DYSLIPIDEMIA: Chronic | ICD-10-CM

## 2019-05-28 DIAGNOSIS — I10 BENIGN ESSENTIAL HYPERTENSION: Chronic | ICD-10-CM

## 2019-05-28 PROCEDURE — 99214 OFFICE O/P EST MOD 30 MIN: CPT | Performed by: NURSE PRACTITIONER

## 2019-05-28 ASSESSMENT — PATIENT HEALTH QUESTIONNAIRE - PHQ9: CLINICAL INTERPRETATION OF PHQ2 SCORE: 0

## 2019-05-28 NOTE — PROGRESS NOTES
cc:  Follow up chronic medical conditions.      Subjective:     HPI:     Judy Bland is a 73 y.o. female here to discuss the evaluation and management of:    Here with her  today.    1. Type 2 diabetes mellitus without complication, without long-term current use of insulin (HCC)  Tolerating her metformin 500 mg daily.  Last A1c 6.9%.  Has pending labs.    2. Benign essential hypertension  Stable.  Tolerating her lisinopril/HCTZ, diltiazem without any chest pain, shortness breath or dizziness.  Denies any leg swelling.  States she has a follow-up appointment with her cardiologist in June.    3. Dyslipidemia  Stable.  Tolerating atorvastatin without any myalgias.    4. Gastroesophageal reflux disease, esophagitis presence not specified  Patient states that she has an upcoming EGD in June.  Tolerating her pantoprazole and ranitidine      ROS:  Denies any Headache, Blurred Vision, Confusion, Chest pain,  Shortness of breath,  Abdominal pain, Changes of bowel or bladder, Lower ext edema, Fevers, Nights sweats, Weight Changes, Focal weakness or numbness.  And all other systems are negative.        Current Outpatient Prescriptions:   •  albuterol (VENTOLIN HFA) 108 (90 Base) MCG/ACT Aero Soln inhalation aerosol, Inhale 2 Puffs by mouth every four hours as needed for Shortness of Breath., Disp: 1 Inhaler, Rfl: 11  •  ALPRAZolam (XANAX) 0.5 MG Tab, , Disp: , Rfl:   •  meloxicam (MOBIC) 15 MG tablet, , Disp: , Rfl:   •  montelukast (SINGULAIR) 10 MG Tab, Take 1 Tab by mouth every day., Disp: 90 Tab, Rfl: 3  •  SYMBICORT 160-4.5 MCG/ACT Aerosol, USE 2 INHALATIONS ORALLY   TWICE DAILY WITH SPACER,   RINSE MOUTH AFTER EACH USE., Disp: 3 Inhaler, Rfl: 3  •  atorvastatin (LIPITOR) 20 MG Tab, Take 1 Tab by mouth every bedtime., Disp: 90 Tab, Rfl: 3  •  lisinopril-hydrochlorothiazide (PRINZIDE, ZESTORETIC) 20-12.5 MG per tablet, Take 1 Tab by mouth every morning., Disp: 90 Tab, Rfl: 3  •  raNITidine (ZANTAC) 150 MG  Tab, Take 1 Tab by mouth every day., Disp: 90 Tab, Rfl: 3  •  metFORMIN ER (GLUCOPHAGE XR) 500 MG TABLET SR 24 HR, Take 1 Tab by mouth every bedtime., Disp: 90 Tab, Rfl: 3  •  RESTASIS 0.05 % ophthalmic emulsion, Place 1 Drop in both eyes 2 times a day., Disp: , Rfl:   •  doxepin (SINEQUAN) 25 MG Cap, Take 25 mg by mouth every day., Disp: , Rfl:   •  diltiazem (CARDIZEM) 60 MG Tab, TAKE ONE TABLET BY MOUTH EVERY 12 HOURS, Disp: 180 Tab, Rfl: 3  •  Cetirizine HCl (ZYRTEC PO), Take 1 Each by mouth every day., Disp: , Rfl:   •  pantoprazole (PROTONIX) 40 MG TBEC, Take 40 mg by mouth 2 times a day.  , Disp: , Rfl:   •  Albuterol Sulfate (VENTOLIN HFA INH), Inhale 1 Puff by mouth as needed., Disp: , Rfl:   •  fluticasone (FLONASE) 50 MCG/ACT nasal spray, Spray 1 Spray in nose 2 times a day. Each Nostril , Disp: , Rfl:   •  Acetaminophen (TYLENOL PO), Take  by mouth. EXTRA STRENGH 2 TABS PRN , Disp: , Rfl:     Allergies   Allergen Reactions   • Ultram [Tramadol Hcl]      ELEVATED LIVER ENZYMES       Past Medical History:   Diagnosis Date   • Acquired polycythemia 2/15/2012   • Anesthesia     PONV   • Arthritis    • Arthropathy 2/15/2012   • Benign essential hypertension 2/15/2012   • Chronic hepatitis B (HCC) 2/15/2012   • Cough 2/15/2012   • Difficulty breathing 2/15/2012   • Esophageal reflux 2/15/2012   • Heart burn    • Hepatitis B    • Hypercholesterolemia 2/15/2012   • Hyperlipidemia 2/15/2012   • Hypertension    • Indigestion    • Jaundice    • Murmur 2/15/2012   • Obesity 2/15/2012   • BACILIO (obstructive sleep apnea) 2/15/2012   • Sinus tachycardia 2/15/2012     Past Surgical History:   Procedure Laterality Date   • SEPTOPLASTY  3/12/2012    Performed by MEHDI SWAIN at SURGERY SAME DAY [facility] ORS   • TURBINOPLASTY  3/12/2012    Performed by MEHDI SWAIN at SURGERY SAME DAY [facility] ORS   • SINUSOTOMIES  3/12/2012    Performed by MEHDI SWAIN at SURGERY SAME DAY [facility] ORS   • TRIGGER FINGER RELEASE   "11/23/2009    Performed by MEHDI ONEIL at SURGERY [facility] ORS   • CARPAL TUNNEL RELEASE  2009    b/L   • KNEE ARTHROPLASTY TOTAL  4/8/08    Performed by ROHAN BLACKWELL at SURGERY [facility] ORS   • KNEE ARTHROPLASTY TOTAL  4/8/08    Performed by ROHAN BLACKWELL at SURGERY [facility] ORS   • CATARACT EXTRACTION WITH IOL  2008    OU   • ROTATOR CUFF REPAIR  2005    left   • TRIGGER FINGER RELEASE  2005    left   • CERVICAL CONIZATION      Loop Electrode Excision   • HAND SURGERY     • PRIMARY C SECTION       Family History   Problem Relation Age of Onset   • Other Mother         Family hx of Coronary Arteriosclerosis   • Stroke Mother    • Other Father         Family hx of Coronary Arteriosclerosis   • Heart Attack Father 66     Social History     Social History   • Marital status:      Spouse name: N/A   • Number of children: N/A   • Years of education: N/A     Occupational History   • Not on file.     Social History Main Topics   • Smoking status: Never Smoker   • Smokeless tobacco: Never Used   • Alcohol use 0.6 oz/week     1 Glasses of wine per week      Comment: rarely    • Drug use: No   • Sexual activity: Not Currently     Other Topics Concern   • Not on file     Social History Narrative   • No narrative on file       Objective:     Vitals: /60   Pulse 96   Temp 36.9 °C (98.4 °F)   Resp 16   Ht 1.676 m (5' 6\")   Wt 98.4 kg (217 lb)   SpO2 95%   BMI 35.02 kg/m²    General: Alert, pleasant, NAD  HEENT: Normocephalic.    Skin: Warm, dry, no rashes.  Extremities: No leg edema. No discoloration  Neurological: No tremors  Psych:  Affect/mood is normal, judgement is good, memory is intact, grooming is appropriate.    Assessment/Plan:     Diagnoses and all orders for this visit:    Type 2 diabetes mellitus without complication, without long-term current use of insulin (HCC)  Controlled.  Last A1c 6.9%.  Continue current regimen at this time.  Requesting records from her last retinal " exam.  Labs pending.    Benign essential hypertension  Stable.  Denies any chest pain, shortness of breath or dizziness.  Continue current regimen at this time.  She will be following up with cardiologist in June.  Labs pending.    Dyslipidemia  Stable.  Tolerating atorvastatin without myalgias.  Labs pending.    Gastroesophageal reflux disease, esophagitis presence not specified  Controlled with pantoprazole and ranitidine.  Follow-up with GI in June.        Return in about 6 months (around 11/28/2019).          Jessi SIDHU.

## 2019-05-31 PROBLEM — M54.50 CHRONIC MIDLINE LOW BACK PAIN WITHOUT SCIATICA: Chronic | Status: ACTIVE | Noted: 2018-08-14

## 2019-05-31 PROBLEM — M47.812 SPONDYLOSIS OF CERVICAL REGION WITHOUT MYELOPATHY OR RADICULOPATHY: Chronic | Status: ACTIVE | Noted: 2018-08-14

## 2019-05-31 PROBLEM — Z91.09 ENVIRONMENTAL ALLERGIES: Status: RESOLVED | Noted: 2019-01-23 | Resolved: 2019-05-31

## 2019-05-31 PROBLEM — G89.29 CHRONIC MIDLINE LOW BACK PAIN WITHOUT SCIATICA: Chronic | Status: ACTIVE | Noted: 2018-08-14

## 2019-06-01 LAB
ALBUMIN SERPL-MCNC: 4.7 G/DL (ref 3.5–4.8)
ALBUMIN/CREAT UR: 4.7 MG/G CREAT (ref 0–30)
ALBUMIN/GLOB SERPL: 1.9 {RATIO} (ref 1.2–2.2)
ALP SERPL-CCNC: 103 IU/L (ref 39–117)
ALT SERPL-CCNC: 39 IU/L (ref 0–32)
AST SERPL-CCNC: 25 IU/L (ref 0–40)
BILIRUB SERPL-MCNC: 0.3 MG/DL (ref 0–1.2)
BUN SERPL-MCNC: 17 MG/DL (ref 8–27)
BUN/CREAT SERPL: 18 (ref 12–28)
CALCIUM SERPL-MCNC: 9.5 MG/DL (ref 8.7–10.3)
CHLORIDE SERPL-SCNC: 105 MMOL/L (ref 96–106)
CHOLEST SERPL-MCNC: 158 MG/DL (ref 100–199)
CO2 SERPL-SCNC: 23 MMOL/L (ref 20–29)
CREAT SERPL-MCNC: 0.94 MG/DL (ref 0.57–1)
CREAT UR-MCNC: 66.4 MG/DL
GLOBULIN SER CALC-MCNC: 2.5 G/DL (ref 1.5–4.5)
GLUCOSE SERPL-MCNC: 128 MG/DL (ref 65–99)
HBA1C MFR BLD: 6.8 % (ref 4.8–5.6)
HDLC SERPL-MCNC: 56 MG/DL
LABORATORY COMMENT REPORT: NORMAL
LDLC SERPL CALC-MCNC: 75 MG/DL (ref 0–99)
MICROALBUMIN UR-MCNC: 3.1 UG/ML
POTASSIUM SERPL-SCNC: 4.7 MMOL/L (ref 3.5–5.2)
PROT SERPL-MCNC: 7.2 G/DL (ref 6–8.5)
SODIUM SERPL-SCNC: 145 MMOL/L (ref 134–144)
TRIGL SERPL-MCNC: 133 MG/DL (ref 0–149)
TSH SERPL DL<=0.005 MIU/L-ACNC: 2.18 UIU/ML (ref 0.45–4.5)
VLDLC SERPL CALC-MCNC: 27 MG/DL (ref 5–40)

## 2019-06-06 ENCOUNTER — OFFICE VISIT (OUTPATIENT)
Dept: CARDIOLOGY | Facility: MEDICAL CENTER | Age: 74
End: 2019-06-06
Payer: MEDICARE

## 2019-06-06 VITALS
SYSTOLIC BLOOD PRESSURE: 130 MMHG | WEIGHT: 216.8 LBS | HEART RATE: 100 BPM | BODY MASS INDEX: 34.84 KG/M2 | OXYGEN SATURATION: 93 % | DIASTOLIC BLOOD PRESSURE: 70 MMHG | HEIGHT: 66 IN

## 2019-06-06 DIAGNOSIS — I25.84 CORONARY ARTERY DISEASE DUE TO CALCIFIED CORONARY LESION: ICD-10-CM

## 2019-06-06 DIAGNOSIS — R06.09 DOE (DYSPNEA ON EXERTION): ICD-10-CM

## 2019-06-06 DIAGNOSIS — I10 BENIGN ESSENTIAL HYPERTENSION: Chronic | ICD-10-CM

## 2019-06-06 DIAGNOSIS — I25.10 CORONARY ARTERY CALCIFICATION SEEN ON CAT SCAN: ICD-10-CM

## 2019-06-06 DIAGNOSIS — I25.10 CORONARY ARTERY DISEASE DUE TO CALCIFIED CORONARY LESION: ICD-10-CM

## 2019-06-06 DIAGNOSIS — E78.5 DYSLIPIDEMIA: Chronic | ICD-10-CM

## 2019-06-06 LAB — EKG IMPRESSION: NORMAL

## 2019-06-06 PROCEDURE — 99214 OFFICE O/P EST MOD 30 MIN: CPT | Performed by: INTERNAL MEDICINE

## 2019-06-06 PROCEDURE — 93000 ELECTROCARDIOGRAM COMPLETE: CPT | Performed by: INTERNAL MEDICINE

## 2019-06-06 ASSESSMENT — ENCOUNTER SYMPTOMS
MYALGIAS: 0
PND: 0
SHORTNESS OF BREATH: 1
ORTHOPNEA: 0
PALPITATIONS: 0
LOSS OF CONSCIOUSNESS: 0
DIZZINESS: 0

## 2019-06-06 NOTE — PROGRESS NOTES
Chief Complaint   Patient presents with   • Hyperlipidemia   • Tachycardia   • Shortness of Breath     Pulmonary recommanded an ECHO       Subjective:   Judy Bland is a 73 y.o. female who presents today hypertension, tachycardia with a history of asthma, obstructive sleep apnea and exertional shortness of breath, hyperlipidemia.    Last seen on 3/23/2018.    Since 3/23/2018 appointment the patient continues to have significant exertional shortness of breath without anginal symptoms.  Walking up an incline a short distance or up stairs at home results in significant dyspnea relieved with rest.  Never smoked cigarettes.  PFTs generally unremarkable but continues with a diagnosis of asthma and bronchodilator therapy.  Previous standard exercise treadmill stress test was unremarkable except hypertensive response exercise.    Since 3/16/2017 appointment the patient has had no new cardiac symptoms.  Continues to have exertional shortness of breath.  Her  has developed Alzheimer's disease and his short-term memory is worse.  Walks at the [email or web address] which is a 2 mile course now having to stop 3 times.  Previous normal pulmonary function test.    Past medical history  No specific cardiac symptoms though does have some exertional shortness of breath which generally has been chronic.  Has asthma uses daily inhalers.  No specific angina.  Tries to walk some every day  No PND orthopnea or lower extremity edema.  Compliant with her medications.    In December, 2014, saw her pulmonologist.  PFTs showed fracture of being overweight but no obstructive disease.  Takes medicines for asthma diagnosed 3 years ago.  A lot of problems with Kenalog causing eczema than had to be on long-term prednisone.    No history of CAD or angina.  Previous echocardiograms had shown left ventricular hypertrophy but no valve disease. Right heart pressures could not be evaluated.  Had a remote stress echocardiogram that was apparently  unremarkable.    Past Medical History:   Diagnosis Date   • Acquired polycythemia 2/15/2012   • Anesthesia     PONV   • Arthritis    • Arthropathy 2/15/2012   • Benign essential hypertension 2/15/2012   • Chronic hepatitis B (HCC) 2/15/2012   • Cough 2/15/2012   • Difficulty breathing 2/15/2012   • Esophageal reflux 2/15/2012   • Heart burn    • Hepatitis B    • Hypercholesterolemia 2/15/2012   • Hyperlipidemia 2/15/2012   • Hypertension    • Indigestion    • Jaundice    • Murmur 2/15/2012   • Obesity 2/15/2012   • BACILIO (obstructive sleep apnea) 2/15/2012   • Sinus tachycardia 2/15/2012     Past Surgical History:   Procedure Laterality Date   • SEPTOPLASTY  3/12/2012    Performed by MEHDI SWAIN at SURGERY SAME DAY [facility] ORS   • TURBINOPLASTY  3/12/2012    Performed by MEHID SWAIN at SURGERY SAME DAY [facility] ORS   • SINUSOTOMIES  3/12/2012    Performed by MEHDI SWAIN at SURGERY SAME DAY [facility] ORS   • TRIGGER FINGER RELEASE  11/23/2009    Performed by MEHDI ONEIL at SURGERY [facility] ORS   • CARPAL TUNNEL RELEASE  2009    b/L   • KNEE ARTHROPLASTY TOTAL  4/8/08    Performed by ROHAN BLACKWELL at SURGERY [facility] ORS   • KNEE ARTHROPLASTY TOTAL  4/8/08    Performed by ROHAN BLACKWELL at SURGERY [facility] ORS   • CATARACT EXTRACTION WITH IOL  2008    OU   • ROTATOR CUFF REPAIR  2005    left   • TRIGGER FINGER RELEASE  2005    left   • CERVICAL CONIZATION      Loop Electrode Excision   • HAND SURGERY     • PRIMARY C SECTION       Family History   Problem Relation Age of Onset   • Other Mother         Family hx of Coronary Arteriosclerosis   • Stroke Mother    • Other Father         Family hx of Coronary Arteriosclerosis   • Heart Attack Father 66     Social History     Social History   • Marital status:      Spouse name: N/A   • Number of children: N/A   • Years of education: N/A     Occupational History   • Not on file.     Social History Main Topics   • Smoking status: Never Smoker   •  Smokeless tobacco: Never Used   • Alcohol use 0.6 oz/week     1 Glasses of wine per week      Comment: rarely    • Drug use: No   • Sexual activity: Not Currently     Other Topics Concern   • Not on file     Social History Narrative   • No narrative on file     Allergies   Allergen Reactions   • Ultram [Tramadol Hcl]      ELEVATED LIVER ENZYMES     Outpatient Encounter Prescriptions as of 6/6/2019   Medication Sig Dispense Refill   • meloxicam (MOBIC) 15 MG tablet      • montelukast (SINGULAIR) 10 MG Tab Take 1 Tab by mouth every day. 90 Tab 3   • SYMBICORT 160-4.5 MCG/ACT Aerosol USE 2 INHALATIONS ORALLY   TWICE DAILY WITH SPACER,   RINSE MOUTH AFTER EACH USE. 3 Inhaler 3   • atorvastatin (LIPITOR) 20 MG Tab Take 1 Tab by mouth every bedtime. 90 Tab 3   • lisinopril-hydrochlorothiazide (PRINZIDE, ZESTORETIC) 20-12.5 MG per tablet Take 1 Tab by mouth every morning. 90 Tab 3   • raNITidine (ZANTAC) 150 MG Tab Take 1 Tab by mouth every day. 90 Tab 3   • metFORMIN ER (GLUCOPHAGE XR) 500 MG TABLET SR 24 HR Take 1 Tab by mouth every bedtime. 90 Tab 3   • RESTASIS 0.05 % ophthalmic emulsion Place 1 Drop in both eyes 2 times a day.     • doxepin (SINEQUAN) 25 MG Cap Take 25 mg by mouth every day.     • diltiazem (CARDIZEM) 60 MG Tab TAKE ONE TABLET BY MOUTH EVERY 12 HOURS 180 Tab 3   • Cetirizine HCl (ZYRTEC PO) Take 1 Each by mouth every day.     • pantoprazole (PROTONIX) 40 MG TBEC Take 40 mg by mouth 2 times a day.       • fluticasone (FLONASE) 50 MCG/ACT nasal spray Spray 1 Spray in nose 2 times a day. Each Nostril      • albuterol (VENTOLIN HFA) 108 (90 Base) MCG/ACT Aero Soln inhalation aerosol Inhale 2 Puffs by mouth every four hours as needed for Shortness of Breath. 1 Inhaler 11   • ALPRAZolam (XANAX) 0.5 MG Tab      • Acetaminophen (TYLENOL PO) Take  by mouth. EXTRA STRENGH 2 TABS PRN      • Albuterol Sulfate (VENTOLIN HFA INH) Inhale 1 Puff by mouth as needed.       No facility-administered encounter medications  "on file as of 6/6/2019.      Review of Systems   Respiratory: Positive for shortness of breath.    Cardiovascular: Negative for chest pain, palpitations, orthopnea, leg swelling and PND.   Musculoskeletal: Negative for myalgias.   Neurological: Negative for dizziness and loss of consciousness.        Objective:   /70 (BP Location: Left arm, Patient Position: Sitting, BP Cuff Size: Adult)   Pulse 100   Ht 1.676 m (5' 6\")   Wt 98.3 kg (216 lb 12.8 oz)   SpO2 93%   BMI 34.99 kg/m²     Physical Exam   Constitutional: She is oriented to person, place, and time. No distress.   Neck: No JVD present.   Cardiovascular: Normal rate and regular rhythm.    No murmur heard.  Pulses:       Carotid pulses are 1+ on the right side, and 1+ on the left side.       Radial pulses are 1+ on the right side, and 1+ on the left side.        Posterior tibial pulses are 1+ on the right side, and 1+ on the left side.   Pulmonary/Chest: Effort normal. She has no wheezes. She has no rales.   Increased AP diameter   Abdominal:   Protuberant.   Musculoskeletal: She exhibits no edema.   Neurological: She is alert and oriented to person, place, and time.     PFTs.  INTERPRETATION: Lung function testing completed July 26, 2017 revealed normal spirometry. No change after bronchodilators. Lung volumes do not demonstrate significant restriction or hyperinflation. Low expiratory reserve volume is consistent with the patient's elevated BMI. Oxygen transfer was normal. Good effort noted.    03/4/2011 ECHOCARDIOGRAM  EF greater than 65%. Mild to moderate left hypertrophy. Pulmonary pressures cannot be assessed.    03/06/2015 ECHOCARDIOGRAM  Normal left ventricular systolic function.  Normal regional wall motion with vigorous global contractility.  Left ventricular ejection fraction is 65% to 70%.  Normal right ventricular systolic function.  Aortic sclerosis without stenosis.  Right ventricular systolic pressure is estimated to be 24-29 " mmHg.  Anterior echo free space with some enhanced echogenicity of the   posterior pericardium; uncertain significance.     TREADMILL 04/02/2018  1. Limited exercise capacity but adequate heart rate response.  2. Negative exercise treadmill stress test for ischemia.  3. No arrhythmias.  4. Hypertensive response to exercise.    ABDOMINAL CT SCAN 2013  Dense diffuse ostial, proximal and mid LAD calcification    01/18/2016 Cholesterol 159. Triglycerides 155. HDL 61. LDL 67.    03/16/2017 EKG: Normal sinus rhythm, rate 99. Isolated PVC.    Assessment:     1. LLOYD (dyspnea on exertion)  EKG    NM-CARDIAC STRESS TEST   2. Coronary artery calcification seen on CAT scan     3. Coronary artery disease due to calcified coronary lesion  EKG    NM-CARDIAC STRESS TEST   4. Benign essential hypertension     5. Dyslipidemia       Medical Decision Making:  Today's Assessment / Status / Plan:     Shortness of breath with exertion.  Possible anginal equivalent in view of the presence of diffuse coronary artery disease involving the ostial and proximal left anterior descending artery.    Coronary artery disease as demonstrated by coronary calcification on CT scan.    Hypertension. Controlled.    Hyperlipidemia. On atorvastatin.    Elevated liver function tests.    Asthma.    Sleep apnea on CPAP.    Recommendations  1.  I reviewed with the patient and her  the images of the CT scan showing dense diffuse coronary calcification.  2.  I reviewed with the patient the nature of her symptoms and the concern of anginal equivalent ischemia causing her shortness of breath.  3.  EKG.  4.  Nuclear exercise treadmill stress test.  5.  I will make further recommendations based on the results of the MPI.

## 2019-06-12 RX ORDER — LISINOPRIL AND HYDROCHLOROTHIAZIDE 20; 12.5 MG/1; MG/1
TABLET ORAL
Qty: 90 TAB | Refills: 2 | Status: SHIPPED | OUTPATIENT
Start: 2019-06-12 | End: 2020-03-09

## 2019-06-18 ENCOUNTER — HOSPITAL ENCOUNTER (OUTPATIENT)
Dept: RADIOLOGY | Facility: MEDICAL CENTER | Age: 74
End: 2019-06-18
Attending: INTERNAL MEDICINE
Payer: MEDICARE

## 2019-06-18 DIAGNOSIS — R06.09 DOE (DYSPNEA ON EXERTION): ICD-10-CM

## 2019-06-18 DIAGNOSIS — I25.10 CORONARY ARTERY DISEASE DUE TO CALCIFIED CORONARY LESION: ICD-10-CM

## 2019-06-18 DIAGNOSIS — I25.84 CORONARY ARTERY DISEASE DUE TO CALCIFIED CORONARY LESION: ICD-10-CM

## 2019-06-18 PROCEDURE — 93018 CV STRESS TEST I&R ONLY: CPT | Performed by: INTERNAL MEDICINE

## 2019-06-18 PROCEDURE — A9502 TC99M TETROFOSMIN: HCPCS

## 2019-06-18 PROCEDURE — 78452 HT MUSCLE IMAGE SPECT MULT: CPT | Mod: 26 | Performed by: INTERNAL MEDICINE

## 2019-07-01 RX ORDER — ATORVASTATIN CALCIUM 20 MG/1
TABLET, FILM COATED ORAL
Qty: 90 TAB | Refills: 3 | Status: SHIPPED | OUTPATIENT
Start: 2019-07-01 | End: 2020-03-16

## 2019-08-27 ENCOUNTER — APPOINTMENT (RX ONLY)
Dept: URBAN - METROPOLITAN AREA CLINIC 4 | Facility: CLINIC | Age: 74
Setting detail: DERMATOLOGY
End: 2019-08-27

## 2019-08-27 DIAGNOSIS — L82.0 INFLAMED SEBORRHEIC KERATOSIS: ICD-10-CM

## 2019-08-27 DIAGNOSIS — Z71.89 OTHER SPECIFIED COUNSELING: ICD-10-CM

## 2019-08-27 DIAGNOSIS — D18.0 HEMANGIOMA: ICD-10-CM

## 2019-08-27 DIAGNOSIS — L81.4 OTHER MELANIN HYPERPIGMENTATION: ICD-10-CM

## 2019-08-27 DIAGNOSIS — L82.1 OTHER SEBORRHEIC KERATOSIS: ICD-10-CM

## 2019-08-27 DIAGNOSIS — L57.8 OTHER SKIN CHANGES DUE TO CHRONIC EXPOSURE TO NONIONIZING RADIATION: ICD-10-CM

## 2019-08-27 DIAGNOSIS — D22 MELANOCYTIC NEVI: ICD-10-CM

## 2019-08-27 PROBLEM — D48.5 NEOPLASM OF UNCERTAIN BEHAVIOR OF SKIN: Status: ACTIVE | Noted: 2019-08-27

## 2019-08-27 PROBLEM — D22.5 MELANOCYTIC NEVI OF TRUNK: Status: ACTIVE | Noted: 2019-08-27

## 2019-08-27 PROBLEM — D18.01 HEMANGIOMA OF SKIN AND SUBCUTANEOUS TISSUE: Status: ACTIVE | Noted: 2019-08-27

## 2019-08-27 PROCEDURE — ? LIQUID NITROGEN

## 2019-08-27 PROCEDURE — ? BIOPSY BY SHAVE METHOD

## 2019-08-27 PROCEDURE — ? COUNSELING

## 2019-08-27 PROCEDURE — ? ADDITIONAL NOTES

## 2019-08-27 PROCEDURE — 17111 DESTRUCTION B9 LESIONS 15/>: CPT

## 2019-08-27 PROCEDURE — 99214 OFFICE O/P EST MOD 30 MIN: CPT | Mod: 25

## 2019-08-27 PROCEDURE — 11102 TANGNTL BX SKIN SINGLE LES: CPT | Mod: 59

## 2019-08-27 ASSESSMENT — LOCATION ZONE DERM
LOCATION ZONE: FACE
LOCATION ZONE: ARM
LOCATION ZONE: TRUNK
LOCATION ZONE: SCALP

## 2019-08-27 ASSESSMENT — LOCATION DETAILED DESCRIPTION DERM
LOCATION DETAILED: STERNAL NOTCH
LOCATION DETAILED: RIGHT INFERIOR CENTRAL MALAR CHEEK
LOCATION DETAILED: LEFT INFERIOR MEDIAL MIDBACK
LOCATION DETAILED: LEFT INFERIOR FOREHEAD
LOCATION DETAILED: LEFT FOREHEAD
LOCATION DETAILED: INFERIOR THORACIC SPINE
LOCATION DETAILED: RIGHT PROXIMAL DORSAL FOREARM
LOCATION DETAILED: LEFT PROXIMAL DORSAL FOREARM
LOCATION DETAILED: LEFT INFERIOR CENTRAL MALAR CHEEK
LOCATION DETAILED: LEFT SUPERIOR LATERAL FOREHEAD
LOCATION DETAILED: RIGHT FOREHEAD
LOCATION DETAILED: LEFT DISTAL DORSAL FOREARM
LOCATION DETAILED: RIGHT LATERAL FOREHEAD
LOCATION DETAILED: RIGHT DISTAL DORSAL FOREARM
LOCATION DETAILED: UPPER STERNUM
LOCATION DETAILED: RIGHT SUPERIOR FOREHEAD
LOCATION DETAILED: RIGHT SUPERIOR PARIETAL SCALP
LOCATION DETAILED: LEFT SUPERIOR FOREHEAD
LOCATION DETAILED: RIGHT SUPERIOR MEDIAL MIDBACK

## 2019-08-27 ASSESSMENT — LOCATION SIMPLE DESCRIPTION DERM
LOCATION SIMPLE: LEFT LOWER BACK
LOCATION SIMPLE: SCALP
LOCATION SIMPLE: LEFT CHEEK
LOCATION SIMPLE: CHEST
LOCATION SIMPLE: RIGHT CHEEK
LOCATION SIMPLE: LEFT FOREHEAD
LOCATION SIMPLE: UPPER BACK
LOCATION SIMPLE: RIGHT LOWER BACK
LOCATION SIMPLE: RIGHT FOREARM
LOCATION SIMPLE: RIGHT FOREHEAD
LOCATION SIMPLE: LEFT FOREARM

## 2019-08-27 NOTE — PROCEDURE: LIQUID NITROGEN
Detail Level: Detailed
Post-Care Instructions: I reviewed with the patient in detail post-care instructions. Patient is to wear sunprotection, and avoid picking at any of the treated lesions. Pt may apply Vaseline to crusted or scabbing areas.
Render Post-Care Instructions In Note?: no
Medical Necessity Information: It is in your best interest to select a reason for this procedure from the list below. All of these items fulfill various CMS LCD requirements except the new and changing color options.
Medical Necessity Clause: This procedure was medically necessary because the lesions that were treated were:
Consent: The patient's consent was obtained including but not limited to risks of crusting, scabbing, blistering, scarring, darker or lighter pigmentary change, recurrence, incomplete removal and infection.

## 2019-08-27 NOTE — HPI: SKIN LESION
Is This A New Presentation, Or A Follow-Up?: Skin Lesion
What Type Of Note Output Would You Prefer (Optional)?: Standard Output
How Severe Is Your Skin Lesion?: mild
Has Your Skin Lesion Been Treated?: not been treated
Is This A New Presentation, Or A Follow-Up?: Growths

## 2019-08-27 NOTE — PROCEDURE: BIOPSY BY SHAVE METHOD
Detail Level: Detailed
Render Post-Care Instructions In Note?: no
Was A Bandage Applied: Yes
Electrodesiccation And Curettage Text: The wound bed was treated with electrodesiccation and curettage after the biopsy was performed.
Dressing: Band-Aid
Anesthesia Volume In Cc: 0
Type Of Destruction Used: Curettage
Consent: Written consent was obtained and risks were reviewed including but not limited to scarring, infection, bleeding, scabbing, incomplete removal, nerve damage and allergy to anesthesia.
Billing Type: Third-Party Bill
Hemostasis: Aluminum Chloride and Electrocautery
Silver Nitrate Text: The wound bed was treated with silver nitrate after the biopsy was performed.
Biopsy Method: 15 blade
Anesthesia Type: 1% lidocaine with epinephrine
Cryotherapy Text: The wound bed was treated with cryotherapy after the biopsy was performed.
Depth Of Biopsy: dermis
Post-Care Instructions: I reviewed with the patient in detail post-care instructions. Patient is to keep the biopsy site dry overnight, and then apply bacitracin twice daily until healed. Patient may apply hydrogen peroxide soaks to remove any crusting.
Curettage Text: The wound bed was treated with curettage after the biopsy was performed.
Lab: [id number]
Size Of Lesion In Cm: 1.1
Electrodesiccation Text: The wound bed was treated with electrodesiccation after the biopsy was performed.
Biopsy Type: H and E
Notification Instructions: Patient will be notified of biopsy results. However, patient instructed to call the office if not contacted within 2 weeks.
Wound Care: Aquaphor
Lab Facility:

## 2019-09-11 ENCOUNTER — OFFICE VISIT (OUTPATIENT)
Dept: URGENT CARE | Facility: PHYSICIAN GROUP | Age: 74
End: 2019-09-11
Payer: MEDICARE

## 2019-09-11 ENCOUNTER — HOSPITAL ENCOUNTER (OUTPATIENT)
Facility: MEDICAL CENTER | Age: 74
End: 2019-09-11
Attending: PHYSICIAN ASSISTANT
Payer: MEDICARE

## 2019-09-11 VITALS
HEART RATE: 103 BPM | SYSTOLIC BLOOD PRESSURE: 108 MMHG | BODY MASS INDEX: 34.72 KG/M2 | TEMPERATURE: 97.7 F | DIASTOLIC BLOOD PRESSURE: 66 MMHG | RESPIRATION RATE: 18 BRPM | HEIGHT: 66 IN | OXYGEN SATURATION: 94 % | WEIGHT: 216 LBS

## 2019-09-11 DIAGNOSIS — N30.01 ACUTE CYSTITIS WITH HEMATURIA: Primary | ICD-10-CM

## 2019-09-11 DIAGNOSIS — N30.01 ACUTE CYSTITIS WITH HEMATURIA: ICD-10-CM

## 2019-09-11 DIAGNOSIS — R30.0 DYSURIA: ICD-10-CM

## 2019-09-11 LAB
APPEARANCE UR: CLEAR
BILIRUB UR STRIP-MCNC: NORMAL MG/DL
COLOR UR AUTO: NORMAL
GLUCOSE UR STRIP.AUTO-MCNC: NORMAL MG/DL
KETONES UR STRIP.AUTO-MCNC: NORMAL MG/DL
LEUKOCYTE ESTERASE UR QL STRIP.AUTO: NORMAL
NITRITE UR QL STRIP.AUTO: NORMAL
PH UR STRIP.AUTO: 5.5 [PH] (ref 5–8)
PROT UR QL STRIP: 30 MG/DL
RBC UR QL AUTO: NORMAL
SP GR UR STRIP.AUTO: 1.01
UROBILINOGEN UR STRIP-MCNC: 0.2 MG/DL

## 2019-09-11 PROCEDURE — 81002 URINALYSIS NONAUTO W/O SCOPE: CPT | Performed by: PHYSICIAN ASSISTANT

## 2019-09-11 PROCEDURE — 99214 OFFICE O/P EST MOD 30 MIN: CPT | Performed by: PHYSICIAN ASSISTANT

## 2019-09-11 PROCEDURE — 87086 URINE CULTURE/COLONY COUNT: CPT

## 2019-09-11 RX ORDER — NITROFURANTOIN 25; 75 MG/1; MG/1
100 CAPSULE ORAL EVERY 12 HOURS
Qty: 14 CAP | Refills: 0 | Status: SHIPPED | OUTPATIENT
Start: 2019-09-11 | End: 2019-09-18

## 2019-09-11 ASSESSMENT — ENCOUNTER SYMPTOMS
CHILLS: 0
NAUSEA: 0
VOMITING: 0
SWEATS: 0
FLANK PAIN: 0

## 2019-09-11 NOTE — PROGRESS NOTES
Subjective:   Judy Bland is a 74 y.o. female who presents for Dysuria (x1day)        Dysuria    This is a new problem. The current episode started yesterday. The problem occurs every urination. The problem has been rapidly worsening. The quality of the pain is described as burning. The pain is moderate. There has been no fever. She is not sexually active. There is no history of pyelonephritis. Associated symptoms include frequency and urgency. Pertinent negatives include no chills, discharge, flank pain, hematuria, hesitancy, nausea, possible pregnancy, sweats or vomiting. Treatments tried: pyridium. The treatment provided moderate relief. There is no history of recurrent UTIs. last UTI 1 yr ago     Review of Systems   Constitutional: Negative for chills.   Gastrointestinal: Negative for nausea and vomiting.   Genitourinary: Positive for dysuria, frequency and urgency. Negative for flank pain, hematuria and hesitancy.       PMH:  has a past medical history of Acquired polycythemia (2/15/2012), Anesthesia, Arthritis, Arthropathy (2/15/2012), Benign essential hypertension (2/15/2012), Chronic hepatitis B (HCC) (2/15/2012), Cough (2/15/2012), Difficulty breathing (2/15/2012), Esophageal reflux (2/15/2012), Heart burn, Hepatitis B, Hypercholesterolemia (2/15/2012), Hyperlipidemia (2/15/2012), Hypertension, Indigestion, Jaundice, Murmur (2/15/2012), Obesity (2/15/2012), BACILIO (obstructive sleep apnea) (2/15/2012), and Sinus tachycardia (2/15/2012).  MEDS:   Current Outpatient Medications:   •  nitrofurantoin monohyd macro (MACROBID) 100 MG Cap, Take 1 Cap by mouth every 12 hours for 7 days., Disp: 14 Cap, Rfl: 0  •  atorvastatin (LIPITOR) 20 MG Tab, TAKE 1 TABLET AT BEDTIME, Disp: 90 Tab, Rfl: 3  •  raNITidine (ZANTAC) 150 MG Tab, TAKE ONE TABLET BY MOUTH DAILY, Disp: 90 Tab, Rfl: 2  •  metFORMIN ER (GLUCOPHAGE XR) 500 MG TABLET SR 24 HR, TAKE ONE TABLET BY MOUTH EVERY NIGHT AT BEDTIME, Disp: 90 Tab, Rfl: 2  •   lisinopril-hydrochlorothiazide (PRINZIDE, ZESTORETIC) 20-12.5 MG per tablet, TAKE ONE TABLET BY MOUTH EVERY MORNING, Disp: 90 Tab, Rfl: 2  •  albuterol (VENTOLIN HFA) 108 (90 Base) MCG/ACT Aero Soln inhalation aerosol, Inhale 2 Puffs by mouth every four hours as needed for Shortness of Breath., Disp: 1 Inhaler, Rfl: 11  •  meloxicam (MOBIC) 15 MG tablet, , Disp: , Rfl:   •  montelukast (SINGULAIR) 10 MG Tab, Take 1 Tab by mouth every day., Disp: 90 Tab, Rfl: 3  •  SYMBICORT 160-4.5 MCG/ACT Aerosol, USE 2 INHALATIONS ORALLY   TWICE DAILY WITH SPACER,   RINSE MOUTH AFTER EACH USE., Disp: 3 Inhaler, Rfl: 3  •  RESTASIS 0.05 % ophthalmic emulsion, Place 1 Drop in both eyes 2 times a day., Disp: , Rfl:   •  doxepin (SINEQUAN) 25 MG Cap, Take 25 mg by mouth every day., Disp: , Rfl:   •  diltiazem (CARDIZEM) 60 MG Tab, TAKE ONE TABLET BY MOUTH EVERY 12 HOURS, Disp: 180 Tab, Rfl: 3  •  Cetirizine HCl (ZYRTEC PO), Take 1 Each by mouth every day., Disp: , Rfl:   •  pantoprazole (PROTONIX) 40 MG TBEC, Take 40 mg by mouth 2 times a day.  , Disp: , Rfl:   •  Acetaminophen (TYLENOL PO), Take  by mouth. EXTRA STRENGH 2 TABS PRN , Disp: , Rfl:   •  Albuterol Sulfate (VENTOLIN HFA INH), Inhale 1 Puff by mouth as needed., Disp: , Rfl:   •  fluticasone (FLONASE) 50 MCG/ACT nasal spray, Spray 1 Spray in nose 2 times a day. Each Nostril , Disp: , Rfl:   ALLERGIES:   Allergies   Allergen Reactions   • Ultram [Tramadol Hcl]      ELEVATED LIVER ENZYMES     SURGHX:   Past Surgical History:   Procedure Laterality Date   • SEPTOPLASTY  3/12/2012    Performed by MEHDI SWAIN at SURGERY SAME DAY [facility] ORS   • TURBINOPLASTY  3/12/2012    Performed by MEHDI SWAIN at SURGERY SAME DAY [facility] ORS   • SINUSOTOMIES  3/12/2012    Performed by MEHDI SWAIN at SURGERY SAME DAY [facility] ORS   • TRIGGER FINGER RELEASE  11/23/2009    Performed by MEHDI ONEIL at SURGERY [facility] ORS   • CARPAL TUNNEL RELEASE  2009 b/L   • KNEE  "ARTHROPLASTY TOTAL  4/8/08    Performed by ROHAN BLACKWELL at SURGERY [facility] ORS   • KNEE ARTHROPLASTY TOTAL  4/8/08    Performed by ROHAN BLACKWELL at SURGERY [facility] ORS   • CATARACT EXTRACTION WITH IOL  2008    OU   • ROTATOR CUFF REPAIR  2005    left   • TRIGGER FINGER RELEASE  2005    left   • CERVICAL CONIZATION      Loop Electrode Excision   • HAND SURGERY     • PRIMARY C SECTION       SOCHX:  reports that she has never smoked. She has never used smokeless tobacco. She reports that she drinks about 0.6 oz of alcohol per week. She reports that she does not use drugs.  FH: Family history was reviewed, no pertinent findings to report   Objective:   /66   Pulse (!) 103   Temp 36.5 °C (97.7 °F) (Temporal)   Resp 18   Ht 1.676 m (5' 6\")   Wt 98 kg (216 lb)   SpO2 94%   BMI 34.86 kg/m²   Physical Exam   Constitutional: She is oriented to person, place, and time. She appears well-developed and well-nourished.  Non-toxic appearance. No distress.   HENT:   Head: Normocephalic and atraumatic.   Right Ear: External ear normal.   Left Ear: External ear normal.   Nose: Nose normal.   Eyes: Conjunctivae and lids are normal.   Neck: Neck supple.   Cardiovascular: Normal rate and regular rhythm.   Pulmonary/Chest: Effort normal and breath sounds normal. No respiratory distress.   Abdominal: Normal appearance. There is tenderness in the suprapubic area. There is no CVA tenderness.   Musculoskeletal:   Normal range of motion. Exhibits no edema and no tenderness.    Neurological: She is alert and oriented to person, place, and time. No cranial nerve deficit or sensory deficit.   Skin: Skin is warm, dry and intact. Capillary refill takes less than 2 seconds.   Psychiatric: She has a normal mood and affect. Her speech is normal and behavior is normal. Judgment and thought content normal. Cognition and memory are normal.   Vitals reviewed.        Assessment/Plan:   1. Acute cystitis with hematuria  - Urine " Culture; Future  - nitrofurantoin monohyd macro (MACROBID) 100 MG Cap; Take 1 Cap by mouth every 12 hours for 7 days.  Dispense: 14 Cap; Refill: 0    2. Dysuria    UA and PE consistent with acute cystitis. Pyelonephritis unlikely as pt has no flank pain, CVA tenderness, N/V, F/C.     Finish entire course of antibiotics even if symptoms resolve earlier.  Take a probiotic or eat Tangela’s yogurt or kefir while taking the medication.  Drink 8, 8oz glasses of water every day.  If you have frequent UTIs or develop them after sexual intercourse try taking D-Mannose 1000mg, two or three times a day or for 3 days following intercourse.  If symptoms fail to improve in 48 hours, worsen or you develop fever, flank pain, nausea, vomiting, or other new symptoms return to the clinic immediately or go the ER.    Differential diagnosis, natural history, supportive care, and indications for immediate follow-up discussed.

## 2019-09-13 LAB
BACTERIA UR CULT: NORMAL
SIGNIFICANT IND 70042: NORMAL
SITE SITE: NORMAL
SOURCE SOURCE: NORMAL

## 2019-09-14 DIAGNOSIS — I10 ESSENTIAL HYPERTENSION: ICD-10-CM

## 2019-09-19 RX ORDER — DILTIAZEM HYDROCHLORIDE 60 MG/1
TABLET, FILM COATED ORAL
Qty: 180 TAB | Refills: 3 | Status: SHIPPED | OUTPATIENT
Start: 2019-09-19 | End: 2020-01-09

## 2019-09-27 ENCOUNTER — APPOINTMENT (OUTPATIENT)
Dept: PULMONOLOGY | Facility: HOSPICE | Age: 74
End: 2019-09-27
Payer: MEDICARE

## 2019-10-09 DIAGNOSIS — J44.9 CHRONIC OBSTRUCTIVE PULMONARY DISEASE, UNSPECIFIED COPD TYPE (HCC): ICD-10-CM

## 2019-10-09 DIAGNOSIS — J45.909 UNCOMPLICATED ASTHMA, UNSPECIFIED ASTHMA SEVERITY, UNSPECIFIED WHETHER PERSISTENT: ICD-10-CM

## 2019-10-09 RX ORDER — BUDESONIDE AND FORMOTEROL FUMARATE DIHYDRATE 160; 4.5 UG/1; UG/1
AEROSOL RESPIRATORY (INHALATION)
Qty: 3 INHALER | Refills: 3 | Status: SHIPPED | OUTPATIENT
Start: 2019-10-09 | End: 2020-08-17

## 2019-10-09 NOTE — TELEPHONE ENCOUNTER
Caller Name: Judy Bland                 Call Back Number: [phone] (home)         Patient approves a detailed voicemail message: N\A    Have we ever prescribed this med? Yes.  If yes, what date? 7/5/19     Last OV: 3/15/19 KELI Sr, APRN     Next OV: 1/9/20 KELI Sr ARPN     DX: asthma     Medications:  Requested Prescriptions     Pending Prescriptions Disp Refills   • budesonide-formoterol (SYMBICORT) 160-4.5 MCG/ACT Aerosol [Pharmacy Med Name: SYMBICORT -4.5] 30.6 g 3     Sig: USE 2 INHALATIONS ORALLY   TWICE DAILY WITH SPACER,   RINSE MOUTH AFTER EACH USE.

## 2019-11-11 ENCOUNTER — OFFICE VISIT (OUTPATIENT)
Dept: CARDIOLOGY | Facility: MEDICAL CENTER | Age: 74
End: 2019-11-11
Payer: MEDICARE

## 2019-11-11 VITALS
OXYGEN SATURATION: 92 % | HEART RATE: 110 BPM | SYSTOLIC BLOOD PRESSURE: 102 MMHG | DIASTOLIC BLOOD PRESSURE: 60 MMHG | WEIGHT: 212.8 LBS | BODY MASS INDEX: 34.2 KG/M2 | HEIGHT: 66 IN

## 2019-11-11 DIAGNOSIS — R00.0 SINUS TACHYCARDIA: Chronic | ICD-10-CM

## 2019-11-11 DIAGNOSIS — E78.5 DYSLIPIDEMIA: Chronic | ICD-10-CM

## 2019-11-11 DIAGNOSIS — I25.10 CORONARY ARTERY CALCIFICATION SEEN ON CAT SCAN: ICD-10-CM

## 2019-11-11 DIAGNOSIS — I25.10 CORONARY ARTERY DISEASE DUE TO CALCIFIED CORONARY LESION: ICD-10-CM

## 2019-11-11 DIAGNOSIS — I25.84 CORONARY ARTERY DISEASE DUE TO CALCIFIED CORONARY LESION: ICD-10-CM

## 2019-11-11 PROCEDURE — 99214 OFFICE O/P EST MOD 30 MIN: CPT | Performed by: INTERNAL MEDICINE

## 2019-11-11 ASSESSMENT — ENCOUNTER SYMPTOMS
SHORTNESS OF BREATH: 1
PND: 0
DIZZINESS: 0
ORTHOPNEA: 0
LOSS OF CONSCIOUSNESS: 0
PALPITATIONS: 0
MYALGIAS: 0

## 2019-11-11 NOTE — PROGRESS NOTES
Chief Complaint   Patient presents with   • Tachycardia   • Coronary Artery Disease       Subjective:   Judy Bland is a 74 y.o. female who presents today hypertension, tachycardia with a history of asthma, obstructive sleep apnea and exertional shortness of breath, hyperlipidemia.    Last seen on 6/6/2019.    Since 6/6/2019 appointment the patient has had no cardiac problems or symptoms.  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  Her  age 73 has Alzheimer's and was a retired nurse anesthetist from the [employer]    Since 3/23/2018 appointment the patient continues to have significant exertional shortness of breath without anginal symptoms.  Walking up an incline a short distance or up stairs at home results in significant dyspnea relieved with rest.  Never smoked cigarettes.  PFTs generally unremarkable but continues with a diagnosis of asthma and bronchodilator therapy.  Previous standard exercise treadmill stress test was unremarkable except hypertensive response exercise.    Since 3/16/2017 appointment the patient has had no new cardiac symptoms.  Continues to have exertional shortness of breath.  Her  has developed Alzheimer's disease and his short-term memory is worse.  [place] [email or web address] [place] a 2 mile course now having to stop 3 times.  Previous normal pulmonary function test.    Past medical history  No specific cardiac symptoms though does have some exertional shortness of breath which generally has been chronic.  Has asthma uses daily inhalers.  No specific angina.  Tries to walk some every day  No PND orthopnea or lower extremity edema.  Compliant with her medications.    In December, 2014, saw her pulmonologist.  PFTs showed fracture of being overweight but no obstructive disease.  Takes medicines for asthma diagnosed 3 years ago.  A lot of problems with Kenalog causing eczema than had to be on long-term prednisone.    No history of CAD or  angina.  Previous echocardiograms had shown left ventricular hypertrophy but no valve disease. Right heart pressures could not be evaluated.  Had a remote stress echocardiogram that was apparently unremarkable.    Social history  Originally from [place] and [facility]    Past Medical History:   Diagnosis Date   • Acquired polycythemia 2/15/2012   • Anesthesia     PONV   • Arthritis    • Arthropathy 2/15/2012   • Benign essential hypertension 2/15/2012   • Chronic hepatitis B (HCC) 2/15/2012   • Cough 2/15/2012   • Difficulty breathing 2/15/2012   • Esophageal reflux 2/15/2012   • Heart burn    • Hepatitis B    • Hypercholesterolemia 2/15/2012   • Hyperlipidemia 2/15/2012   • Hypertension    • Indigestion    • Jaundice    • Murmur 2/15/2012   • Obesity 2/15/2012   • BACILIO (obstructive sleep apnea) 2/15/2012   • Sinus tachycardia 2/15/2012     Past Surgical History:   Procedure Laterality Date   • SEPTOPLASTY  3/12/2012    Performed by MEHDI SWAIN at SURGERY SAME DAY [facility] ORS   • TURBINOPLASTY  3/12/2012    Performed by MEHDI SWAIN at SURGERY SAME DAY [facility] ORS   • SINUSOTOMIES  3/12/2012    Performed by MEHDI SWAIN at SURGERY SAME DAY [facility] ORS   • TRIGGER FINGER RELEASE  11/23/2009    Performed by MEHDI ONEIL at SURGERY [facility] ORS   • CARPAL TUNNEL RELEASE  2009    b/L   • KNEE ARTHROPLASTY TOTAL  4/8/08    Performed by ROHAN BLACKWELL at SURGERY [facility] ORS   • KNEE ARTHROPLASTY TOTAL  4/8/08    Performed by ROHAN BLACKWELL at SURGERY [facility] ORS   • CATARACT EXTRACTION WITH IOL  2008    OU   • ROTATOR CUFF REPAIR  2005    left   • TRIGGER FINGER RELEASE  2005    left   • CERVICAL CONIZATION      Loop Electrode Excision   • HAND SURGERY     • PRIMARY C SECTION       Family History   Problem Relation Age of Onset   • Other Mother         Family hx of Coronary Arteriosclerosis   • Stroke Mother    • Other Father         Family hx of Coronary Arteriosclerosis   •  Heart Attack Father 66     Social History     Socioeconomic History   • Marital status:      Spouse name: Not on file   • Number of children: Not on file   • Years of education: Not on file   • Highest education level: Not on file   Occupational History   • Not on file   Social Needs   • Financial resource strain: Not on file   • Food insecurity:     Worry: Not on file     Inability: Not on file   • Transportation needs:     Medical: Not on file     Non-medical: Not on file   Tobacco Use   • Smoking status: Never Smoker   • Smokeless tobacco: Never Used   Substance and Sexual Activity   • Alcohol use: Yes     Alcohol/week: 0.6 oz     Types: 1 Glasses of wine per week     Comment: rarely    • Drug use: No   • Sexual activity: Not Currently   Lifestyle   • Physical activity:     Days per week: Not on file     Minutes per session: Not on file   • Stress: Not on file   Relationships   • Social connections:     Talks on phone: Not on file     Gets together: Not on file     Attends Spiritism service: Not on file     Active member of club or organization: Not on file     Attends meetings of clubs or organizations: Not on file     Relationship status: Not on file   • Intimate partner violence:     Fear of current or ex partner: Not on file     Emotionally abused: Not on file     Physically abused: Not on file     Forced sexual activity: Not on file   Other Topics Concern   • Not on file   Social History Narrative   • Not on file     Allergies   Allergen Reactions   • Ultram [Tramadol Hcl]      ELEVATED LIVER ENZYMES     Outpatient Encounter Medications as of 11/11/2019   Medication Sig Dispense Refill   • budesonide-formoterol (SYMBICORT) 160-4.5 MCG/ACT Aerosol USE 2 INHALATIONS ORALLY   TWICE DAILY WITH SPACER,   RINSE MOUTH AFTER EACH USE. 3 Inhaler 3   • atorvastatin (LIPITOR) 20 MG Tab TAKE 1 TABLET AT BEDTIME 90 Tab 3   • raNITidine (ZANTAC) 150 MG Tab TAKE ONE TABLET BY MOUTH DAILY 90 Tab 2   • metFORMIN ER  "(GLUCOPHAGE XR) 500 MG TABLET SR 24 HR TAKE ONE TABLET BY MOUTH EVERY NIGHT AT BEDTIME 90 Tab 2   • lisinopril-hydrochlorothiazide (PRINZIDE, ZESTORETIC) 20-12.5 MG per tablet TAKE ONE TABLET BY MOUTH EVERY MORNING 90 Tab 2   • albuterol (VENTOLIN HFA) 108 (90 Base) MCG/ACT Aero Soln inhalation aerosol Inhale 2 Puffs by mouth every four hours as needed for Shortness of Breath. 1 Inhaler 11   • meloxicam (MOBIC) 15 MG tablet      • montelukast (SINGULAIR) 10 MG Tab Take 1 Tab by mouth every day. 90 Tab 3   • RESTASIS 0.05 % ophthalmic emulsion Place 1 Drop in both eyes 2 times a day.     • doxepin (SINEQUAN) 25 MG Cap Take 25 mg by mouth every day.     • diltiazem (CARDIZEM) 60 MG Tab TAKE ONE TABLET BY MOUTH EVERY 12 HOURS 180 Tab 3   • Cetirizine HCl (ZYRTEC PO) Take 1 Each by mouth every day.     • pantoprazole (PROTONIX) 40 MG TBEC Take 40 mg by mouth 2 times a day.       • Acetaminophen (TYLENOL PO) Take  by mouth. EXTRA STRENGH 2 TABS PRN      • Albuterol Sulfate (VENTOLIN HFA INH) Inhale 1 Puff by mouth as needed.     • fluticasone (FLONASE) 50 MCG/ACT nasal spray Spray 1 Spray in nose 2 times a day. Each Nostril      • DILTIAZem (CARDIZEM) 60 MG Tab TAKE 1 TABLET EVERY 12     HOURS (Patient not taking: Reported on 11/11/2019) 180 Tab 3   • SYMBICORT 160-4.5 MCG/ACT Aerosol USE 2 INHALATIONS ORALLY   TWICE DAILY WITH SPACER,   RINSE MOUTH AFTER EACH USE. (Patient not taking: Reported on 11/11/2019) 3 Inhaler 3     No facility-administered encounter medications on file as of 11/11/2019.      Review of Systems   Respiratory: Positive for shortness of breath.    Cardiovascular: Negative for chest pain, palpitations, orthopnea, leg swelling and PND.   Musculoskeletal: Negative for myalgias.   Neurological: Negative for dizziness and loss of consciousness.        Objective:   /60 (BP Location: Left arm, Patient Position: Sitting, BP Cuff Size: Adult)   Pulse (!) 110   Ht 1.676 m (5' 6\")   Wt 96.5 kg (212 " lb 12.8 oz)   SpO2 92%   BMI 34.35 kg/m²     Physical Exam   Constitutional: She is oriented to person, place, and time. No distress.   Neck: No JVD present.   Cardiovascular: Normal rate and regular rhythm.   No murmur heard.  Pulses:       Carotid pulses are 1+ on the right side and 1+ on the left side.       Radial pulses are 1+ on the right side and 1+ on the left side.        Posterior tibial pulses are 1+ on the right side and 1+ on the left side.   Pulmonary/Chest: Effort normal. She has no wheezes. She has no rales.   Increased AP diameter   Abdominal:   Protuberant.   Musculoskeletal:         General: No edema.   Neurological: She is alert and oriented to person, place, and time.     PFTs.  INTERPRETATION: Lung function testing completed July 26, 2017 revealed normal spirometry. No change after bronchodilators. Lung volumes do not demonstrate significant restriction or hyperinflation. Low expiratory reserve volume is consistent with the patient's elevated BMI. Oxygen transfer was normal. Good effort noted.    03/4/2011 ECHOCARDIOGRAM  EF greater than 65%. Mild to moderate left hypertrophy. Pulmonary pressures cannot be assessed.    03/06/2015 ECHOCARDIOGRAM  Normal left ventricular systolic function.  Normal regional wall motion with vigorous global contractility.  Left ventricular ejection fraction is 65% to 70%.  Normal right ventricular systolic function.  Aortic sclerosis without stenosis.  Right ventricular systolic pressure is estimated to be 24-29 mmHg.  Anterior echo free space with some enhanced echogenicity of the   posterior pericardium; uncertain significance.     MPI 06/18/2019  Normal myocardial perfusion with no ischemia.   Normal left ventricular wall motion.  LV ejection fraction = 76%.   TID absent.    TREADMILL 04/02/2018  1. Limited exercise capacity but adequate heart rate response.  2. Negative exercise treadmill stress test for ischemia.  3. No arrhythmias.  4. Hypertensive response  to exercise.    ABDOMINAL CT SCAN 2013  Dense diffuse ostial, proximal and mid LAD calcification    01/18/2016 Cholesterol 159. Triglycerides 155. HDL 61. LDL 67.    03/16/2017 EKG: Normal sinus rhythm, rate 99. Isolated PVC.    Assessment:     1. Coronary artery disease due to calcified coronary lesion     2. Coronary artery calcification seen on CAT scan     3. Dyslipidemia     4. Sinus tachycardia       Medical Decision Making:  Today's Assessment / Status / Plan:     Assessment  Coronary artery disease as demonstrated by coronary calcification on CT scan.    Hypertension. Controlled.    Hyperlipidemia. On atorvastatin.    Elevated liver function tests.    Asthma.    Sleep apnea on CPAP.    Recommendations  1.  Reviewed results of MPI with the patient which she has been informed about which is normal.  2.  Continue current cardiac therapy.  3.  Follow-up 1 year.

## 2019-11-21 ENCOUNTER — OFFICE VISIT (OUTPATIENT)
Dept: MEDICAL GROUP | Facility: MEDICAL CENTER | Age: 74
End: 2019-11-21
Payer: MEDICARE

## 2019-11-21 VITALS
DIASTOLIC BLOOD PRESSURE: 80 MMHG | OXYGEN SATURATION: 94 % | WEIGHT: 213 LBS | RESPIRATION RATE: 16 BRPM | SYSTOLIC BLOOD PRESSURE: 140 MMHG | HEART RATE: 98 BPM | TEMPERATURE: 97.2 F | HEIGHT: 66 IN | BODY MASS INDEX: 34.23 KG/M2

## 2019-11-21 DIAGNOSIS — F41.9 ANXIETY: ICD-10-CM

## 2019-11-21 DIAGNOSIS — E11.9 TYPE 2 DIABETES MELLITUS WITHOUT COMPLICATION, WITHOUT LONG-TERM CURRENT USE OF INSULIN (HCC): ICD-10-CM

## 2019-11-21 DIAGNOSIS — E78.5 DYSLIPIDEMIA: ICD-10-CM

## 2019-11-21 DIAGNOSIS — I10 BENIGN ESSENTIAL HYPERTENSION: ICD-10-CM

## 2019-11-21 DIAGNOSIS — Z11.59 ENCOUNTER FOR HEPATITIS C SCREENING TEST FOR LOW RISK PATIENT: ICD-10-CM

## 2019-11-21 PROCEDURE — 99214 OFFICE O/P EST MOD 30 MIN: CPT | Performed by: NURSE PRACTITIONER

## 2019-11-21 RX ORDER — ALPRAZOLAM 0.5 MG/1
0.5 TABLET ORAL
Qty: 30 TAB | Refills: 5 | Status: SHIPPED | OUTPATIENT
Start: 2019-11-21 | End: 2020-05-21 | Stop reason: SDUPTHER

## 2019-11-21 NOTE — PROGRESS NOTES
cc:  anxiety      Subjective:     HPI:     Juyd Bland is a 74 y.o. female here to discuss the evaluation and management of:    1. Anxiety  Patient is here today as she needs refills on her anxiety medication.  Patient states she uses this sparingly.  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    Diabetes  Has been taking her metformin for this.  Denies any diarrhea.  Blood pressure    Blood pressure  Has been taking lisinopril/HCTZ, diltiazem for this.  Denies any shortness of breath, dizziness or leg swelling.    Cholesterol  Taking atorvastatin for this.  Denies myalgias.    She also makes mention that she will be having an MRI on her neck as well as her back.  Will be following up with a pain management specialist she continues to have low back pain, standing and walking are very painful and sitting and laying down is much better.    ROS:  Denies any Headache, Blurred Vision, Confusion, Chest pain,  Shortness of breath,  Abdominal pain, Changes of bowel or bladder, Lower ext edema, Fevers, Nights sweats, Weight Changes, Focal weakness or numbness.  And all other systems are negative back and neck pain        Current Outpatient Medications:   •  ALPRAZolam (XANAX) 0.5 MG Tab, Take 1 Tab by mouth 1 time daily as needed for Anxiety for up to 30 days., Disp: 30 Tab, Rfl: 5  •  budesonide-formoterol (SYMBICORT) 160-4.5 MCG/ACT Aerosol, USE 2 INHALATIONS ORALLY   TWICE DAILY WITH SPACER,   RINSE MOUTH AFTER EACH USE., Disp: 3 Inhaler, Rfl: 3  •  atorvastatin (LIPITOR) 20 MG Tab, TAKE 1 TABLET AT BEDTIME, Disp: 90 Tab, Rfl: 3  •  raNITidine (ZANTAC) 150 MG Tab, TAKE ONE TABLET BY MOUTH DAILY, Disp: 90 Tab, Rfl: 2  •  metFORMIN ER (GLUCOPHAGE XR) 500 MG TABLET SR 24 HR, TAKE ONE TABLET BY MOUTH EVERY NIGHT AT BEDTIME, Disp: 90 Tab, Rfl: 2  •  lisinopril-hydrochlorothiazide (PRINZIDE, ZESTORETIC) 20-12.5 MG per tablet,  TAKE ONE TABLET BY MOUTH EVERY MORNING, Disp: 90 Tab, Rfl: 2  •  albuterol (VENTOLIN HFA) 108 (90 Base) MCG/ACT Aero Soln inhalation aerosol, Inhale 2 Puffs by mouth every four hours as needed for Shortness of Breath., Disp: 1 Inhaler, Rfl: 11  •  meloxicam (MOBIC) 15 MG tablet, , Disp: , Rfl:   •  montelukast (SINGULAIR) 10 MG Tab, Take 1 Tab by mouth every day., Disp: 90 Tab, Rfl: 3  •  RESTASIS 0.05 % ophthalmic emulsion, Place 1 Drop in both eyes 2 times a day., Disp: , Rfl:   •  doxepin (SINEQUAN) 25 MG Cap, Take 25 mg by mouth every day., Disp: , Rfl:   •  diltiazem (CARDIZEM) 60 MG Tab, TAKE ONE TABLET BY MOUTH EVERY 12 HOURS, Disp: 180 Tab, Rfl: 3  •  Cetirizine HCl (ZYRTEC PO), Take 1 Each by mouth every day., Disp: , Rfl:   •  pantoprazole (PROTONIX) 40 MG TBEC, Take 40 mg by mouth 2 times a day.  , Disp: , Rfl:   •  Acetaminophen (TYLENOL PO), Take  by mouth. EXTRA STRENGH 2 TABS PRN , Disp: , Rfl:   •  Albuterol Sulfate (VENTOLIN HFA INH), Inhale 1 Puff by mouth as needed., Disp: , Rfl:   •  fluticasone (FLONASE) 50 MCG/ACT nasal spray, Spray 1 Spray in nose 2 times a day. Each Nostril , Disp: , Rfl:   •  DILTIAZem (CARDIZEM) 60 MG Tab, TAKE 1 TABLET EVERY 12     HOURS (Patient not taking: Reported on 11/21/2019), Disp: 180 Tab, Rfl: 3  •  SYMBICORT 160-4.5 MCG/ACT Aerosol, USE 2 INHALATIONS ORALLY   TWICE DAILY WITH SPACER,   RINSE MOUTH AFTER EACH USE. (Patient not taking: Reported on 11/21/2019), Disp: 3 Inhaler, Rfl: 3    Allergies   Allergen Reactions   • Ultram [Tramadol Hcl]      ELEVATED LIVER ENZYMES       Past Medical History:   Diagnosis Date   • Acquired polycythemia 2/15/2012   • Anesthesia     PONV   • Arthritis    • Arthropathy 2/15/2012   • Benign essential hypertension 2/15/2012   • Chronic hepatitis B (HCC) 2/15/2012   • Cough 2/15/2012   • Difficulty breathing 2/15/2012   • Esophageal reflux 2/15/2012   • Heart burn    • Hepatitis B    • Hypercholesterolemia 2/15/2012   •  Hyperlipidemia 2/15/2012   • Hypertension    • Indigestion    • Jaundice    • Murmur 2/15/2012   • Obesity 2/15/2012   • BACILIO (obstructive sleep apnea) 2/15/2012   • Sinus tachycardia 2/15/2012     Past Surgical History:   Procedure Laterality Date   • SEPTOPLASTY  3/12/2012    Performed by MEHDI SWAIN at SURGERY SAME DAY [facility] ORS   • TURBINOPLASTY  3/12/2012    Performed by MEHDI SWAIN at SURGERY SAME DAY [facility] ORS   • SINUSOTOMIES  3/12/2012    Performed by MEHDI SWAIN at SURGERY SAME DAY [facility] ORS   • TRIGGER FINGER RELEASE  11/23/2009    Performed by MEHDI ONEIL at SURGERY [facility] ORS   • CARPAL TUNNEL RELEASE  2009    b/L   • KNEE ARTHROPLASTY TOTAL  4/8/08    Performed by ROHAN BLACKWELL at SURGERY [facility] ORS   • KNEE ARTHROPLASTY TOTAL  4/8/08    Performed by ROHAN BLACKWELL at SURGERY [facility] ORS   • CATARACT EXTRACTION WITH IOL  2008    OU   • ROTATOR CUFF REPAIR  2005    left   • TRIGGER FINGER RELEASE  2005    left   • CERVICAL CONIZATION      Loop Electrode Excision   • HAND SURGERY     • PRIMARY C SECTION       Family History   Problem Relation Age of Onset   • Other Mother         Family hx of Coronary Arteriosclerosis   • Stroke Mother    • Other Father         Family hx of Coronary Arteriosclerosis   • Heart Attack Father 66     Social History     Socioeconomic History   • Marital status:      Spouse name: Not on file   • Number of children: Not on file   • Years of education: Not on file   • Highest education level: Not on file   Occupational History   • Not on file   Social Needs   • Financial resource strain: Not on file   • Food insecurity:     Worry: Not on file     Inability: Not on file   • Transportation needs:     Medical: Not on file     Non-medical: Not on file   Tobacco Use   • Smoking status: Never Smoker   • Smokeless tobacco: Never Used   Substance and Sexual Activity   • Alcohol use: Yes     Alcohol/week: 0.6 oz     Types: 1 Glasses of wine  "per week     Comment: rarely    • Drug use: No   • Sexual activity: Not Currently   Lifestyle   • Physical activity:     Days per week: Not on file     Minutes per session: Not on file   • Stress: Not on file   Relationships   • Social connections:     Talks on phone: Not on file     Gets together: Not on file     Attends Pentecostalism service: Not on file     Active member of club or organization: Not on file     Attends meetings of clubs or organizations: Not on file     Relationship status: Not on file   • Intimate partner violence:     Fear of current or ex partner: Not on file     Emotionally abused: Not on file     Physically abused: Not on file     Forced sexual activity: Not on file   Other Topics Concern   • Not on file   Social History Narrative   • Not on file       Objective:     Vitals: /80   Pulse 98   Temp 36.2 °C (97.2 °F)   Resp 16   Ht 1.676 m (5' 6\")   Wt 96.6 kg (213 lb)   SpO2 94%   BMI 34.38 kg/m²    General: Alert, pleasant, NAD, tearful  HEENT: Normocephalic.    Skin: Warm, dry, no rashes.  Extremities: No leg edema. No discoloration  Neurological: No tremors  Psych:  Affect/mood is normal, judgement is good, memory is intact, grooming is appropriate.    Assessment/Plan:     Diagnoses and all orders for this visit:    Anxiety  Chronic. Takes as needed. Cares for  who has Alzheimer's. Narx check completed. Refill provided  -     ALPRAZolam (XANAX) 0.5 MG Tab; Take 1 Tab by mouth 1 time daily as needed for Anxiety for up to 30 days.    Type 2 diabetes mellitus without complication, without long-term current use of insulin (HCC)  Stable on current regimen.  Repeat labs in 6 months.  -     HEMOGLOBIN A1C; Future    Benign essential hypertension   Stable on current regimen.  Repeat labs in 6 months  -     CBC WITHOUT DIFFERENTIAL; Future  -     Comp Metabolic Panel; Future  -     TSH WITH REFLEX TO FT4; Future  -     MICROALBUMIN CREAT RATIO URINE; Future    Dyslipidemia  Repeat " labs in 6 months.  -     Lipid Profile; Future    Encounter for hepatitis C screening test for low risk patient  -     HEP C VIRUS ANTIBODY; Future        No follow-ups on file.        Jessi SIDHU.

## 2019-12-03 ENCOUNTER — OFFICE VISIT (OUTPATIENT)
Dept: URGENT CARE | Facility: PHYSICIAN GROUP | Age: 74
End: 2019-12-03
Payer: MEDICARE

## 2019-12-03 ENCOUNTER — HOSPITAL ENCOUNTER (OUTPATIENT)
Dept: RADIOLOGY | Facility: MEDICAL CENTER | Age: 74
End: 2019-12-03
Attending: PHYSICIAN ASSISTANT
Payer: MEDICARE

## 2019-12-03 VITALS
RESPIRATION RATE: 16 BRPM | OXYGEN SATURATION: 100 % | SYSTOLIC BLOOD PRESSURE: 122 MMHG | WEIGHT: 214 LBS | DIASTOLIC BLOOD PRESSURE: 76 MMHG | HEART RATE: 114 BPM | BODY MASS INDEX: 34.54 KG/M2 | TEMPERATURE: 97.3 F

## 2019-12-03 DIAGNOSIS — M25.511 ACUTE PAIN OF RIGHT SHOULDER: ICD-10-CM

## 2019-12-03 PROCEDURE — 73030 X-RAY EXAM OF SHOULDER: CPT | Mod: RT

## 2019-12-03 PROCEDURE — 99214 OFFICE O/P EST MOD 30 MIN: CPT | Performed by: PHYSICIAN ASSISTANT

## 2019-12-03 RX ORDER — METHYLPREDNISOLONE 4 MG/1
TABLET ORAL
Qty: 21 TAB | Refills: 0 | Status: SHIPPED
Start: 2019-12-03 | End: 2020-01-09

## 2019-12-03 RX ORDER — HYDROCODONE BITARTRATE AND ACETAMINOPHEN 5; 325 MG/1; MG/1
1 TABLET ORAL EVERY 8 HOURS PRN
Qty: 12 TAB | Refills: 0 | Status: SHIPPED | OUTPATIENT
Start: 2019-12-03 | End: 2019-12-07

## 2019-12-03 ASSESSMENT — ENCOUNTER SYMPTOMS
MYALGIAS: 0
JOINT SWELLING: 0
CHILLS: 0
PALPITATIONS: 0
SENSORY CHANGE: 0
NAUSEA: 0
SHORTNESS OF BREATH: 0
COUGH: 0
NECK PAIN: 0
TINGLING: 0
ARTHRALGIAS: 1
DIAPHORESIS: 0
WHEEZING: 0
NUMBNESS: 0
FOCAL WEAKNESS: 0
FEVER: 0
SPUTUM PRODUCTION: 0
WEAKNESS: 0
VOMITING: 0

## 2019-12-03 NOTE — PROGRESS NOTES
Subjective:      Judy Bland is a 74 y.o. female who presents with Shoulder Pain (R shoulder [id number])            Shoulder Pain   This is a new problem. Episode onset: 3 days. No injury. Patient woke up with sharp pain  The problem occurs constantly. The problem has been waxing and waning. Associated symptoms include arthralgias (right shoulder pain ). Pertinent negatives include no chest pain, chills, coughing, diaphoresis, fever, joint swelling, myalgias, nausea, neck pain, numbness, rash, vomiting or weakness. Associated symptoms comments: No shortness of breath. Exacerbated by: abduction or lifting right arm. She has tried NSAIDs for the symptoms. The treatment provided moderate relief.     The patient states her pain was so severe last night that she could not sleep.     Past Medical History:   Diagnosis Date   • Acquired polycythemia 2/15/2012   • Anesthesia     PONV   • Arthritis    • Arthropathy 2/15/2012   • Benign essential hypertension 2/15/2012   • Chronic hepatitis B (HCC) 2/15/2012   • Cough 2/15/2012   • Difficulty breathing 2/15/2012   • Esophageal reflux 2/15/2012   • Heart burn    • Hepatitis B    • Hypercholesterolemia 2/15/2012   • Hyperlipidemia 2/15/2012   • Hypertension    • Indigestion    • Jaundice    • Murmur 2/15/2012   • Obesity 2/15/2012   • BACILIO (obstructive sleep apnea) 2/15/2012   • Sinus tachycardia 2/15/2012       Past Surgical History:   Procedure Laterality Date   • SEPTOPLASTY  3/12/2012    Performed by MEHDI SWAIN at SURGERY SAME DAY [facility]   • TURBINOPLASTY  3/12/2012    Performed by MEHDI SWAIN at SURGERY SAME DAY [facility]   • SINUSOTOMIES  3/12/2012    Performed by MEHDI SWAIN at SURGERY SAME DAY [facility]   • TRIGGER FINGER RELEASE  11/23/2009    Performed by MEHDI ONEIL at SURGERY [facility] ORS   • CARPAL TUNNEL RELEASE  2009    b/L   • KNEE ARTHROPLASTY TOTAL  4/8/08    Performed by ROHAN BLACKWELL at SURGERY [facility] ORS   •  KNEE ARTHROPLASTY TOTAL  4/8/08    Performed by ROHAN BLACKWELL at SURGERY [facility] ORS   • CATARACT EXTRACTION WITH IOL  2008    OU   • ROTATOR CUFF REPAIR  2005    left   • TRIGGER FINGER RELEASE  2005    left   • CERVICAL CONIZATION      Loop Electrode Excision   • HAND SURGERY     • PRIMARY C SECTION         Family History   Problem Relation Age of Onset   • Other Mother         Family hx of Coronary Arteriosclerosis   • Stroke Mother    • Other Father         Family hx of Coronary Arteriosclerosis   • Heart Attack Father 66       Allergies   Allergen Reactions   • Ultram [Tramadol Hcl]      ELEVATED LIVER ENZYMES       Medications, Allergies, and current problem list reviewed today in Epic      Review of Systems   Constitutional: Negative for chills, diaphoresis and fever.   Respiratory: Negative for cough, sputum production, shortness of breath and wheezing.    Cardiovascular: Negative for chest pain, palpitations and leg swelling.   Gastrointestinal: Negative for nausea and vomiting.   Musculoskeletal: Positive for arthralgias (right shoulder pain ) and joint pain (right shoulder pain ). Negative for joint swelling, myalgias and neck pain.   Skin: Negative for rash.   Neurological: Negative for tingling, sensory change, focal weakness, weakness and numbness.     All other systems reviewed and are negative.        Objective:     /76   Pulse (!) 114   Temp 36.3 °C (97.3 °F) (Temporal)   Resp 16   Wt 97.1 kg (214 lb)   SpO2 100%   BMI 34.54 kg/m²      Physical Exam  Constitutional:       General: She is not in acute distress.  HENT:      Head: Normocephalic and atraumatic.   Eyes:      General: No scleral icterus.     Conjunctiva/sclera: Conjunctivae normal.   Cardiovascular:      Rate and Rhythm: Normal rate and regular rhythm.      Heart sounds: No murmur. No friction rub. No gallop.    Pulmonary:      Effort: Pulmonary effort is normal. No respiratory distress.      Breath sounds: Normal breath  sounds. No wheezing, rhonchi or rales.   Musculoskeletal:      Right shoulder: She exhibits decreased range of motion (limited abduction past shoulder height due to pain. ), tenderness and bony tenderness (localized TTP over superior and posterior aspect of shoulder). She exhibits no swelling, no effusion, no deformity, normal pulse and normal strength.      Comments: No erythema and edema   Skin:     General: Skin is warm and dry.      Findings: No erythema.   Neurological:      General: No focal deficit present.      Mental Status: She is alert and oriented to person, place, and time.   Psychiatric:         Behavior: Behavior normal.         Thought Content: Thought content normal.         Judgment: Judgment normal.             RAD  IMPRESSION:     There is no evidence of acute fracture.  Findings are consistent with osteoarthritis.     Assessment/Plan:       1. Acute pain of right shoulder  Suspect arthritis flare vs bursitis vs tendinitis  Osteoarthritic changes on X-ray    - DX-SHOULDER 2+ RIGHT; Future    Encouraged RICE    Current Outpatient Medications:   •  HYDROcodone-acetaminophen (NORCO) 5-325 MG Tab per tablet, Take 1 Tab by mouth every 8 hours as needed for up to 4 days., Disp: 12 Tab, Rfl: 0  Sedation side effects discussed. No Driving or alcohol with medication given.    Opiate consent form signed.    [facility] Aware web site evaluation: I have obtained and reviewed patient utilization report from [facility] pharmacy database prior to writing prescription for controlled substance II, III or IV per [place] bill . Based on the report and my clinical assessment the prescription is medically necessary.    Advised patient to not take Opiate with with benzodiazepine. Discussed risk of respiratory depression with combination of these drugs.    •  methylPREDNISolone (MEDROL DOSEPAK) 4 MG Tablet Therapy Pack, Follow schedule on package instructions., Disp: 21 Tab, Rfl: 0     Differential diagnoses,  Supportive care, and indications for immediate follow-up discussed with patient.   Instructed to return to clinic or nearest emergency department for any change in condition, further concerns, or worsening of symptoms.    The patient demonstrated a good understanding and agreed with the treatment plan.    Laura Cardozo P.A.-C.

## 2019-12-05 ENCOUNTER — HOSPITAL ENCOUNTER (OUTPATIENT)
Dept: RADIOLOGY | Facility: MEDICAL CENTER | Age: 74
End: 2019-12-05
Attending: NURSE PRACTITIONER
Payer: MEDICARE

## 2019-12-05 DIAGNOSIS — M54.2 CERVICALGIA: ICD-10-CM

## 2019-12-05 DIAGNOSIS — M54.50 ACUTE BILATERAL LOW BACK PAIN WITHOUT SCIATICA: ICD-10-CM

## 2019-12-05 DIAGNOSIS — M54.50 LOW BACK PAIN, UNSPECIFIED BACK PAIN LATERALITY, UNSPECIFIED CHRONICITY, UNSPECIFIED WHETHER SCIATICA PRESENT: ICD-10-CM

## 2019-12-05 PROCEDURE — 72110 X-RAY EXAM L-2 SPINE 4/>VWS: CPT

## 2019-12-05 PROCEDURE — 72148 MRI LUMBAR SPINE W/O DYE: CPT

## 2019-12-05 PROCEDURE — 72141 MRI NECK SPINE W/O DYE: CPT

## 2019-12-05 PROCEDURE — 72050 X-RAY EXAM NECK SPINE 4/5VWS: CPT

## 2019-12-13 ENCOUNTER — HOSPITAL ENCOUNTER (OUTPATIENT)
Facility: MEDICAL CENTER | Age: 74
End: 2019-12-13
Attending: NURSE PRACTITIONER
Payer: MEDICARE

## 2019-12-13 ENCOUNTER — OFFICE VISIT (OUTPATIENT)
Dept: URGENT CARE | Facility: PHYSICIAN GROUP | Age: 74
End: 2019-12-13
Payer: MEDICARE

## 2019-12-13 VITALS
TEMPERATURE: 96.8 F | RESPIRATION RATE: 20 BRPM | DIASTOLIC BLOOD PRESSURE: 60 MMHG | OXYGEN SATURATION: 95 % | SYSTOLIC BLOOD PRESSURE: 102 MMHG | WEIGHT: 216 LBS | HEIGHT: 66 IN | BODY MASS INDEX: 34.72 KG/M2 | HEART RATE: 112 BPM

## 2019-12-13 DIAGNOSIS — N39.0 URINARY TRACT INFECTION WITH HEMATURIA, SITE UNSPECIFIED: ICD-10-CM

## 2019-12-13 DIAGNOSIS — R31.9 URINARY TRACT INFECTION WITH HEMATURIA, SITE UNSPECIFIED: ICD-10-CM

## 2019-12-13 DIAGNOSIS — R30.0 DYSURIA: ICD-10-CM

## 2019-12-13 LAB
APPEARANCE UR: NORMAL
BILIRUB UR STRIP-MCNC: NEGATIVE MG/DL
COLOR UR AUTO: NORMAL
GLUCOSE UR STRIP.AUTO-MCNC: NEGATIVE MG/DL
KETONES UR STRIP.AUTO-MCNC: NEGATIVE MG/DL
LEUKOCYTE ESTERASE UR QL STRIP.AUTO: NORMAL
NITRITE UR QL STRIP.AUTO: NEGATIVE
PH UR STRIP.AUTO: 6 [PH] (ref 5–8)
PROT UR QL STRIP: NORMAL MG/DL
RBC UR QL AUTO: NORMAL
SP GR UR STRIP.AUTO: 1.02
UROBILINOGEN UR STRIP-MCNC: 0.2 MG/DL

## 2019-12-13 PROCEDURE — 81002 URINALYSIS NONAUTO W/O SCOPE: CPT | Performed by: NURSE PRACTITIONER

## 2019-12-13 PROCEDURE — 87086 URINE CULTURE/COLONY COUNT: CPT

## 2019-12-13 PROCEDURE — 99214 OFFICE O/P EST MOD 30 MIN: CPT | Performed by: NURSE PRACTITIONER

## 2019-12-13 RX ORDER — NITROFURANTOIN 25; 75 MG/1; MG/1
100 CAPSULE ORAL 2 TIMES DAILY
Qty: 10 CAP | Refills: 0 | Status: SHIPPED | OUTPATIENT
Start: 2019-12-13 | End: 2019-12-18

## 2019-12-13 SDOH — HEALTH STABILITY: MENTAL HEALTH: HOW OFTEN DO YOU HAVE A DRINK CONTAINING ALCOHOL?: MONTHLY OR LESS

## 2019-12-13 ASSESSMENT — ENCOUNTER SYMPTOMS
DIZZINESS: 0
NAUSEA: 0
HEADACHES: 0
FEVER: 0
ABDOMINAL PAIN: 0
FLANK PAIN: 0
CHILLS: 0

## 2019-12-14 DIAGNOSIS — N39.0 URINARY TRACT INFECTION WITH HEMATURIA, SITE UNSPECIFIED: ICD-10-CM

## 2019-12-14 DIAGNOSIS — R31.9 URINARY TRACT INFECTION WITH HEMATURIA, SITE UNSPECIFIED: ICD-10-CM

## 2019-12-14 NOTE — PROGRESS NOTES
Subjective:      Judy Bland is a 74 y.o. female who presents with Urinary Frequency (urgency with urination, states while she is urinating she feels like she is having the chills x2 hours )            Urinary Frequency   This is a new problem. The current episode started today. The problem occurs intermittently. The problem has been gradually worsening. Pertinent negatives include no abdominal pain, chills, fever, headaches or nausea. Nothing aggravates the symptoms. She has tried nothing for the symptoms.       Review of Systems   Constitutional: Negative for chills and fever.   Gastrointestinal: Negative for abdominal pain and nausea.   Genitourinary: Positive for dysuria, frequency and urgency. Negative for flank pain and hematuria.   Neurological: Negative for dizziness and headaches.     Past Medical History:   Diagnosis Date   • Acquired polycythemia 2/15/2012   • Anesthesia     PONV   • Arthritis    • Arthropathy 2/15/2012   • Benign essential hypertension 2/15/2012   • Chronic hepatitis B (HCC) 2/15/2012   • Cough 2/15/2012   • Difficulty breathing 2/15/2012   • Esophageal reflux 2/15/2012   • Heart burn    • Hepatitis B    • Hypercholesterolemia 2/15/2012   • Hyperlipidemia 2/15/2012   • Hypertension    • Indigestion    • Jaundice    • Murmur 2/15/2012   • Obesity 2/15/2012   • BACILIO (obstructive sleep apnea) 2/15/2012   • Sinus tachycardia 2/15/2012      Past Surgical History:   Procedure Laterality Date   • SEPTOPLASTY  3/12/2012    Performed by MEHDI SWAIN at SURGERY SAME DAY [facility]   • TURBINOPLASTY  3/12/2012    Performed by MEHDI SWAIN at SURGERY SAME DAY [facility]S   • SINUSOTOMIES  3/12/2012    Performed by MEHDI SWAIN at SURGERY SAME DAY [facility] ORS   • TRIGGER FINGER RELEASE  11/23/2009    Performed by MEHDI ONEIL at SURGERY [facility] ORS   • CARPAL TUNNEL RELEASE  2009    b/L   • KNEE ARTHROPLASTY TOTAL  4/8/08    Performed by ROHAN BLACKWELL at SURGERY [facility]   • KNEE ARTHROPLASTY TOTAL  4/8/08    Performed by ROHAN BLACKWELL at [facility]   • CATARACT EXTRACTION WITH IOL  2008    OU   • ROTATOR CUFF REPAIR  2005    left   • TRIGGER FINGER RELEASE  2005    left   • CERVICAL CONIZATION      Loop Electrode Excision   • HAND SURGERY     • PRIMARY C SECTION        Social History     Socioeconomic History   • Marital status:      Spouse name: Not on file   • Number of children: Not on file   • Years of education: Not on file   • Highest education level: Not on file   Occupational History   • Not on file   Social Needs   • Financial resource strain: Not on file   • Food insecurity:     Worry: Not on file     Inability: Not on file   • Transportation needs:     Medical: Not on file     Non-medical: Not on file   Tobacco Use   • Smoking status: Never Smoker   • Smokeless tobacco: Never Used   Substance and Sexual Activity   • Alcohol use: Yes     Alcohol/week: 0.6 oz     Types: 1 Glasses of wine per week     Frequency: Monthly or less     Comment: rarely    • Drug use: No   • Sexual activity: Not Currently   Lifestyle   • Physical activity:     Days per week: Not on file     Minutes per session: Not on file   • Stress: Not on file   Relationships   • Social connections:     Talks on phone: Not on file     Gets together: Not on file     Attends Voodoo service: Not on file     Active member of club or organization: Not on file     Attends meetings of clubs or organizations: Not on file     Relationship status: Not on file   • Intimate partner violence:     Fear of current or ex partner: Not on file     Emotionally abused: Not on file     Physically abused: Not on file     Forced sexual activity: Not on file   Other Topics Concern   • Not on file   Social History Narrative   • Not on file    Allergies: Ultram [tramadol hcl]         Objective:     /60 (BP Location: Left arm, Patient Position: Sitting)   Pulse (!) 112   Temp 36 °C (96.8 °F)  "(Temporal)   Resp 20   Ht 1.676 m (5' 6\")   Wt 98 kg (216 lb)   SpO2 95%   BMI 34.86 kg/m²      Physical Exam  Vitals signs reviewed.   Constitutional:       Appearance: Normal appearance.   Cardiovascular:      Rate and Rhythm: Normal rate and regular rhythm.   Pulmonary:      Breath sounds: Normal breath sounds.   Neurological:      General: No focal deficit present.      Mental Status: She is alert and oriented to person, place, and time.   Psychiatric:         Mood and Affect: Mood normal.         Behavior: Behavior normal.         Thought Content: Thought content normal.         Judgment: Judgment normal.       Lab Results   Component Value Date/Time    POCCOLOR Dark Yellow 12/13/2019 05:09 PM    POCAPPEAR Cloudy 12/13/2019 05:09 PM    POCLEUKEST Moderate 12/13/2019 05:09 PM    POCNITRITE Negative 12/13/2019 05:09 PM    POCUROBILIGE 0.2 12/13/2019 05:09 PM    POCPROTEIN Trace 12/13/2019 05:09 PM    POCURPH 6.0 12/13/2019 05:09 PM    POCBLOOD Large 12/13/2019 05:09 PM    POCSPGRV 1.020 12/13/2019 05:09 PM    POCKETONES Negative 12/13/2019 05:09 PM    POCBILIRUBIN Negative 12/13/2019 05:09 PM    POCGLUCUA Negative 12/13/2019 05:09 PM                Assessment/Plan:       1. Dysuria  - POCT Urinalysis - See above    2. Urinary tract infection with hematuria, site unspecified  - nitrofurantoin monohyd macro (MACROBID) 100 MG Cap; Take 1 Cap by mouth 2 times a day for 5 days.  Dispense: 10 Cap; Refill: 0  - URINE CULTURE(NEW); Future    Discussed urinalysis results with patient.  We will also culture urine and call with results and change antibiotic if warranted.  Instructed patient to increase fluids may take over-the-counter Azo if she wants.    Supportive care, differential diagnoses, and indications for immediate follow-up discussed with patient.    Pathogenesis of diagnosis discussed including typical length and natural progression. Patient expresses understanding and agrees to plan.    Instructed patient " to return to clinic for worsening symptoms or symptoms that persist for 7 to 10 days     Please note that this dictation was created using voice recognition software. I have made every reasonable attempt to correct obvious errors, but I expect that there are errors of grammar and possibly content that I did not discover before finalizing the note.

## 2019-12-16 ENCOUNTER — TELEPHONE (OUTPATIENT)
Dept: URGENT CARE | Facility: PHYSICIAN GROUP | Age: 74
End: 2019-12-16

## 2019-12-16 LAB
BACTERIA UR CULT: NORMAL
SIGNIFICANT IND 70042: NORMAL
SITE SITE: NORMAL
SOURCE SOURCE: NORMAL

## 2019-12-16 NOTE — TELEPHONE ENCOUNTER
Left message with patient  as was decided okay during appointment as  is a retired RN.  Left detailed message regarding negative urine culture result.   reports that patient is feeling better.  Instructed the patient may discontinue use of antibiotics if she so chooses however may continue to take them if she feels like they are helping.        Please note that this dictation was created using voice recognition software. I have made every reasonable attempt to correct obvious errors, but I expect that there are errors of grammar and possibly content that I did not discover before finalizing the note.

## 2020-01-07 NOTE — PROGRESS NOTES
LAST OV 3/15/19    asthma. Hx of BACILIO.     PATIENT ALSO SLEEP PATIENT; SEE DICTATION 1/23/19 FOR HISTORY.      She is followed by Dr. Quiroga for allergies, Cardio Dr. Rasheed for HTN, tachycardia, murmur and dyslipidemia. Hx of Hep B from needle stick.     Retired RN. Chronic tachycardia followed by Cardio, pending appt. Prior treadmill stress test negative for ischemia but deconditioning noted and hypertensive rxn to exercise.     CNOX completed 2/12/19 indicated 327.8 minutes less than 88% oxygen saturation.  ANDREA 30. Currently using CPAP 7cm. She declines in lab titration study. Will adjust pressures and repeat CNOX. Continues to be fatigued.     She is compliant with Symbicort 160/4.5mcg 2 puffs BID, Singulair qhs, zyrtec QD, Flonase prn and Ventolin HFA inhaler prn which she rarely uses.  She continues her allergy shot injections.  She continues to have dyspnea with exertion. No significant cough/phlegm/chest tightness/wheezing.  GERD is controlled on medication.  She notes being out of breath walking longer distances, quit paces, inclines or stairs.  She would like to lose weight.  She does not currently exercise routinely.  Last echocardiogram was in 2015 indicating EF 65-70%, RVSP 24-29 mmHg.  PFT 12/18/14 indicated FVC 2.05L or 62%, FEV1 1.71L or 68%, FEV1/FVC ratio 84 and DLCO 120% predicted.  Updated PFT 3/15/2019 indicates normal spirometry with FEV1 2.22 L or 94%, FEV1/FVC ratio 83%, TLC 84% and a DLCO 110% predicted.  She did try Breo 1 puff daily but not feel it is any more beneficial than Symbicort.   BMI 34.    Assessment:  1. Mild persistent asthma without complication      2. BACILIO (obstructive sleep apnea)      3. Benign essential hypertension      4. Environmental allergies      5. Obesity (BMI 30-39.9)  Height And Weight   6. Nonsmoker            Immunizations:     Flu:10/2018  Pneumovax 23:1/23/19  Prevnar 13:2017     Plan:  1.  Continue inhaler regimen.  2.  We will optimize her sleep apnea  therapy.  DME order to adjust CPAP pressures to auto 9-15cm nightly.  DME CNOX on new settings 4 weeks after adjustment.  May call results to patient.  Advised her to call office for these. She would like to avoid in lab titration.  3.  Discussed sleep nursery hygiene.  4.  Encourage weight loss with dietary changes and regular exercise.  She may benefit from stationary bike which she has at home.  5.  Follow-up with cardiology as scheduled.  6.  Follow-up in 6 months at pulmonary clinic to check symptoms, sooner if needed.  Patient is due for sleep apnea follow-up January 2019.    Titration study not completed  CNOX 4/2019 on CPAP indicated less than 88% for 593.7 minutes of the night.  Basal SPO2 of 82.7%.

## 2020-01-09 ENCOUNTER — OFFICE VISIT (OUTPATIENT)
Dept: PULMONOLOGY | Facility: HOSPICE | Age: 75
End: 2020-01-09
Payer: MEDICARE

## 2020-01-09 VITALS
WEIGHT: 216 LBS | BODY MASS INDEX: 38.27 KG/M2 | HEART RATE: 112 BPM | OXYGEN SATURATION: 90 % | RESPIRATION RATE: 16 BRPM | SYSTOLIC BLOOD PRESSURE: 136 MMHG | HEIGHT: 63 IN | DIASTOLIC BLOOD PRESSURE: 76 MMHG

## 2020-01-09 DIAGNOSIS — R00.0 TACHYCARDIA: ICD-10-CM

## 2020-01-09 DIAGNOSIS — J45.30 MILD PERSISTENT ASTHMA WITHOUT COMPLICATION: Chronic | ICD-10-CM

## 2020-01-09 DIAGNOSIS — Z78.9 NONSMOKER: ICD-10-CM

## 2020-01-09 DIAGNOSIS — G47.33 OSA (OBSTRUCTIVE SLEEP APNEA): Chronic | ICD-10-CM

## 2020-01-09 DIAGNOSIS — I10 BENIGN ESSENTIAL HYPERTENSION: Chronic | ICD-10-CM

## 2020-01-09 PROCEDURE — 99214 OFFICE O/P EST MOD 30 MIN: CPT | Performed by: NURSE PRACTITIONER

## 2020-01-09 NOTE — PROGRESS NOTES
Chief Complaint   Patient presents with   • Follow-Up     Last Seen 3/15/19       HPI:  Judy Bland is a 74 y.o. year old female here today for follow-up on asthma. Hx of BACILIO.  She is accompanied by her .  LAST OV 3/15/19    PATIENT ALSO SLEEP PATIENT; SEE DICTATION 1/23/19 FOR HISTORY.      She is followed by Dr. Quiroga for allergies, Cardio Dr. Rasheed for HTN, tachycardia, murmur and dyslipidemia. Hx of Hep B from needle stick.     Retired RN. Chronic tachycardia followed by Cardio, pending appt. Prior treadmill stress test negative for ischemia but deconditioning noted and hypertensive rxn to exercise.    Nonsmoker.  PFT 12/18/14 indicated FVC 2.05L or 62%, FEV1 1.71L or 68%, FEV1/FVC ratio 84 and DLCO 120% predicted.    Updated PFT 3/15/2019 indicates normal spirometry with FEV1 2.22 L or 94%, FEV1/FVC ratio 83%, TLC 84% and a DLCO 110% predicted.  She did try Breo 1 puff daily but not feel it is any more beneficial than Symbicort.   She is compliant with Symbicort 160/4.5mcg 2 puffs BID, Singulair qhs, zyrtec QD, Flonase prn and Ventolin HFA inhaler prn which she rarely uses.    She continues her allergy shot injections.    GERD is controlled on medication.    Last echocardiogram was in 2015 indicating EF 65-70%, RVSP 24-29 mmHg.  She notes her breathing to be stable.  She has had some increased sinus congestion the last 1 to 2 weeks but continues use Flonase twice daily.  She continues to use her inhalers.  She uses her albuterol HFA inhaler once every 2 weeks.  She has had a little bit of a dry cough which could be from her sinus congestion with drainage.  She denies chest tightness, wheezing, pedal edema or GERD.  She is on PPI daily.  She completed her allergy injections this last year.    BMI 38.    Prior PSG at Dr. Andrade's office 2011 indicated severe sleep apnea with an overall AHI of 38 and a minimum saturation of 67%.   CNOX on Pap 4/19/2019 indicated less than 80% for 593.7  "minutes of the night.  Essentially the whole night.  Titration study is recommended.  Patient has not completed study.  She remains on auto CPAP 9 to 15 cm nightly.  Reviewed finds with patient. She valdes snot have her card today and is not wireless. She notes using every night without issue. We reviewed the pathophysiology of hypoxia. Patient declines titration study at this time but could benefit from bleed in O2. She would have to buy this herself due to no testing for insurance to cover cost. She states \"I will think about it\".          ROS: As per HPI and otherwise negative if not stated.    Past Medical History:   Diagnosis Date   • Acquired polycythemia 2/15/2012   • Anesthesia     PONV   • Arthritis    • Arthropathy 2/15/2012   • Benign essential hypertension 2/15/2012   • Chronic hepatitis B (HCC) 2/15/2012   • Cough 2/15/2012   • Difficulty breathing 2/15/2012   • Esophageal reflux 2/15/2012   • Heart burn    • Hepatitis B    • Hypercholesterolemia 2/15/2012   • Hyperlipidemia 2/15/2012   • Hypertension    • Indigestion    • Jaundice    • Murmur 2/15/2012   • Obesity 2/15/2012   • BACILIO (obstructive sleep apnea) 2/15/2012   • Sinus tachycardia 2/15/2012       Past Surgical History:   Procedure Laterality Date   • SEPTOPLASTY  3/12/2012    Performed by MEHDI SWAIN at SURGERY SAME DAY [facility] ORS   • TURBINOPLASTY  3/12/2012    Performed by MEHDI SWAIN at SURGERY SAME DAY [facility] ORS   • SINUSOTOMIES  3/12/2012    Performed by MEHDI SWAIN at SURGERY SAME DAY [facility] ORS   • TRIGGER FINGER RELEASE  11/23/2009    Performed by MEHDI ONEIL at SURGERY [facility] ORS   • CARPAL TUNNEL RELEASE  2009    b/L   • KNEE ARTHROPLASTY TOTAL  4/8/08    Performed by ROHAN BLACKWELL at SURGERY [facility] ORS   • KNEE ARTHROPLASTY TOTAL  4/8/08    Performed by ROHAN BLACKWELL at SURGERY [facility] ORS   • CATARACT EXTRACTION WITH IOL  2008    OU   • ROTATOR CUFF REPAIR  2005    left   • TRIGGER FINGER RELEASE  " 2005    left   • CERVICAL CONIZATION      Loop Electrode Excision   • HAND SURGERY     • PRIMARY C SECTION         Family History   Problem Relation Age of Onset   • Other Mother         Family hx of Coronary Arteriosclerosis   • Stroke Mother    • Other Father         Family hx of Coronary Arteriosclerosis   • Heart Attack Father 66       Social History     Socioeconomic History   • Marital status:      Spouse name: Not on file   • Number of children: Not on file   • Years of education: Not on file   • Highest education level: Not on file   Occupational History   • Not on file   Social Needs   • Financial resource strain: Not on file   • Food insecurity:     Worry: Not on file     Inability: Not on file   • Transportation needs:     Medical: Not on file     Non-medical: Not on file   Tobacco Use   • Smoking status: Never Smoker   • Smokeless tobacco: Never Used   Substance and Sexual Activity   • Alcohol use: Yes     Alcohol/week: 0.6 oz     Types: 1 Glasses of wine per week     Frequency: Monthly or less     Comment: rarely    • Drug use: No   • Sexual activity: Not Currently   Lifestyle   • Physical activity:     Days per week: Not on file     Minutes per session: Not on file   • Stress: Not on file   Relationships   • Social connections:     Talks on phone: Not on file     Gets together: Not on file     Attends Restoration service: Not on file     Active member of club or organization: Not on file     Attends meetings of clubs or organizations: Not on file     Relationship status: Not on file   • Intimate partner violence:     Fear of current or ex partner: Not on file     Emotionally abused: Not on file     Physically abused: Not on file     Forced sexual activity: Not on file   Other Topics Concern   • Not on file   Social History Narrative   • Not on file       Allergies as of 01/09/2020 - Reviewed 01/09/2020   Allergen Reaction Noted   • Ultram [tramadol hcl]  02/09/2011        Vitals:  @Vital signs for  this encounter:    Current medications as of today   Current Outpatient Medications   Medication Sig Dispense Refill   • budesonide-formoterol (SYMBICORT) 160-4.5 MCG/ACT Aerosol USE 2 INHALATIONS ORALLY   TWICE DAILY WITH SPACER,   RINSE MOUTH AFTER EACH USE. 3 Inhaler 3   • atorvastatin (LIPITOR) 20 MG Tab TAKE 1 TABLET AT BEDTIME 90 Tab 3   • raNITidine (ZANTAC) 150 MG Tab TAKE ONE TABLET BY MOUTH DAILY 90 Tab 2   • metFORMIN ER (GLUCOPHAGE XR) 500 MG TABLET SR 24 HR TAKE ONE TABLET BY MOUTH EVERY NIGHT AT BEDTIME 90 Tab 2   • lisinopril-hydrochlorothiazide (PRINZIDE, ZESTORETIC) 20-12.5 MG per tablet TAKE ONE TABLET BY MOUTH EVERY MORNING 90 Tab 2   • albuterol (VENTOLIN HFA) 108 (90 Base) MCG/ACT Aero Soln inhalation aerosol Inhale 2 Puffs by mouth every four hours as needed for Shortness of Breath. 1 Inhaler 11   • meloxicam (MOBIC) 15 MG tablet      • montelukast (SINGULAIR) 10 MG Tab Take 1 Tab by mouth every day. 90 Tab 3   • RESTASIS 0.05 % ophthalmic emulsion Place 1 Drop in both eyes 2 times a day.     • doxepin (SINEQUAN) 25 MG Cap Take 25 mg by mouth every day.     • diltiazem (CARDIZEM) 60 MG Tab TAKE ONE TABLET BY MOUTH EVERY 12 HOURS 180 Tab 3   • Cetirizine HCl (ZYRTEC PO) Take 1 Each by mouth every day.     • pantoprazole (PROTONIX) 40 MG TBEC Take 40 mg by mouth 2 times a day.       • Acetaminophen (TYLENOL PO) Take  by mouth. EXTRA STRENGH 2 TABS PRN      • fluticasone (FLONASE) 50 MCG/ACT nasal spray Spray 1 Spray in nose 2 times a day. Each Nostril        No current facility-administered medications for this visit.          Physical Exam:   Gen:           Alert and oriented, No apparent distress. Mood and affect appropriate, normal interaction with examiner.  Eyes:          PERRL, EOM intact, sclere white, conjunctive moist.  Ears:          Not examined.   Hearing:     Grossly intact.  Nose:          Normal, no lesions or deformities.  Dentition:    Good dentition.  Oropharynx:   Tongue  normal, posterior pharynx without erythema or exudate.  Mallampati Classification: 3  Neck:        Supple, trachea midline, no masses.  Respiratory Effort: No intercostal retractions or use of accessory muscles.   Lung Auscultation:      Clear to auscultation bilaterally; no rales, rhonchi or wheezing.  CV:            Regular rate and rhythm. No murmurs, rubs or gallops. One PVC noted.  Abd:           Not examined.  Lymphadenopathy: Not examined.  Gait and Station: Normal.  Digits and Nails: No clubbing, cyanosis, petechiae, or nodes.   Cranial Nerves: II-XII grossly intact.  Skin:        No rashes, lesions or ulcers noted.               Ext:           No cyanosis or edema.      Assessment:  1. Mild persistent asthma without complication     2. BACILIO (obstructive sleep apnea)     3. Benign essential hypertension     4. Tachycardia     5. Nonsmoker     6. BMI 38.0-38.9,adult  Height And Weight       Immunizations:    Flu:11/2019  Pneumovax 23:1/2019  Prevnar 13:1/2017    Plan:  1.  Patient's asthma is clinically stable.  She will continue her current bronchodilators.  PFT next office visit.  2.  Continue CPAP nightly.  She did not bring her card for review today.  She denies any issues.  DME mask supplies.  Reviewed overnight oximetry from last year and encouraging titration study versus bleeding O2.  She declines at this time.  We discussed the pathophysiology of untreated hypoxia and the significance.  She understands the risk of continuing to go on with therapy as is.  3.  Discussed sleep and respiratory hygiene.  4.  Encourage weight loss.  5.  Follow-up in 6 months with PFT to check symptoms with compliance card, sooner if needed.    Please note that this dictation was created using voice recognition software. I have made every reasonable attempt to correct obvious errors, but it is possible there are errors of grammar and possibly content that I did not discover before finalizing the note.

## 2020-02-17 DIAGNOSIS — R06.02 SHORTNESS OF BREATH: ICD-10-CM

## 2020-02-18 RX ORDER — MONTELUKAST SODIUM 10 MG/1
TABLET ORAL
Qty: 90 TAB | Refills: 3 | Status: SHIPPED | OUTPATIENT
Start: 2020-02-18 | End: 2021-02-19

## 2020-02-18 NOTE — TELEPHONE ENCOUNTER
Have we ever prescribed this med? Yes.  If yes, what date? 1/23/2019    Last OV: 1/9/2020 KELI GAGE     Next OV: 7/9/2020 KELI GAGE     DX: Shortness of breath (R06.02)    Medications: montelukast (SINGULAIR) 10 MG Tab

## 2020-03-16 DIAGNOSIS — I10 ESSENTIAL HYPERTENSION: ICD-10-CM

## 2020-03-16 RX ORDER — ATORVASTATIN CALCIUM 20 MG/1
TABLET, FILM COATED ORAL
Qty: 90 TAB | Refills: 3 | Status: SHIPPED | OUTPATIENT
Start: 2020-03-16 | End: 2021-05-25

## 2020-03-18 RX ORDER — DILTIAZEM HYDROCHLORIDE 60 MG/1
60 TABLET, FILM COATED ORAL EVERY 12 HOURS
Qty: 180 TAB | Refills: 2 | Status: SHIPPED | OUTPATIENT
Start: 2020-03-18 | End: 2020-11-09

## 2020-05-14 ENCOUNTER — HOSPITAL ENCOUNTER (OUTPATIENT)
Dept: LAB | Facility: MEDICAL CENTER | Age: 75
End: 2020-05-14
Attending: NURSE PRACTITIONER
Payer: MEDICARE

## 2020-05-14 DIAGNOSIS — E78.5 DYSLIPIDEMIA: ICD-10-CM

## 2020-05-14 DIAGNOSIS — I10 BENIGN ESSENTIAL HYPERTENSION: ICD-10-CM

## 2020-05-14 DIAGNOSIS — Z11.59 ENCOUNTER FOR HEPATITIS C SCREENING TEST FOR LOW RISK PATIENT: ICD-10-CM

## 2020-05-14 DIAGNOSIS — E11.9 TYPE 2 DIABETES MELLITUS WITHOUT COMPLICATION, WITHOUT LONG-TERM CURRENT USE OF INSULIN (HCC): ICD-10-CM

## 2020-05-14 LAB
ALBUMIN SERPL BCP-MCNC: 4.4 G/DL (ref 3.2–4.9)
ALBUMIN/GLOB SERPL: 1.8 G/DL
ALP SERPL-CCNC: 93 U/L (ref 30–99)
ALT SERPL-CCNC: 59 U/L (ref 2–50)
ANION GAP SERPL CALC-SCNC: 13 MMOL/L (ref 7–16)
AST SERPL-CCNC: 52 U/L (ref 12–45)
BILIRUB SERPL-MCNC: 0.6 MG/DL (ref 0.1–1.5)
BUN SERPL-MCNC: 18 MG/DL (ref 8–22)
CALCIUM SERPL-MCNC: 9.8 MG/DL (ref 8.5–10.5)
CHLORIDE SERPL-SCNC: 100 MMOL/L (ref 96–112)
CHOLEST SERPL-MCNC: 154 MG/DL (ref 100–199)
CO2 SERPL-SCNC: 23 MMOL/L (ref 20–33)
CREAT SERPL-MCNC: 0.76 MG/DL (ref 0.5–1.4)
CREAT UR-MCNC: 42.43 MG/DL
ERYTHROCYTE [DISTWIDTH] IN BLOOD BY AUTOMATED COUNT: 51.7 FL (ref 35.9–50)
FASTING STATUS PATIENT QL REPORTED: NORMAL
GLOBULIN SER CALC-MCNC: 2.5 G/DL (ref 1.9–3.5)
GLUCOSE SERPL-MCNC: 122 MG/DL (ref 65–99)
HCT VFR BLD AUTO: 49.8 % (ref 37–47)
HCV AB SER QL: NORMAL
HDLC SERPL-MCNC: 55 MG/DL
HGB BLD-MCNC: 15.9 G/DL (ref 12–16)
LDLC SERPL CALC-MCNC: 76 MG/DL
MCH RBC QN AUTO: 31.2 PG (ref 27–33)
MCHC RBC AUTO-ENTMCNC: 31.9 G/DL (ref 33.6–35)
MCV RBC AUTO: 97.8 FL (ref 81.4–97.8)
MICROALBUMIN UR-MCNC: <1.2 MG/DL
MICROALBUMIN/CREAT UR: NORMAL MG/G (ref 0–30)
PLATELET # BLD AUTO: 219 K/UL (ref 164–446)
PMV BLD AUTO: 12 FL (ref 9–12.9)
POTASSIUM SERPL-SCNC: 3.9 MMOL/L (ref 3.6–5.5)
PROT SERPL-MCNC: 6.9 G/DL (ref 6–8.2)
RBC # BLD AUTO: 5.09 M/UL (ref 4.2–5.4)
SODIUM SERPL-SCNC: 136 MMOL/L (ref 135–145)
TRIGL SERPL-MCNC: 116 MG/DL (ref 0–149)
TSH SERPL DL<=0.005 MIU/L-ACNC: 1.35 UIU/ML (ref 0.38–5.33)
WBC # BLD AUTO: 7.7 K/UL (ref 4.8–10.8)

## 2020-05-14 PROCEDURE — 83036 HEMOGLOBIN GLYCOSYLATED A1C: CPT | Mod: GA

## 2020-05-14 PROCEDURE — 80053 COMPREHEN METABOLIC PANEL: CPT

## 2020-05-14 PROCEDURE — 80061 LIPID PANEL: CPT

## 2020-05-14 PROCEDURE — 84443 ASSAY THYROID STIM HORMONE: CPT

## 2020-05-14 PROCEDURE — 82570 ASSAY OF URINE CREATININE: CPT

## 2020-05-14 PROCEDURE — 36415 COLL VENOUS BLD VENIPUNCTURE: CPT

## 2020-05-14 PROCEDURE — 86803 HEPATITIS C AB TEST: CPT

## 2020-05-14 PROCEDURE — 85027 COMPLETE CBC AUTOMATED: CPT

## 2020-05-14 PROCEDURE — 82043 UR ALBUMIN QUANTITATIVE: CPT

## 2020-05-15 LAB
EST. AVERAGE GLUCOSE BLD GHB EST-MCNC: 143 MG/DL
HBA1C MFR BLD: 6.6 % (ref 0–5.6)

## 2020-05-21 ENCOUNTER — OFFICE VISIT (OUTPATIENT)
Dept: MEDICAL GROUP | Facility: MEDICAL CENTER | Age: 75
End: 2020-05-21
Payer: MEDICARE

## 2020-05-21 VITALS
SYSTOLIC BLOOD PRESSURE: 110 MMHG | DIASTOLIC BLOOD PRESSURE: 70 MMHG | TEMPERATURE: 98.1 F | HEART RATE: 120 BPM | RESPIRATION RATE: 16 BRPM | WEIGHT: 213 LBS | HEIGHT: 63 IN | BODY MASS INDEX: 37.74 KG/M2 | OXYGEN SATURATION: 94 %

## 2020-05-21 DIAGNOSIS — Z78.0 POSTMENOPAUSAL: ICD-10-CM

## 2020-05-21 DIAGNOSIS — R00.0 TACHYCARDIA: ICD-10-CM

## 2020-05-21 DIAGNOSIS — E78.5 DYSLIPIDEMIA: ICD-10-CM

## 2020-05-21 DIAGNOSIS — R74.01 TRANSAMINITIS: ICD-10-CM

## 2020-05-21 DIAGNOSIS — F41.9 ANXIETY: ICD-10-CM

## 2020-05-21 DIAGNOSIS — I10 BENIGN ESSENTIAL HYPERTENSION: ICD-10-CM

## 2020-05-21 PROCEDURE — 99214 OFFICE O/P EST MOD 30 MIN: CPT | Performed by: NURSE PRACTITIONER

## 2020-05-21 RX ORDER — ALPRAZOLAM 0.5 MG/1
0.5 TABLET ORAL
Qty: 30 TAB | Refills: 5 | Status: SHIPPED | OUTPATIENT
Start: 2020-05-21 | End: 2020-06-20

## 2020-05-21 RX ORDER — PROPRANOLOL HYDROCHLORIDE 20 MG/1
20 TABLET ORAL
Qty: 90 TAB | Refills: 1 | Status: SHIPPED | OUTPATIENT
Start: 2020-05-21 | End: 2020-11-09

## 2020-05-21 ASSESSMENT — FIBROSIS 4 INDEX: FIB4 SCORE: 2.29

## 2020-05-21 ASSESSMENT — PATIENT HEALTH QUESTIONNAIRE - PHQ9: CLINICAL INTERPRETATION OF PHQ2 SCORE: 0

## 2020-05-21 NOTE — PROGRESS NOTES
cc:  6 month follow up      Subjective:     HPI:     Judy Bland is a 74 y.o. female here to discuss the evaluation and management of:    Anxiety  Patient is here today as she needs refill on her anxiety medication.  Patient states she uses this sparingly.  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  She feels like his symptoms are worsening.  Has been getting more confused.  Last fill April 30, 2020.     Diabetes  Has been taking her metformin for this.  Denies any diarrhea. Due for eye exam in July.  Most recent A1c 6.6%.    Blood pressure  Has been taking lisinopril/HCTZ, diltiazem for this.  Denies any shortness of breath, dizziness or leg swelling.     Cholesterol  Taking atorvastatin for this.  Denies myalgias.  Last lipid panel May 2020, total cholesterol 154, triglycerides 116, HDL 55 and LDL 76.    Elevated heart rate  Patient with elevated heart rate 20.  Has persistent tachycardia.  Patient reports that her cardiologist is aware at this time.     Back pain  Recently had injections/nerve ablation in her back about 2 months ago.  Reports this was not helpful.  Has chronic pain.  She reports that surgery is not an option unless she starts to have symptoms on her legs.  At this time she does not.             ROS:  Denies any Headache, Blurred Vision, Confusion, Chest pain,  Shortness of breath,  Abdominal pain, Changes of bowel or bladder, Lower ext edema, Fevers, Nights sweats, Weight Changes, Focal weakness or numbness.  And all other systems reviewed and are all negative.  Back pain and anxiety        Current Outpatient Medications:   •  ALPRAZolam (XANAX) 0.5 MG Tab, Take 1 Tab by mouth 1 time daily as needed for Anxiety for up to 30 days., Disp: 30 Tab, Rfl: 5  •  propranolol (INDERAL) 20 MG Tab, Take 1 Tab by mouth every bedtime., Disp: 90 Tab, Rfl: 1  •  DILTIAZem (CARDIZEM) 60 MG Tab, Take 1 Tab by  mouth every 12 hours., Disp: 180 Tab, Rfl: 2  •  atorvastatin (LIPITOR) 20 MG Tab, TAKE 1 TABLET AT BEDTIME, Disp: 90 Tab, Rfl: 3  •  lisinopril-hydrochlorothiazide (PRINZIDE) 20-12.5 MG per tablet, TAKE ONE TABLET BY MOUTH EVERY MORNING, Disp: 90 Tab, Rfl: 3  •  metFORMIN ER (GLUCOPHAGE XR) 500 MG TABLET SR 24 HR, TAKE ONE TABLET BY MOUTH EVERY NIGHT AT BEDTIME, Disp: 90 Tab, Rfl: 3  •  montelukast (SINGULAIR) 10 MG Tab, TAKE 1 TABLET DAILY, Disp: 90 Tab, Rfl: 3  •  budesonide-formoterol (SYMBICORT) 160-4.5 MCG/ACT Aerosol, USE 2 INHALATIONS ORALLY   TWICE DAILY WITH SPACER,   RINSE MOUTH AFTER EACH USE., Disp: 3 Inhaler, Rfl: 3  •  raNITidine (ZANTAC) 150 MG Tab, TAKE ONE TABLET BY MOUTH DAILY, Disp: 90 Tab, Rfl: 2  •  albuterol (VENTOLIN HFA) 108 (90 Base) MCG/ACT Aero Soln inhalation aerosol, Inhale 2 Puffs by mouth every four hours as needed for Shortness of Breath., Disp: 1 Inhaler, Rfl: 11  •  meloxicam (MOBIC) 15 MG tablet, , Disp: , Rfl:   •  RESTASIS 0.05 % ophthalmic emulsion, Place 1 Drop in both eyes 2 times a day., Disp: , Rfl:   •  doxepin (SINEQUAN) 25 MG Cap, Take 25 mg by mouth every day., Disp: , Rfl:   •  Cetirizine HCl (ZYRTEC PO), Take 1 Each by mouth every day., Disp: , Rfl:   •  pantoprazole (PROTONIX) 40 MG TBEC, Take 40 mg by mouth 2 times a day.  , Disp: , Rfl:   •  Acetaminophen (TYLENOL PO), Take  by mouth. EXTRA STRENGH 2 TABS PRN , Disp: , Rfl:   •  fluticasone (FLONASE) 50 MCG/ACT nasal spray, Spray 1 Spray in nose 2 times a day. Each Nostril , Disp: , Rfl:     Allergies   Allergen Reactions   • Ultram [Tramadol Hcl]      ELEVATED LIVER ENZYMES       Past Medical History:   Diagnosis Date   • Acquired polycythemia 2/15/2012   • Anesthesia     PONV   • Arthritis    • Arthropathy 2/15/2012   • Benign essential hypertension 2/15/2012   • Chronic hepatitis B (HCC) 2/15/2012   • Cough 2/15/2012   • Difficulty breathing 2/15/2012   • Esophageal reflux 2/15/2012   • Heart burn    • Hepatitis  B    • Hypercholesterolemia 2/15/2012   • Hyperlipidemia 2/15/2012   • Hypertension    • Indigestion    • Jaundice    • Murmur 2/15/2012   • Obesity 2/15/2012   • BACILIO (obstructive sleep apnea) 2/15/2012   • Sinus tachycardia 2/15/2012     Past Surgical History:   Procedure Laterality Date   • SEPTOPLASTY  3/12/2012    Performed by MEHDI SWAIN at SURGERY SAME DAY [facility] ORS   • TURBINOPLASTY  3/12/2012    Performed by MEHDI SWAIN at SURGERY SAME DAY [facility] ORS   • SINUSOTOMIES  3/12/2012    Performed by MEHDI SWAIN at SURGERY SAME DAY [facility] ORS   • TRIGGER FINGER RELEASE  11/23/2009    Performed by MEHDI ONEIL at SURGERY [facility] ORS   • CARPAL TUNNEL RELEASE  2009    b/L   • KNEE ARTHROPLASTY TOTAL  4/8/08    Performed by ROHAN BLACKWELL at SURGERY [facility] ORS   • KNEE ARTHROPLASTY TOTAL  4/8/08    Performed by ROHAN BLACKWELL at SURGERY [facility] ORS   • CATARACT EXTRACTION WITH IOL  2008    OU   • ROTATOR CUFF REPAIR  2005    left   • TRIGGER FINGER RELEASE  2005    left   • CERVICAL CONIZATION      Loop Electrode Excision   • HAND SURGERY     • PRIMARY C SECTION       Family History   Problem Relation Age of Onset   • Other Mother         Family hx of Coronary Arteriosclerosis   • Stroke Mother    • Other Father         Family hx of Coronary Arteriosclerosis   • Heart Attack Father 66     Social History     Socioeconomic History   • Marital status:      Spouse name: Not on file   • Number of children: Not on file   • Years of education: Not on file   • Highest education level: Not on file   Occupational History   • Not on file   Social Needs   • Financial resource strain: Not on file   • Food insecurity     Worry: Not on file     Inability: Not on file   • Transportation needs     Medical: Not on file     Non-medical: Not on file   Tobacco Use   • Smoking status: Never Smoker   • Smokeless tobacco: Never Used   Substance and Sexual Activity   • Alcohol use: Yes      "Alcohol/week: 0.6 oz     Types: 1 Glasses of wine per week     Frequency: Monthly or less     Comment: rarely    • Drug use: No   • Sexual activity: Not Currently   Lifestyle   • Physical activity     Days per week: Not on file     Minutes per session: Not on file   • Stress: Not on file   Relationships   • Social connections     Talks on phone: Not on file     Gets together: Not on file     Attends Congregation service: Not on file     Active member of club or organization: Not on file     Attends meetings of clubs or organizations: Not on file     Relationship status: Not on file   • Intimate partner violence     Fear of current or ex partner: Not on file     Emotionally abused: Not on file     Physically abused: Not on file     Forced sexual activity: Not on file   Other Topics Concern   • Not on file   Social History Narrative   • Not on file       Objective:     Vitals: /70   Pulse (!) 120   Temp 36.7 °C (98.1 °F)   Resp 16   Ht 1.6 m (5' 3\")   Wt 96.6 kg (213 lb)   SpO2 94%   BMI 37.73 kg/m²    General: Alert, pleasant, NAD  HEENT: Normocephalic.  Neck supple.  No thyromegaly or masses palpated. No cervical or supraclavicular lymphadenopathy.  Heart: slightly tachycardic Regular rate and rhythm.  S1 and S2 normal.  No murmurs appreciated.  Respiratory: Normal respiratory effort.  Clear to auscultation bilaterally. No wheezing  Skin: Warm, dry, no rashes.  Extremities: No leg edema. No discoloration  Neurological: No tremors  Psych:  Affect/mood is normal, judgement is good, memory is intact, grooming is appropriate.    Assessment/Plan:     Diagnoses and all orders for this visit:    Anxiety  Chronic. Uses sparingly. Cared for  with Alzheimer's. Narx check completed.   -     ALPRAZolam (XANAX) 0.5 MG Tab; Take 1 Tab by mouth 1 time daily as needed for Anxiety for up to 30 days.    Transaminitis  Chronic. Slightly increased compared to previous labs. Repeat labs. Avoid alcohol one week prior to " labs.  -     Comp Metabolic Panel; Future    Benign essential hypertension  Chronic.  Tolerating current regimen.  Most recent blood pressure 110/70.  Last CMP  2020. Continue current regimen.    Postmenopausal  -     DS-BONE DENSITY STUDY (DEXA); Future    Dyslipidemia  Controlled on atorvastatin. Continue. No myalgias.Goal LDL below 70    Tachycardia  Chronic. No pain, no palpitations. Trial of propranolol. Cautioned regarding side effects.    -     propranolol (INDERAL) 20 MG Tab; Take 1 Tab by mouth every bedtime.        Return in about 6 months (around 11/21/2020).          Jessi BURROWS

## 2020-05-28 ENCOUNTER — HOSPITAL ENCOUNTER (OUTPATIENT)
Dept: RADIOLOGY | Facility: MEDICAL CENTER | Age: 75
End: 2020-05-28
Attending: NURSE PRACTITIONER
Payer: MEDICARE

## 2020-05-28 DIAGNOSIS — Z12.31 ENCOUNTER FOR MAMMOGRAM TO ESTABLISH BASELINE MAMMOGRAM: ICD-10-CM

## 2020-05-28 PROCEDURE — 77067 SCR MAMMO BI INCL CAD: CPT

## 2020-06-10 ENCOUNTER — APPOINTMENT (RX ONLY)
Dept: URBAN - METROPOLITAN AREA CLINIC 22 | Facility: CLINIC | Age: 75
Setting detail: DERMATOLOGY
End: 2020-06-10

## 2020-06-10 DIAGNOSIS — L30.4 ERYTHEMA INTERTRIGO: ICD-10-CM

## 2020-06-10 DIAGNOSIS — Z71.89 OTHER SPECIFIED COUNSELING: ICD-10-CM

## 2020-06-10 DIAGNOSIS — L81.4 OTHER MELANIN HYPERPIGMENTATION: ICD-10-CM

## 2020-06-10 DIAGNOSIS — D18.0 HEMANGIOMA: ICD-10-CM

## 2020-06-10 DIAGNOSIS — L82.1 OTHER SEBORRHEIC KERATOSIS: ICD-10-CM

## 2020-06-10 DIAGNOSIS — D22 MELANOCYTIC NEVI: ICD-10-CM

## 2020-06-10 PROBLEM — D48.5 NEOPLASM OF UNCERTAIN BEHAVIOR OF SKIN: Status: ACTIVE | Noted: 2020-06-10

## 2020-06-10 PROBLEM — D22.5 MELANOCYTIC NEVI OF TRUNK: Status: ACTIVE | Noted: 2020-06-10

## 2020-06-10 PROBLEM — D18.01 HEMANGIOMA OF SKIN AND SUBCUTANEOUS TISSUE: Status: ACTIVE | Noted: 2020-06-10

## 2020-06-10 PROCEDURE — ? PRESCRIPTION

## 2020-06-10 PROCEDURE — 11102 TANGNTL BX SKIN SINGLE LES: CPT

## 2020-06-10 PROCEDURE — 99214 OFFICE O/P EST MOD 30 MIN: CPT | Mod: 25

## 2020-06-10 PROCEDURE — ? BIOPSY BY SHAVE METHOD

## 2020-06-10 PROCEDURE — ? COUNSELING

## 2020-06-10 RX ORDER — NYSTATIN 100000 [USP'U]/G
1 POWDER TOPICAL TID
Qty: 1 | Refills: 3 | Status: ERX | COMMUNITY
Start: 2020-06-10

## 2020-06-10 RX ORDER — NYSTATIN 100000 [USP'U]/G
1 CREAM TOPICAL TID
Qty: 1 | Refills: 3 | Status: ERX | COMMUNITY
Start: 2020-06-10

## 2020-06-10 RX ADMIN — NYSTATIN 1: 100000 CREAM TOPICAL at 00:00

## 2020-06-10 RX ADMIN — NYSTATIN 1: 100000 POWDER TOPICAL at 00:00

## 2020-06-10 ASSESSMENT — LOCATION DETAILED DESCRIPTION DERM
LOCATION DETAILED: LEFT INFRAMAMMARY CREASE (INNER QUADRANT)
LOCATION DETAILED: LEFT SUPERIOR MEDIAL UPPER BACK
LOCATION DETAILED: RIGHT SUPERIOR MEDIAL MIDBACK
LOCATION DETAILED: EPIGASTRIC SKIN
LOCATION DETAILED: RIGHT INFRAMAMMARY CREASE (INNER QUADRANT)
LOCATION DETAILED: LEFT SUPRAPUBIC SKIN

## 2020-06-10 ASSESSMENT — LOCATION SIMPLE DESCRIPTION DERM
LOCATION SIMPLE: LEFT BREAST
LOCATION SIMPLE: LEFT UPPER BACK
LOCATION SIMPLE: GROIN
LOCATION SIMPLE: RIGHT BREAST
LOCATION SIMPLE: RIGHT LOWER BACK
LOCATION SIMPLE: ABDOMEN

## 2020-06-10 ASSESSMENT — LOCATION ZONE DERM: LOCATION ZONE: TRUNK

## 2020-07-09 ENCOUNTER — OFFICE VISIT (OUTPATIENT)
Dept: PULMONOLOGY | Facility: HOSPICE | Age: 75
End: 2020-07-09
Payer: MEDICARE

## 2020-07-09 ENCOUNTER — NON-PROVIDER VISIT (OUTPATIENT)
Dept: PULMONOLOGY | Facility: HOSPICE | Age: 75
End: 2020-07-09
Attending: NURSE PRACTITIONER
Payer: MEDICARE

## 2020-07-09 VITALS
HEART RATE: 80 BPM | WEIGHT: 212 LBS | OXYGEN SATURATION: 90 % | RESPIRATION RATE: 16 BRPM | SYSTOLIC BLOOD PRESSURE: 132 MMHG | BODY MASS INDEX: 34.07 KG/M2 | DIASTOLIC BLOOD PRESSURE: 66 MMHG | HEIGHT: 66 IN

## 2020-07-09 VITALS — BODY MASS INDEX: 37.55 KG/M2 | WEIGHT: 212 LBS

## 2020-07-09 DIAGNOSIS — Z78.9 NONSMOKER: ICD-10-CM

## 2020-07-09 DIAGNOSIS — G47.33 OSA (OBSTRUCTIVE SLEEP APNEA): Chronic | ICD-10-CM

## 2020-07-09 DIAGNOSIS — J45.30 MILD PERSISTENT ASTHMA WITHOUT COMPLICATION: Chronic | ICD-10-CM

## 2020-07-09 DIAGNOSIS — I10 BENIGN ESSENTIAL HYPERTENSION: Chronic | ICD-10-CM

## 2020-07-09 PROCEDURE — 94729 DIFFUSING CAPACITY: CPT | Performed by: INTERNAL MEDICINE

## 2020-07-09 PROCEDURE — 94726 PLETHYSMOGRAPHY LUNG VOLUMES: CPT | Performed by: INTERNAL MEDICINE

## 2020-07-09 PROCEDURE — 94060 EVALUATION OF WHEEZING: CPT | Performed by: INTERNAL MEDICINE

## 2020-07-09 PROCEDURE — 99214 OFFICE O/P EST MOD 30 MIN: CPT | Mod: 25 | Performed by: NURSE PRACTITIONER

## 2020-07-09 ASSESSMENT — FIBROSIS 4 INDEX
FIB4 SCORE: 2.29
FIB4 SCORE: 2.29

## 2020-07-09 ASSESSMENT — PULMONARY FUNCTION TESTS
FEV1/FVC_PERCENT_CHANGE: 100
FEV1_PERCENT_CHANGE: -9
FVC_PREDICTED: 2.92
FEV1_PERCENT_PREDICTED: 83
FEV1: 1.87
FVC: 2.18
FVC_PERCENT_PREDICTED: 67
FVC_LLN: 2.44
FEV1/FVC: 86
FEV1/FVC: 86
FEV1/FVC_PERCENT_PREDICTED: 110
FEV1: 1.7
FEV1_LLN: 1.87
FVC_PERCENT_PREDICTED: 74
FEV1/FVC: 86
FEV1/FVC_PREDICTED: 77
FEV1_PERCENT_PREDICTED: 75
FEV1_PREDICTED: 2.24
FEV1/FVC_PERCENT_PREDICTED: 112
FEV1/FVC_PERCENT_PREDICTED: 77
FEV1/FVC_PERCENT_CHANGE: 0
FEV1/FVC_PERCENT_PREDICTED: 11
FEV1/FVC: 86.29
FVC: 1.97
FEV1/FVC_PERCENT_PREDICTED: 112
FEV1/FVC_PERCENT_LLN: 65
FEV1_PERCENT_CHANGE: -9

## 2020-07-09 NOTE — PROCEDURES
Technician: JEFF Call    Technician Comment:  Good patient effort & cooperation.  The results of this test meet the ATS/ERS standards for acceptability & reproducibility.  Test was performed on the [employer] Body Plethysmograph-Elite DX system.  Predicted values were GLI-2012 for spirometry, GLI-2017 for DLCO, ITS for Lung Volumes.  The DLCO was uncorrected for Hgb.  A bronchodilator of Ventolin HFA -2puffs via spacer administered.  DLCO performed during dilation period.    The FVC is 2.18 L or 74%, FEV1 is 1.87 L or 83%, FEV1/FVC: 86%.  Total lung capacity: 78%.  DLCO: 91%.  No significant bronchodilator response.    Interpretation:  Mild restrictive ventilatory defect with total lung capacity: 78%.  This may be secondary to BMI: 34.  Clinical correlation required.  Normal gas transfer.

## 2020-07-09 NOTE — PROGRESS NOTES
Chief Complaint   Patient presents with   • Follow-Up     Last Seen 1/9/2020   • Results     PFT        HPI:  Judy Bland is a 74 y.o. year old female here today for follow-up on asthma andOSA.  She is accompanied by her .  LAST OV 1/9/2020     PATIENT ALSO SLEEP PATIENT; SEE DICTATION 1/23/19 FOR HISTORY.     She is followed by Dr. Quiroga for allergies, Cardio Dr. Rasheed for HTN, tachycardia, murmur and dyslipidemia. Hx of Hep B from needle stick.  Retired RN. Chronic tachycardia followed by Cardio, pending appt. Prior treadmill stress test negative for ischemia but deconditioning noted and hypertensive rxn to exercise.     Pulmonary hx:  Nonsmoker.  PFT 12/18/14 indicated FVC 2.05L or 62%, FEV1 1.71L or 68%, FEV1/FVC ratio 84 and DLCO 120% predicted.    PFT 3/15/2019 indicates normal spirometry with FEV1 2.22 L or 94%, FEV1/FVC ratio 83%, TLC 84% and a DLCO 110% predicted.  PFTs 7/9/2020 indicates FVC 2.18 L or 74%, FEV1 1.87 L or 83%, FEV1/FVC ratio 86%, TLC 78% with a DLCO of 91% predicted.  No significant response.  I reviewed finds with patient.  She did try Breo 1 puff daily but not feel it is any more beneficial than Symbicort.   She is compliant with Symbicort 160/4.5mcg 2 puffs BID, Singulair qhs, zyrtec QD, Flonase prn and Ventolin HFA inhaler prn which she rarely uses.    She continues her allergy shot injections.    GERD is controlled on medication.    Last echocardiogram was in 2015 indicating EF 65-70%, RVSP 24-29 mmHg.    She has not required JANETT but not finding herself to get out of breath more quickly with exertion. She denies chest tightness/wheezing. She notes dry cough in AM. She feels she is more sedentary since virus started; also from heat and back pain. She denies allergy issues. GERD controlled with PPI.     Sleep hx:  Prior PSG at Dr. Andrade's office 2011 indicated severe sleep apnea with an overall AHI of 38 and a minimum saturation of 67%.   CNOX on Pap 4/19/2019  indicated less than 80% for 593.7 minutes of the night.  Essentially the whole night.  Titration study was recommended but she has continued to decline. She understands risk of nocturnal hypoxia.  She is currently using APAP 9-15cm  Compliance report 6/9-7/8/2020 compliance, average nightly use 8 hours 20 minutes, mean pressure 10 cm, normal mask leak with reduced AHI of 4.1/h.  I reviewed with patient.  She feels her sleep is poor with difficulty initiating and maintaining it. She wakes 1-2x's per night and urinates. Her  has alzheimers who she cares for causing stress. The current state of affairs is also causing stress. She tolerates mask and pressure well. She denies regular AM headaches. She is amenable to pressure adjustment with repeat CNOX in hopes of improving her sleep/hypoxia.        ROS: As per HPI and otherwise negative if not stated.    Past Medical History:   Diagnosis Date   • Acquired polycythemia 2/15/2012   • Anesthesia     PONV   • Arthritis    • Arthropathy 2/15/2012   • Benign essential hypertension 2/15/2012   • Chronic hepatitis B (HCC) 2/15/2012   • Cough 2/15/2012   • Difficulty breathing 2/15/2012   • Esophageal reflux 2/15/2012   • Heart burn    • Hepatitis B    • Hypercholesterolemia 2/15/2012   • Hyperlipidemia 2/15/2012   • Hypertension    • Indigestion    • Jaundice    • Murmur 2/15/2012   • Obesity 2/15/2012   • BACILIO (obstructive sleep apnea) 2/15/2012   • Sinus tachycardia 2/15/2012       Past Surgical History:   Procedure Laterality Date   • SEPTOPLASTY  3/12/2012    Performed by MEHDI SWAIN at SURGERY SAME DAY [facility] ORS   • TURBINOPLASTY  3/12/2012    Performed by MEHDI SWAIN at SURGERY SAME DAY [facility] ORS   • SINUSOTOMIES  3/12/2012    Performed by MEHDI SWAIN at SURGERY SAME DAY [facility] ORS   • TRIGGER FINGER RELEASE  11/23/2009    Performed by MEHDI ONEIL at SURGERY [facility] ORS   • CARPAL TUNNEL RELEASE  2009 b/L   • KNEE ARTHROPLASTY TOTAL   4/8/08    Performed by ROHAN BLACKWELL at SURGERY [facility] ORS   • KNEE ARTHROPLASTY TOTAL  4/8/08    Performed by ROHAN BLACKWELL at SURGERY [facility] ORS   • CATARACT EXTRACTION WITH IOL  2008    OU   • ROTATOR CUFF REPAIR  2005    left   • TRIGGER FINGER RELEASE  2005    left   • CERVICAL CONIZATION      Loop Electrode Excision   • HAND SURGERY     • PRIMARY C SECTION         Family History   Problem Relation Age of Onset   • Other Mother         Family hx of Coronary Arteriosclerosis   • Stroke Mother    • Other Father         Family hx of Coronary Arteriosclerosis   • Heart Attack Father 66       Social History     Socioeconomic History   • Marital status:      Spouse name: Not on file   • Number of children: Not on file   • Years of education: Not on file   • Highest education level: Not on file   Occupational History   • Not on file   Social Needs   • Financial resource strain: Not on file   • Food insecurity     Worry: Not on file     Inability: Not on file   • Transportation needs     Medical: Not on file     Non-medical: Not on file   Tobacco Use   • Smoking status: Never Smoker   • Smokeless tobacco: Never Used   Substance and Sexual Activity   • Alcohol use: Yes     Alcohol/week: 0.6 oz     Types: 1 Glasses of wine per week     Frequency: Monthly or less     Comment: rarely    • Drug use: No   • Sexual activity: Not Currently   Lifestyle   • Physical activity     Days per week: Not on file     Minutes per session: Not on file   • Stress: Not on file   Relationships   • Social connections     Talks on phone: Not on file     Gets together: Not on file     Attends [employer] service: Not on file     Active member of club or organization: Not on file     Attends meetings of clubs or organizations: Not on file     Relationship status: Not on file   • Intimate partner violence     Fear of current or ex partner: Not on file     Emotionally abused: Not on file     Physically abused: Not on file      Forced sexual activity: Not on file   Other Topics Concern   • Not on file   Social History Narrative   • Not on file       Allergies as of 07/09/2020 - Reviewed 07/09/2020   Allergen Reaction Noted   • Ultram [tramadol hcl]  02/09/2011        Vitals:  @Vital signs for this encounter:    Current medications as of today   Current Outpatient Medications   Medication Sig Dispense Refill   • propranolol (INDERAL) 20 MG Tab Take 1 Tab by mouth every bedtime. 90 Tab 1   • DILTIAZem (CARDIZEM) 60 MG Tab Take 1 Tab by mouth every 12 hours. 180 Tab 2   • atorvastatin (LIPITOR) 20 MG Tab TAKE 1 TABLET AT BEDTIME 90 Tab 3   • lisinopril-hydrochlorothiazide (PRINZIDE) 20-12.5 MG per tablet TAKE ONE TABLET BY MOUTH EVERY MORNING 90 Tab 3   • metFORMIN ER (GLUCOPHAGE XR) 500 MG TABLET SR 24 HR TAKE ONE TABLET BY MOUTH EVERY NIGHT AT BEDTIME 90 Tab 3   • montelukast (SINGULAIR) 10 MG Tab TAKE 1 TABLET DAILY 90 Tab 3   • budesonide-formoterol (SYMBICORT) 160-4.5 MCG/ACT Aerosol USE 2 INHALATIONS ORALLY   TWICE DAILY WITH SPACER,   RINSE MOUTH AFTER EACH USE. 3 Inhaler 3   • albuterol (VENTOLIN HFA) 108 (90 Base) MCG/ACT Aero Soln inhalation aerosol Inhale 2 Puffs by mouth every four hours as needed for Shortness of Breath. 1 Inhaler 11   • meloxicam (MOBIC) 15 MG tablet      • RESTASIS 0.05 % ophthalmic emulsion Place 1 Drop in both eyes 2 times a day.     • doxepin (SINEQUAN) 25 MG Cap Take 25 mg by mouth every day.     • Cetirizine HCl (ZYRTEC PO) Take 1 Each by mouth every day.     • pantoprazole (PROTONIX) 40 MG TBEC Take 40 mg by mouth 2 times a day.       • Acetaminophen (TYLENOL PO) Take  by mouth. EXTRA STRENGH 2 TABS PRN      • fluticasone (FLONASE) 50 MCG/ACT nasal spray Spray 1 Spray in nose 2 times a day. Each Nostril      • raNITidine (ZANTAC) 150 MG Tab TAKE ONE TABLET BY MOUTH DAILY (Patient not taking: Reported on 7/9/2020) 90 Tab 2     No current facility-administered medications for this visit.          Physical  Exam:   Gen:           Alert and oriented, No apparent distress. Mood and affect appropriate, normal interaction with examiner.  Eyes:          PERRL, EOM intact, sclere white, conjunctive moist.  Ears:          Not examined.   Hearing:     Grossly intact.  Nose:          Normal, no lesions or deformities.  Dentition:    Good dentition.  Oropharynx:   mask  Mallampati Classification: mask  Neck:        Supple, trachea midline, no masses.  Respiratory Effort: No intercostal retractions or use of accessory muscles.   Lung Auscultation:      Clear to auscultation bilaterally; no rales, rhonchi or wheezing.  CV:            Regular rate and rhythm. No murmurs, rubs or gallops.  Abd:           Not examined.   Lymphadenopathy: Not examined.  Gait and Station: Normal.  Digits and Nails: No clubbing, cyanosis, petechiae, or nodes.   Cranial Nerves: II-XII grossly intact.  Skin:        No rashes, lesions or ulcers noted.               Ext:           No cyanosis or edema.      Assessment:  1. Mild persistent asthma without complication     2. BACILIO (obstructive sleep apnea)     3. BMI 34.0-34.9,adult  Height And Weight   4. Benign essential hypertension     5. Nonsmoker         Immunizations:    Flu:11/2019  Pneumovax 23:1/2019  Prevnar 13:1/2017    Plan:  1.  Patient's asthma is clinically stable but she reports increased dyspnea.  We reviewed appropriate use of albuterol HFA inhaler.  She will continue with her current bronchodilators.  2.  Encouraged increased regular exercise with walking and dietary changes for weight loss.  3.  Discussed sleep and respiratory hygiene.  4.  Continue CPAP nightly; adjusted pressures in office.  DME other; adjusted to APAP 10.5 cm to 15 cm.  CNOX on Pap in 1 month; may call results to patient.  Patient is unable to do titration study and lab currently due to her  having Alzheimer's and unable to leave him alone at night.  5.  Follow-up in 6 months to check symptoms with compliance  report, sooner if needed.    Please note that this dictation was created using voice recognition software. I have made every reasonable attempt to correct obvious errors, but it is possible there are errors of grammar and possibly content that I did not discover before finalizing the note.

## 2020-07-09 NOTE — PATIENT INSTRUCTIONS
Complete overnight oxygen test using CPAP  Start using albuterol rescue inhaler in AM or during the day as needed for shortness of breath  Focus on diet and weight loss

## 2020-08-10 ENCOUNTER — HOME STUDY (OUTPATIENT)
Dept: PULMONOLOGY | Facility: HOSPICE | Age: 75
End: 2020-08-10
Attending: NURSE PRACTITIONER
Payer: MEDICARE

## 2020-08-10 DIAGNOSIS — G47.33 OSA (OBSTRUCTIVE SLEEP APNEA): Chronic | ICD-10-CM

## 2020-08-10 PROCEDURE — 94762 N-INVAS EAR/PLS OXIMTRY CONT: CPT | Performed by: INTERNAL MEDICINE

## 2020-08-16 DIAGNOSIS — J45.909 UNCOMPLICATED ASTHMA, UNSPECIFIED ASTHMA SEVERITY, UNSPECIFIED WHETHER PERSISTENT: ICD-10-CM

## 2020-08-17 RX ORDER — BUDESONIDE AND FORMOTEROL FUMARATE DIHYDRATE 160; 4.5 UG/1; UG/1
AEROSOL RESPIRATORY (INHALATION)
Qty: 30.6 G | Refills: 4 | Status: SHIPPED | OUTPATIENT
Start: 2020-08-17 | End: 2021-06-25

## 2020-08-17 NOTE — TELEPHONE ENCOUNTER
Have we ever prescribed this med? Yes.  If yes, what date? 10/09/2019    Last OV: 07/09/2020 with Deidra GAGE    Next OV: no Pending appt.     DX: Uncomplicated asthma, unspecified asthma severity, unspecified whether persistent (J45.909)    Medications:   Requested Prescriptions     Pending Prescriptions Disp Refills   • budesonide-formoterol (SYMBICORT) 160-4.5 MCG/ACT Aerosol [Pharmacy Med Name: BUDES/FORMOT -4.5] 30.6 g 3     Sig: USE 2 INHALATIONS ORALLY   TWICE DAILY WITH SPACER,   RINSE MOUTH AFTER EACH USE

## 2020-08-19 DIAGNOSIS — G47.33 OSA (OBSTRUCTIVE SLEEP APNEA): ICD-10-CM

## 2020-08-19 DIAGNOSIS — G47.34 NOCTURNAL HYPOXIA: ICD-10-CM

## 2020-08-19 NOTE — PROCEDURES
The patient was studied with continuous pulse oximetry overnight while using auto CPAP in the range of 10 to 15 cm of water pressure.  Basal oxygen saturation was 87%.  The patient spent 239 minutes with saturations less than 88%.  The minimum saturation was 66%.  The average low saturation was 85%.    This study demonstrates the presence of persisting hypoxemia despite the use of CPAP.  The patient may require increased pressures, repeat titration, or supplemental oxygen.

## 2020-08-21 ENCOUNTER — TELEPHONE (OUTPATIENT)
Dept: PULMONOLOGY | Facility: HOSPICE | Age: 75
End: 2020-08-21

## 2020-08-21 NOTE — TELEPHONE ENCOUNTER
----- Message from MARKOS Kaplan sent at 8/19/2020  8:49 AM PDT -----  Patient is having persistent low oxygen levels on CPAP therapy. Previous OV noted she cannot leave home due to caring for her  with Alzheimers to complete titration study. She would need sleep study to qualify for O2 at night.  Recommend either attempting to complete titration study versus increasing pressure significantly; she could also obtain an O2 concentrator out of pocket and we can order connector to be used with her CPAP at night. Please let me know.  Order signed for titration study if she chooses to complete.

## 2020-08-21 NOTE — TELEPHONE ENCOUNTER
Pt stated that her cat is dying and she doesn't want to deal with this right now so she will back when she is ready.

## 2020-09-10 NOTE — TELEPHONE ENCOUNTER
Caller: Judy    Phone Number:[phone] (home)     Message: Pt called and l/m. Wanting a refill on Nasal spray and to schedule her Sleep study.      Pended rx: Flonase      Called and spoke with pt. Notified pt I will route her message regarding her Refill.  Pt understood.  Scheduled pt SST for 10/08/2020. Mailed instructions to pt.

## 2020-09-14 RX ORDER — FLUTICASONE PROPIONATE 50 MCG
1 SPRAY, SUSPENSION (ML) NASAL 2 TIMES DAILY
Qty: 16 G | Refills: 3 | Status: SHIPPED | OUTPATIENT
Start: 2020-09-14 | End: 2020-12-28 | Stop reason: SDUPTHER

## 2020-09-14 NOTE — TELEPHONE ENCOUNTER
Called pt and l/m. Notifying pt the medication she requested was approved and sent to her pharmacy.

## 2020-10-08 ENCOUNTER — TELEPHONE (OUTPATIENT)
Dept: SLEEP MEDICINE | Facility: MEDICAL CENTER | Age: 75
End: 2020-10-08

## 2020-10-08 ENCOUNTER — SLEEP STUDY (OUTPATIENT)
Dept: SLEEP MEDICINE | Facility: MEDICAL CENTER | Age: 75
End: 2020-10-08
Attending: NURSE PRACTITIONER
Payer: MEDICARE

## 2020-10-08 DIAGNOSIS — G47.34 NOCTURNAL HYPOXIA: ICD-10-CM

## 2020-10-08 DIAGNOSIS — G47.33 OSA (OBSTRUCTIVE SLEEP APNEA): ICD-10-CM

## 2020-10-08 NOTE — TELEPHONE ENCOUNTER
FYI    Pt is having PSG tonight due to low o2.    Last OV 07/2020, should the patient need a bleed-in she will need a OV within 30 days on 10/8/2020.

## 2020-10-09 PROCEDURE — 95811 POLYSOM 6/>YRS CPAP 4/> PARM: CPT | Performed by: FAMILY MEDICINE

## 2020-10-09 NOTE — PROCEDURES
Technical summary: The patient underwent a CPAP titration.  This was a 16 channel montage study to include a 6 channel EEG, a 2 channel EOG, and chin EMG, left and right leg EMG, a snore channel, and a CFLOW pressure transducer.   Respiratory effort was assessed with the use of a thoracic and abdominal monitor and overnight oximetry was obtained. Audio and video recordings were reviewed. This was a fully attended study and sleep stage scoring was performed. The test was technically adequate.    Interpretation:  Study start time was 10:01:53 PM. Diagnostic recording time was 8h 5.5m with a total sleep time of 6h 57.5m resulting in a sleep efficiency of 85.99%%.   Sleep latency from the start of the study was 42 minutes and the latency from sleep to REM was 83 minutes.  In total,17 arousals were scored for an arousal index of 2.4.    Respiratory:  There were a total of 0 apneas consisting of 0 obstructive apneas, 0 mixed apneas, and 0 central apneas. A total of 10 hypopneas were scored.  The apnea index was 0.00 per hour and the hypopnea index was 1.44 per hour resulting in an overall AHI of 1.44.AHI during rem was 2.0 and AHI while supine was 9.23.    Oximetry:  There was a mean oxygen saturation of 89.0% with a minimum oxygen saturation of 80.0%. Time spent with oxygen saturations below 89% was 243.1 minutes.    Cardiac:  The highest heart rate seen while awake was 91 BPM while the highest heart rate during sleep was 92 BPM with an average sleeping heart rate of 75 BPM.    Limb Movements:  There were a total of 313 PLMs during sleep, of which 5 were PLMS arousals. This resulted in a PLMS index of 45.0 and a PLMS arousal index of 0.7.    CPAP was tried from 9 cm H2O to 14 cm H2O. BPAP was tried from 15/11 cm H2O to 17/13 cm H2O.    CPAP Titration:  The PAP titration was initiated with CPAP 9 cm of water and the pressure which was slowly titrated up in an attempt to eliminate sleep disordered breathing and snoring.  CPAP was increased to 14  Cm before switching to BiPAP. The BiPAP was titrated between 15/11 cm to 17/13 cm. Supplemenatl O2 bleed in was added at 2L/min due to the persistent sleep related hypoxia. She did well on CPAP as well.On CPAP 14 cm the apnea hypopnea index improved to 2.3 per hour and O2 sara 85%. The average O2 stauration was 89%. She spent 232 min of sleep time below 89% O2 saturation. Snoring was resolved.  The patient utilized small whisp mask with heated humidification. The CPAP was well-tolerated and there were minimal air leaks.   Impression:  1.  Obstructive sleep apnea   2.  Sleep related hypoxia      Recommendations:  I recommend CPAP 14 cm with O2 bleed in and whisp mask. Consider supplemental O2 bleed in and f/u with OPO on the recommended pressure due to residual sleep hypoxia.Recommended 30 day compliance download to assess the efficacy of the recommended pressure and compliance for further outpatient monitoring and management of CPAP therapy. In some cases alternative treatment options may prove effective in resolving sleep apnea and these options include upper airway surgery, the use of a dental orthotic or weight loss and positional therapy. Clinical correlation is required. In general patients with sleep apnea are advised to avoid alcohol and sedatives and to not operate a motor vehicle while drowsy and are at a greater risk for cardiovascular disease.

## 2020-10-12 NOTE — TELEPHONE ENCOUNTER
O2 levels improved on BIPAP but she does still meet criteria for bleed in o2.  Trina, can you let patient know that I would like to order her a BIPAP and could benefit from bleed in o2.  Does she want to use o2? If not, I will placed orders for BIPAP only and she will need 3mos f/u for compliance check then.

## 2020-10-15 NOTE — TELEPHONE ENCOUNTER
Upon review of this chart it would really be in the best interest of the pt to have her come in for a follow up and order thd o2 since she clearly qualified <<< this is from an insurance standpoint.  If we opt not to order and pt is struggling in the future - pt will need to repeat the titration study to qualify (since she is MDCR) and we run the risk of not getting paid for the repeat study since she qualified during this study.

## 2020-10-29 ENCOUNTER — OFFICE VISIT (OUTPATIENT)
Dept: SLEEP MEDICINE | Facility: MEDICAL CENTER | Age: 75
End: 2020-10-29
Payer: MEDICARE

## 2020-10-29 VITALS
HEIGHT: 66 IN | HEART RATE: 99 BPM | OXYGEN SATURATION: 90 % | WEIGHT: 214 LBS | DIASTOLIC BLOOD PRESSURE: 68 MMHG | BODY MASS INDEX: 34.39 KG/M2 | RESPIRATION RATE: 16 BRPM | SYSTOLIC BLOOD PRESSURE: 118 MMHG

## 2020-10-29 DIAGNOSIS — E66.9 OBESITY (BMI 30-39.9): ICD-10-CM

## 2020-10-29 DIAGNOSIS — I10 BENIGN ESSENTIAL HYPERTENSION: Chronic | ICD-10-CM

## 2020-10-29 DIAGNOSIS — G47.34 NOCTURNAL HYPOXIA: ICD-10-CM

## 2020-10-29 DIAGNOSIS — J45.30 MILD PERSISTENT ASTHMA WITHOUT COMPLICATION: Chronic | ICD-10-CM

## 2020-10-29 DIAGNOSIS — G47.33 OSA (OBSTRUCTIVE SLEEP APNEA): Chronic | ICD-10-CM

## 2020-10-29 DIAGNOSIS — Z78.9 NONSMOKER: ICD-10-CM

## 2020-10-29 PROCEDURE — 99214 OFFICE O/P EST MOD 30 MIN: CPT | Performed by: NURSE PRACTITIONER

## 2020-10-29 ASSESSMENT — FIBROSIS 4 INDEX: FIB4 SCORE: 2.32

## 2020-10-29 NOTE — PROGRESS NOTES
Chief Complaint   Patient presents with   • Follow-Up     Mild persistent asthma without complication // Last Seen 7/9/2020       HPI:  Judy Bland is a 75 y.o. year old female here today for follow-up on BACILIO; sleep study results. Hx of asthma.  Last OV 7/9/20.  She is accompanied by her .    Past medical history of hypertension, tachycardia, murmur and dyslipidemia.  History of hepatitis B from [facility].  She is a retired RN.  Recent treadmill test 2019 was negative for ischemia but deconditioning noted and hypertensive reaction exercise.    CNOX indicated basal SPO2 of 87% patient spent 239 minutes less than 88% ox saturation using auto CPAP 10 to 15 cm.  O2 sara of 85%.  Recommendation was titration study.  Titration study 10/8/2020 indicates 85% sleep efficiency, with improved response on BiPAP 15/11 cm with reduced AHI of 0/h and O2 sara of 82%.  Mean SPO2 85%.  Patient did qualify for bleeding O2.  I reviewed finds with patient.  She is amenable to adjusting therapy.    Today she notes ongoing allergies and remains on flonase and zyrtec every day. GERD controlled with PPI. She notes dyspnea with exertion and going upstairs in home. She notes being more sedentary and not walking regularly. She notes occasional cough/phlegm and sometimes wheezing.     Sleep hx:  Prior PSG at Dr. Andrade's office 2011 indicated severe sleep apnea with an overall AHI of 38 and a minimum saturation of 67%.   CNOX on Pap 4/19/2019 indicated less than 80% for 593.7 minutes of the night.  Essentially the whole night.  Titration study was recommended but she has continued to decline. She understands risk of nocturnal hypoxia.  She is currently using APAP 9-15cm  Compliance report 6/9-7/8/2020 compliance, average nightly use 8 hours 20 minutes, mean pressure 10 cm, normal mask leak with reduced AHI of 4.1/h.  I reviewed with patient.  She feels her sleep is poor with difficulty initiating and maintaining it. She  wakes 1-2x's per night and urinates. Her  has alzheimers who she cares for causing stress. The current state of affairs is also causing stress. She tolerates mask and pressure well. She denies regular AM headaches.      Also follow-up for asthma.  Pulmonary hx:  Nonsmoker.  PFT 12/18/14 indicated FVC 2.05L or 62%, FEV1 1.71L or 68%, FEV1/FVC ratio 84 and DLCO 120% predicted.    PFT 3/15/2019 indicates normal spirometry with FEV1 2.22 L or 94%, FEV1/FVC ratio 83%, TLC 84% and a DLCO 110% predicted.  PFTs 7/9/2020 indicates FVC 2.18 L or 74%, FEV1 1.87 L or 83%, FEV1/FVC ratio 86%, TLC 78% with a DLCO of 91% predicted.  No significant response.  I reviewed finds with patient.  She did try Breo 1 puff daily but not feel it is any more beneficial than Symbicort.   She is compliant with Symbicort 160/4.5mcg 2 puffs BID, Singulair qhs, zyrtec QD, Flonase prn and Ventolin HFA inhaler prn which she rarely uses.    She continues her allergy shot injections.    GERD is controlled on medication.    Last echocardiogram was in 2015 indicating EF 65-70%, RVSP 24-29 mmHg.       ROS: As per HPI and otherwise negative if not stated.    Past Medical History:   Diagnosis Date   • Acquired polycythemia 2/15/2012   • Anesthesia     PONV   • Arthritis    • Arthropathy 2/15/2012   • Benign essential hypertension 2/15/2012   • Chronic hepatitis B (HCC) 2/15/2012   • Cough 2/15/2012   • Difficulty breathing 2/15/2012   • Esophageal reflux 2/15/2012   • Heart burn    • Hepatitis B    • Hypercholesterolemia 2/15/2012   • Hyperlipidemia 2/15/2012   • Hypertension    • Indigestion    • Jaundice    • Murmur 2/15/2012   • Obesity 2/15/2012   • BACILIO (obstructive sleep apnea) 2/15/2012   • Sinus tachycardia 2/15/2012       Past Surgical History:   Procedure Laterality Date   • SEPTOPLASTY  3/12/2012    Performed by MEHDI SWAIN at SURGERY SAME DAY [facility] ORS   • TURBINOPLASTY  3/12/2012    Performed by MEHDI SWAIN at SURGERY SAME DAY  [facility]   • SINUSOTOMIES  3/12/2012    Performed by MEHDI SWAIN at SURGERY SAME DAY [facility]   • TRIGGER FINGER RELEASE  11/23/2009    Performed by MEHDI ONEIL at SURGERY [facility] ORS   • CARPAL TUNNEL RELEASE  2009    b/L   • KNEE ARTHROPLASTY TOTAL  4/8/08    Performed by ROHAN BLACKWELL at SURGERY [facility] ORS   • KNEE ARTHROPLASTY TOTAL  4/8/08    Performed by ROHAN BLACKWELL at SURGERY [facility] ORS   • CATARACT EXTRACTION WITH IOL  2008    OU   • ROTATOR CUFF REPAIR  2005    left   • TRIGGER FINGER RELEASE  2005    left   • CERVICAL CONIZATION      Loop Electrode Excision   • HAND SURGERY     • PRIMARY C SECTION         Family History   Problem Relation Age of Onset   • Other Mother         Family hx of Coronary Arteriosclerosis   • Stroke Mother    • Other Father         Family hx of Coronary Arteriosclerosis   • Heart Attack Father 66       Social History     Socioeconomic History   • Marital status:      Spouse name: Not on file   • Number of children: Not on file   • Years of education: Not on file   • Highest education level: Not on file   Occupational History   • Not on file   Social Needs   • Financial resource strain: Not on file   • Food insecurity     Worry: Not on file     Inability: Not on file   • Transportation needs     Medical: Not on file     Non-medical: Not on file   Tobacco Use   • Smoking status: Never Smoker   • Smokeless tobacco: Never Used   Substance and Sexual Activity   • Alcohol use: Yes     Alcohol/week: 0.6 oz     Types: 1 Glasses of wine per week     Frequency: Monthly or less     Comment: rarely    • Drug use: No   • Sexual activity: Not Currently   Lifestyle   • Physical activity     Days per week: Not on file     Minutes per session: Not on file   • Stress: Not on file   Relationships   • Social connections     Talks on phone: Not on file     Gets together: Not on file     Attends [employer] service: Not on file     Active member of club or  "organization: Not on file     Attends meetings of clubs or organizations: Not on file     Relationship status: Not on file   • Intimate partner violence     Fear of current or ex partner: Not on file     Emotionally abused: Not on file     Physically abused: Not on file     Forced sexual activity: Not on file   Other Topics Concern   • Not on file   Social History Narrative   • Not on file       Allergies as of 10/29/2020 - Reviewed 10/29/2020   Allergen Reaction Noted   • Ultram [tramadol hcl]  02/09/2011        Vitals:  /68 (BP Location: Left arm, Patient Position: Sitting, BP Cuff Size: Adult)   Pulse 99   Resp 16   Ht 1.676 m (5' 6\")   Wt 97.1 kg (214 lb)   SpO2 90%     Current medications as of today   Current Outpatient Medications   Medication Sig Dispense Refill   • fluticasone (FLONASE) 50 MCG/ACT nasal spray Spray 1 Spray in nose 2 times a day. Each Nostril 16 g 3   • budesonide-formoterol (SYMBICORT) 160-4.5 MCG/ACT Aerosol USE 2 INHALATIONS ORALLY   TWICE DAILY WITH SPACER,   RINSE MOUTH AFTER EACH USE 30.6 g 4   • propranolol (INDERAL) 20 MG Tab Take 1 Tab by mouth every bedtime. 90 Tab 1   • DILTIAZem (CARDIZEM) 60 MG Tab Take 1 Tab by mouth every 12 hours. 180 Tab 2   • atorvastatin (LIPITOR) 20 MG Tab TAKE 1 TABLET AT BEDTIME 90 Tab 3   • lisinopril-hydrochlorothiazide (PRINZIDE) 20-12.5 MG per tablet TAKE ONE TABLET BY MOUTH EVERY MORNING 90 Tab 3   • metFORMIN ER (GLUCOPHAGE XR) 500 MG TABLET SR 24 HR TAKE ONE TABLET BY MOUTH EVERY NIGHT AT BEDTIME 90 Tab 3   • montelukast (SINGULAIR) 10 MG Tab TAKE 1 TABLET DAILY 90 Tab 3   • albuterol (VENTOLIN HFA) 108 (90 Base) MCG/ACT Aero Soln inhalation aerosol Inhale 2 Puffs by mouth every four hours as needed for Shortness of Breath. 1 Inhaler 11   • meloxicam (MOBIC) 15 MG tablet      • RESTASIS 0.05 % ophthalmic emulsion Place 1 Drop in both eyes 2 times a day.     • doxepin (SINEQUAN) 25 MG Cap Take 25 mg by mouth every day.     • Cetirizine " HCl (ZYRTEC PO) Take 1 Each by mouth every day.     • pantoprazole (PROTONIX) 40 MG TBEC Take 40 mg by mouth 2 times a day.       • Acetaminophen (TYLENOL PO) Take  by mouth. EXTRA STRENGH 2 TABS PRN        No current facility-administered medications for this visit.          Physical Exam:   Gen:           Alert and oriented, No apparent distress. Mood and affect appropriate, normal interaction with examiner.  Eyes:          PERRL, EOM intact, sclere white, conjunctive moist.  Ears:          Not examined.   Hearing:     Grossly intact.  Nose:          Normal, no lesions or deformities.  Dentition:    Good dentition.  Oropharynx:   mask  Mallampati Classification: mask  Neck:        Supple, trachea midline, no masses.  Respiratory Effort: No intercostal retractions or use of accessory muscles.   Lung Auscultation:      Clear to auscultation bilaterally; no rales, rhonchi or wheezing.  CV:            Regular rate and rhythm. No murmurs, rubs or gallops.  Abd:           Not examined.   Lymphadenopathy: Not examined.  Gait and Station: Normal.  Digits and Nails: No clubbing, cyanosis, petechiae, or nodes.   Cranial Nerves: II-XII grossly intact.  Skin:        No rashes, lesions or ulcers noted.               Ext:           No cyanosis or edema.      Assessment:  1. BACILIO (obstructive sleep apnea)     2. Mild persistent asthma without complication     3. Benign essential hypertension     4. Obesity (BMI 30-39.9)     5. Nonsmoker         Immunizations:    Flu:10/2020  Pneumovax 23:2019  Prevnar 13:2017    Plan:  1. DME BIPAP 15/11cm with 2LPM bleed in O2.  Patient understands they may have mask fits within first 30 days of therapy covered by insurance to obtain best fit.  Patient understands the need to use device every night for >4hrs to meet compliance standards for insurance purposes.  2. Discussed sleep and respiratory hygiene.  3. Encouraged weight loss through diet/exercise; encouraged routine walking  4. Continue  current inhaler therapy; use JANETT prior to using stairs in home; 1-2 times during day  5. Follow up in 3 mos with compliance report/check symptoms, sooner if needed.    Please note that this dictation was created using voice recognition software. I have made every reasonable attempt to correct obvious errors, but it is possible there are errors of grammar and possibly content that I did not discover before finalizing the note.

## 2020-11-12 ENCOUNTER — OFFICE VISIT (OUTPATIENT)
Dept: CARDIOLOGY | Facility: MEDICAL CENTER | Age: 75
End: 2020-11-12
Payer: MEDICARE

## 2020-11-12 VITALS
SYSTOLIC BLOOD PRESSURE: 138 MMHG | DIASTOLIC BLOOD PRESSURE: 80 MMHG | OXYGEN SATURATION: 88 % | HEIGHT: 66 IN | HEART RATE: 72 BPM | BODY MASS INDEX: 34.91 KG/M2 | WEIGHT: 217.2 LBS

## 2020-11-12 DIAGNOSIS — I10 BENIGN ESSENTIAL HYPERTENSION: Chronic | ICD-10-CM

## 2020-11-12 DIAGNOSIS — I25.10 CORONARY ARTERY DISEASE DUE TO CALCIFIED CORONARY LESION: ICD-10-CM

## 2020-11-12 DIAGNOSIS — E78.5 DYSLIPIDEMIA: Chronic | ICD-10-CM

## 2020-11-12 DIAGNOSIS — I25.10 CORONARY ARTERY CALCIFICATION SEEN ON CAT SCAN: ICD-10-CM

## 2020-11-12 DIAGNOSIS — I25.84 CORONARY ARTERY DISEASE DUE TO CALCIFIED CORONARY LESION: ICD-10-CM

## 2020-11-12 PROCEDURE — 99214 OFFICE O/P EST MOD 30 MIN: CPT | Performed by: INTERNAL MEDICINE

## 2020-11-12 RX ORDER — NYSTATIN 100000 [USP'U]/G
POWDER TOPICAL
COMMUNITY
Start: 2020-10-15 | End: 2022-10-11

## 2020-11-12 RX ORDER — NYSTATIN 100000 U/G
1 CREAM TOPICAL PRN
COMMUNITY
Start: 2020-10-15

## 2020-11-12 RX ORDER — ALPRAZOLAM 0.5 MG/1
TABLET ORAL
COMMUNITY
Start: 2020-10-28 | End: 2020-11-20 | Stop reason: SDUPTHER

## 2020-11-12 ASSESSMENT — ENCOUNTER SYMPTOMS
PND: 0
MYALGIAS: 0
DIZZINESS: 0
SHORTNESS OF BREATH: 1
ORTHOPNEA: 0
LOSS OF CONSCIOUSNESS: 0
PALPITATIONS: 0

## 2020-11-12 ASSESSMENT — FIBROSIS 4 INDEX: FIB4 SCORE: 2.32

## 2020-11-12 NOTE — PROGRESS NOTES
"Chief Complaint   Patient presents with   • Coronary Artery Disease     f/v Dx:disease due to calcified coronary lesion       Subjective:   Judy Bland is a 75 y.o. female who presents today hypertension, tachycardia with a history of asthma, obstructive sleep apnea and exertional shortness of breath, hyperlipidemia.    Last seen on 11/11/2019.    Since 11/11/2019 the patient has had no cardiac symptoms.  Last month had 2 weeks of right sciatica which she had never had before resolved with NSAIDs and topical cream.  Has a known history of cervical and lumbar disc disease with prior \"injections\" followed by [facility] neurosurgery.  Still has exertional shortness of breath on CPAP for 9 years.  Had recent updated sleep study and switch to BiPAP.  Admits to gaining weight, activity limited due to spinal disease and enjoys eating \"good\" food at her age.    Since 6/6/2019 appointment the patient has had no cardiac problems or symptoms. 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 Her  age 73 has Alzheimer's and was a retired nurse anesthetist from the [employer].    Since 3/23/2018 appointment the patient continues to have significant exertional shortness of breath without anginal symptoms.  Walking up an incline a short distance or up stairs at home results in significant dyspnea relieved with rest.  Never smoked cigarettes.  PFTs generally unremarkable but continues with a diagnosis of asthma and bronchodilator therapy.  Previous standard exercise treadmill stress test was unremarkable except hypertensive response exercise.    Since 3/16/2017 appointment the patient has had no new cardiac symptoms.  Continues to have exertional shortness of breath.  Her  has developed Alzheimer's disease and his short-term memory is worse.  Walks at the [employer] which is a 2 mile course now having to stop 3 times.  Previous normal pulmonary function test.    Past medical history  No " specific cardiac symptoms though does have some exertional shortness of breath which generally has been chronic.  Has asthma uses daily inhalers.  No specific angina.  Tries to walk some every day  No PND orthopnea or lower extremity edema.  Compliant with her medications.    In December, 2014, saw her pulmonologist.  PFTs showed fracture of being overweight but no obstructive disease.  Takes medicines for asthma diagnosed 3 years ago.  A lot of problems with Kenalog causing eczema than had to be on long-term prednisone.    No history of CAD or angina.  Previous echocardiograms had shown left ventricular hypertrophy but no valve disease. Right heart pressures could not be evaluated.  Had a remote stress echocardiogram that was apparently unremarkable.    Social history  Originally from [place] and subsequent [facility]    Past Medical History:   Diagnosis Date   • Acquired polycythemia 2/15/2012   • Anesthesia     PONV   • Arthritis    • Arthropathy 2/15/2012   • Benign essential hypertension 2/15/2012   • Chronic hepatitis B (HCC) 2/15/2012   • Cough 2/15/2012   • Difficulty breathing 2/15/2012   • Esophageal reflux 2/15/2012   • Heart burn    • Hepatitis B    • Hypercholesterolemia 2/15/2012   • Hyperlipidemia 2/15/2012   • Hypertension    • Indigestion    • Jaundice    • Murmur 2/15/2012   • Obesity 2/15/2012   • BACILIO (obstructive sleep apnea) 2/15/2012   • Sinus tachycardia 2/15/2012     Past Surgical History:   Procedure Laterality Date   • SEPTOPLASTY  3/12/2012    Performed by MEHDI SWAIN at SURGERY SAME DAY [facility] OR[facility]   • TURBINOPLASTY  3/12/2012    Performed by MEHDI SWAIN at SURGERY SAME DAY [facility]   • SINUSOTOMIES  3/12/2012    Performed by MEHDI SWAIN at SURGERY SAME DAY [facility]   • TRIGGER FINGER RELEASE  11/23/2009    Performed by MEHDI ONEIL at SURGERY [facility]   • CARPAL TUNNEL RELEASE  2009    b/L   • KNEE ARTHROPLASTY TOTAL  4/8/08    Performed by RISHI  ROHAN REYNOSO at SURGERY [facility] ORS   • KNEE ARTHROPLASTY TOTAL  4/8/08    Performed by ROHAN BLACKWELL at SURGERY [facility] ORS   • CATARACT EXTRACTION WITH IOL  2008    OU   • ROTATOR CUFF REPAIR  2005    left   • TRIGGER FINGER RELEASE  2005    left   • CERVICAL CONIZATION      Loop Electrode Excision   • HAND SURGERY     • PRIMARY C SECTION       Family History   Problem Relation Age of Onset   • Other Mother         Family hx of Coronary Arteriosclerosis   • Stroke Mother    • Other Father         Family hx of Coronary Arteriosclerosis   • Heart Attack Father 66     Social History     Socioeconomic History   • Marital status:      Spouse name: Not on file   • Number of children: Not on file   • Years of education: Not on file   • Highest education level: Not on file   Occupational History   • Not on file   Social Needs   • Financial resource strain: Not on file   • Food insecurity     Worry: Not on file     Inability: Not on file   • Transportation needs     Medical: Not on file     Non-medical: Not on file   Tobacco Use   • Smoking status: Never Smoker   • Smokeless tobacco: Never Used   Substance and Sexual Activity   • Alcohol use: Yes     Alcohol/week: 0.6 oz     Types: 1 Glasses of wine per week     Frequency: Monthly or less     Comment: rarely    • Drug use: No   • Sexual activity: Not Currently   Lifestyle   • Physical activity     Days per week: Not on file     Minutes per session: Not on file   • Stress: Not on file   Relationships   • Social connections     Talks on phone: Not on file     Gets together: Not on file     Attends Hoahaoism service: Not on file     Active member of club or organization: Not on file     Attends meetings of clubs or organizations: Not on file     Relationship status: Not on file   • Intimate partner violence     Fear of current or ex partner: Not on file     Emotionally abused: Not on file     Physically abused: Not on file     Forced sexual activity: Not on file    Other Topics Concern   • Not on file   Social History Narrative   • Not on file     Allergies   Allergen Reactions   • Ultram [Tramadol Hcl]      ELEVATED LIVER ENZYMES     Outpatient Encounter Medications as of 11/12/2020   Medication Sig Dispense Refill   • nystatin (MYCOSTATIN) 562463 UNIT/GM Cream topical cream      • NYAMYC powder      • ALPRAZolam (XANAX) 0.5 MG Tab      • propranolol (INDERAL) 20 MG Tab TAKE ONE TABLET BY MOUTH EVERY NIGHT AT BEDTIME 90 Tab 3   • fluticasone (FLONASE) 50 MCG/ACT nasal spray Spray 1 Spray in nose 2 times a day. Each Nostril 16 g 3   • budesonide-formoterol (SYMBICORT) 160-4.5 MCG/ACT Aerosol USE 2 INHALATIONS ORALLY   TWICE DAILY WITH SPACER,   RINSE MOUTH AFTER EACH USE 30.6 g 4   • DILTIAZem (CARDIZEM) 60 MG Tab Take 1 Tab by mouth every 12 hours. 180 Tab 2   • atorvastatin (LIPITOR) 20 MG Tab TAKE 1 TABLET AT BEDTIME 90 Tab 3   • lisinopril-hydrochlorothiazide (PRINZIDE) 20-12.5 MG per tablet TAKE ONE TABLET BY MOUTH EVERY MORNING 90 Tab 3   • metFORMIN ER (GLUCOPHAGE XR) 500 MG TABLET SR 24 HR TAKE ONE TABLET BY MOUTH EVERY NIGHT AT BEDTIME 90 Tab 3   • montelukast (SINGULAIR) 10 MG Tab TAKE 1 TABLET DAILY 90 Tab 3   • albuterol (VENTOLIN HFA) 108 (90 Base) MCG/ACT Aero Soln inhalation aerosol Inhale 2 Puffs by mouth every four hours as needed for Shortness of Breath. 1 Inhaler 11   • RESTASIS 0.05 % ophthalmic emulsion Place 1 Drop in both eyes 2 times a day.     • doxepin (SINEQUAN) 25 MG Cap Take 25 mg by mouth every day.     • Cetirizine HCl (ZYRTEC PO) Take 1 Each by mouth every day.     • pantoprazole (PROTONIX) 40 MG TBEC Take 40 mg by mouth 2 times a day.       • Acetaminophen (TYLENOL PO) Take  by mouth. EXTRA STRENGH 2 TABS PRN      • meloxicam (MOBIC) 15 MG tablet        No facility-administered encounter medications on file as of 11/12/2020.      Review of Systems   Respiratory: Positive for shortness of breath.    Cardiovascular: Negative for chest pain,  "palpitations, orthopnea, leg swelling and PND.   Musculoskeletal: Negative for myalgias.   Neurological: Negative for dizziness and loss of consciousness.        Objective:   /80 (BP Location: Left arm, Patient Position: Sitting, BP Cuff Size: Adult)   Pulse 72   Ht 1.676 m (5' 6\")   Wt 98.5 kg (217 lb 3.2 oz)   SpO2 88%   BMI 35.06 kg/m²     Physical Exam   Constitutional: She is oriented to person, place, and time. She appears well-developed and well-nourished. No distress.   Eyes: Pupils are equal, round, and reactive to light. Conjunctivae are normal.   Neck: Normal range of motion. Neck supple. No JVD present.   Cardiovascular: Normal rate, regular rhythm, normal heart sounds and intact distal pulses.   No murmur heard.  Pulses:       Carotid pulses are 1+ on the right side and 1+ on the left side.       Radial pulses are 1+ on the right side and 1+ on the left side.        Posterior tibial pulses are 1+ on the right side and 1+ on the left side.   Pulmonary/Chest: Effort normal and breath sounds normal. No accessory muscle usage. No respiratory distress. She has no wheezes. She has no rales.   Increased AP diameter   Abdominal:   Protuberant.   Musculoskeletal:         General: No edema.   Neurological: She is alert and oriented to person, place, and time.   Skin: Skin is warm, dry and intact. No rash noted. No cyanosis. Nails show no clubbing.   Psychiatric: She has a normal mood and affect. Her behavior is normal.     PFTs.  INTERPRETATION: Lung function testing completed July 26, 2017 revealed normal spirometry. No change after bronchodilators. Lung volumes do not demonstrate significant restriction or hyperinflation. Low expiratory reserve volume is consistent with the patient's elevated BMI. Oxygen transfer was normal. Good effort noted.    03/4/2011 ECHOCARDIOGRAM  EF greater than 65%. Mild to moderate left hypertrophy. Pulmonary pressures cannot be assessed.    03/06/2015 " ECHOCARDIOGRAM  Normal left ventricular systolic function.  Normal regional wall motion with vigorous global contractility.  Left ventricular ejection fraction is 65% to 70%.  Normal right ventricular systolic function.  Aortic sclerosis without stenosis.  Right ventricular systolic pressure is estimated to be 24-29 mmHg.  Anterior echo free space with some enhanced echogenicity of the   posterior pericardium; uncertain significance.     MPI 06/18/2019  Normal myocardial perfusion with no ischemia.   Normal left ventricular wall motion.  LV ejection fraction = 76%.   TID absent.    TREADMILL 04/02/2018  1. Limited exercise capacity but adequate heart rate response.  2. Negative exercise treadmill stress test for ischemia.  3. No arrhythmias.  4. Hypertensive response to exercise.    ABDOMINAL CT SCAN 2013  Dense diffuse ostial, proximal and mid LAD calcification    03/16/2017 EKG: Normal sinus rhythm, rate 99. Isolated PVC.    Assessment:     No diagnosis found.  Medical Decision Making:  Today's Assessment / Status / Plan:     Assessment  1.  Coronary artery disease as demonstrated by coronary calcification on CT scan.  2.  Hypertension.    3.  Hyperlipidemia. On atorvastatin.  4.  Asthma.  5.  Sleep apnea on CPAP.  6.  Overweight.  7.  Diabetes mellitus.  8.  Cervical/lumbar disease.    Recommendations  1.  The patient is stable from a cardiac standpoint concerning her CAD, hypertension and dyslipidemia.  2.  Reviewed recent laboratory tests, LDL 70s.  3.  Continue current cardiac therapy.  4.  RTC 1 year.

## 2020-11-20 ENCOUNTER — OFFICE VISIT (OUTPATIENT)
Dept: MEDICAL GROUP | Facility: MEDICAL CENTER | Age: 75
End: 2020-11-20
Payer: MEDICARE

## 2020-11-20 VITALS — HEIGHT: 66 IN | BODY MASS INDEX: 34.55 KG/M2 | TEMPERATURE: 97.1 F | WEIGHT: 215 LBS

## 2020-11-20 DIAGNOSIS — G47.33 OSA (OBSTRUCTIVE SLEEP APNEA): Chronic | ICD-10-CM

## 2020-11-20 DIAGNOSIS — I10 BENIGN ESSENTIAL HYPERTENSION: ICD-10-CM

## 2020-11-20 DIAGNOSIS — E11.9 TYPE 2 DIABETES MELLITUS WITHOUT COMPLICATION, WITHOUT LONG-TERM CURRENT USE OF INSULIN (HCC): ICD-10-CM

## 2020-11-20 DIAGNOSIS — F41.9 ANXIETY: ICD-10-CM

## 2020-11-20 PROCEDURE — 99441 PR PHYSICIAN TELEPHONE EVALUATION 5-10 MIN: CPT | Performed by: NURSE PRACTITIONER

## 2020-11-20 RX ORDER — ALPRAZOLAM 0.5 MG/1
0.5 TABLET ORAL
Qty: 30 TAB | Refills: 5 | Status: SHIPPED | OUTPATIENT
Start: 2020-11-29 | End: 2021-06-11

## 2020-11-20 ASSESSMENT — FIBROSIS 4 INDEX: FIB4 SCORE: 2.32

## 2020-11-20 NOTE — PROGRESS NOTES
As a means of avoiding spread of COVID-19, this visit is being conducted by telephone. This telephone visit was initiated by the patient and they verbally consented.    Time at start of call: 11:12    Reason for Call:  6 months follow up    Judy Calvillo Baldo via PHONE VISIT to discuss:  Unable to perform via VIDEO due to not having zoom    Blood pressure  Has been taking lisinopril/HCTZ, diltiazem for this.  Denies any shortness of breath, dizziness or leg swelling.  Following with cardiology.    Diabetes  Last 6.6%.  Taking Metformin for this.  Denies any GI upset.  Up-to-date on eye exam.    Anxiety  Last fill of Xanax per  was 11/1/2020.  Due for refill.  Following up every 6 months for this. Patient states she uses this sparingly.  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  She feels like his symptoms are worsening.  Has been getting more confused.      BACILIO  Went to Pulmonary   Her pulse ox was averaging 89 and did drop to 80.   Waiting on getting the Bipap. Patient will call to follow up on this.     Of Note:  Had a sciatic problem with her right leg.this is new for her on the right leg. Had severe pain for a few weeks. Taking Ibuprofen and elevating her leg/knee. Salonpas was helpful as week. No recall of injury or fall. Went down until the ankle. Resolved at this time.       Vitals:    11/20/20 1102   Temp: 36.2 °C (97.1 °F)       Patient Active Problem List    Diagnosis Date Noted   • Coronary artery calcification seen on CAT scan 06/06/2019   • Coronary artery disease due to calcified coronary lesion 06/06/2019   • Chronic midline low back pain without sciatica 08/14/2018   • Spondylosis of cervical region without myelopathy or radiculopathy 08/14/2018   • Type 2 diabetes mellitus without complication, without long-term current use of insulin (HCC) 05/10/2018   • Elevated LFTs 03/23/2018   • Obesity (BMI  30-39.9) 07/26/2017   • Mild persistent asthma without complication 07/26/2017   • LLOYD (dyspnea on exertion) 03/16/2017   • Allergic rhinitis due to allergen 06/13/2016   • Acquired polycythemia 02/15/2012   • Benign essential hypertension 02/15/2012   • Murmur 02/15/2012   • BACILIO (obstructive sleep apnea) 02/15/2012   • Sinus tachycardia 02/15/2012   • Arthropathy 02/15/2012   • Chronic hepatitis B (HCC) 02/15/2012   • Esophageal reflux 02/15/2012   • Dyslipidemia 02/15/2012       Current Outpatient Medications   Medication Sig Dispense Refill   • [START ON 11/29/2020] ALPRAZolam (XANAX) 0.5 MG Tab Take 1 Tab by mouth 1 time daily as needed for Anxiety for up to 30 days. 30 Tab 5   • DILTIAZem (CARDIZEM) 60 MG Tab Take 1 Tab by mouth 2 Times a Day. 180 Tab 1   • nystatin (MYCOSTATIN) 194198 UNIT/GM Cream topical cream      • NYAMYC powder      • propranolol (INDERAL) 20 MG Tab TAKE ONE TABLET BY MOUTH EVERY NIGHT AT BEDTIME 90 Tab 3   • fluticasone (FLONASE) 50 MCG/ACT nasal spray Spray 1 Spray in nose 2 times a day. Each Nostril 16 g 3   • budesonide-formoterol (SYMBICORT) 160-4.5 MCG/ACT Aerosol USE 2 INHALATIONS ORALLY   TWICE DAILY WITH SPACER,   RINSE MOUTH AFTER EACH USE 30.6 g 4   • atorvastatin (LIPITOR) 20 MG Tab TAKE 1 TABLET AT BEDTIME 90 Tab 3   • lisinopril-hydrochlorothiazide (PRINZIDE) 20-12.5 MG per tablet TAKE ONE TABLET BY MOUTH EVERY MORNING 90 Tab 3   • metFORMIN ER (GLUCOPHAGE XR) 500 MG TABLET SR 24 HR TAKE ONE TABLET BY MOUTH EVERY NIGHT AT BEDTIME 90 Tab 3   • montelukast (SINGULAIR) 10 MG Tab TAKE 1 TABLET DAILY 90 Tab 3   • albuterol (VENTOLIN HFA) 108 (90 Base) MCG/ACT Aero Soln inhalation aerosol Inhale 2 Puffs by mouth every four hours as needed for Shortness of Breath. 1 Inhaler 11   • meloxicam (MOBIC) 15 MG tablet      • RESTASIS 0.05 % ophthalmic emulsion Place 1 Drop in both eyes 2 times a day.     • doxepin (SINEQUAN) 25 MG Cap Take 25 mg by mouth every day.     • Cetirizine HCl  (ZYRTEC PO) Take 1 Each by mouth every day.     • pantoprazole (PROTONIX) 40 MG TBEC Take 40 mg by mouth 2 times a day.       • Acetaminophen (TYLENOL PO) Take  by mouth. EXTRA STRENGH 2 TABS PRN        No current facility-administered medications for this visit.        Assessment and plan:    1. Anxiety  Chronic.  Caring for her  who has Alzheimer's that is advancing.  Refills provided and sent electronically through [employer] provided.   checked.  No inconsistencies.  - ALPRAZolam (XANAX) 0.5 MG Tab; Take 1 Tab by mouth 1 time daily as needed for Anxiety for up to 30 days.  Dispense: 30 Tab; Refill: 5    2. Type 2 diabetes mellitus without complication, without long-term current use of insulin (HCC)  Stable on current regimen.  Continue current regimen.  Due for monofilament at next visit.    3. Benign essential hypertension  Stable.  Tolerating current regimen.  Followed by cardiology.    Follow up every 6 months      Time at end of call: 11:19    Total Time Spent: 5-10 minutes        Jessi GAGE

## 2020-12-28 DIAGNOSIS — J45.30 MILD PERSISTENT ASTHMA WITHOUT COMPLICATION: ICD-10-CM

## 2020-12-28 RX ORDER — FLUTICASONE PROPIONATE 50 MCG
1 SPRAY, SUSPENSION (ML) NASAL 2 TIMES DAILY
Qty: 48 G | Refills: 3 | Status: SHIPPED | OUTPATIENT
Start: 2020-12-28 | End: 2022-12-30

## 2020-12-28 NOTE — TELEPHONE ENCOUNTER
Have we ever prescribed this med? Yes.  If yes, what date? 09/14/2020    Last OV: 10/29/2020 - Deidra Sr    Next OV: 03/15/2021 - Deidra Sr    DX: Asthma    Medications: Flonase

## 2021-01-11 DIAGNOSIS — Z23 NEED FOR VACCINATION: ICD-10-CM

## 2021-02-16 ENCOUNTER — IMMUNIZATION (OUTPATIENT)
Dept: FAMILY PLANNING/WOMEN'S HEALTH CLINIC | Facility: IMMUNIZATION CENTER | Age: 76
End: 2021-02-16
Attending: INTERNAL MEDICINE
Payer: MEDICARE

## 2021-02-16 DIAGNOSIS — Z23 ENCOUNTER FOR VACCINATION: Primary | ICD-10-CM

## 2021-02-16 DIAGNOSIS — Z23 NEED FOR VACCINATION: ICD-10-CM

## 2021-02-16 PROCEDURE — 91300 PFIZER SARS-COV-2 VACCINE: CPT

## 2021-02-16 PROCEDURE — 0001A PFIZER SARS-COV-2 VACCINE: CPT

## 2021-02-19 DIAGNOSIS — R06.02 SHORTNESS OF BREATH: ICD-10-CM

## 2021-02-19 RX ORDER — MONTELUKAST SODIUM 10 MG/1
TABLET ORAL
Qty: 90 TABLET | Refills: 3 | Status: SHIPPED | OUTPATIENT
Start: 2021-02-19 | End: 2022-01-07

## 2021-02-19 NOTE — TELEPHONE ENCOUNTER
Have we ever prescribed this med? Yes.  If yes, what date? 2/18/2020    Last OV: 10/29/2020 KELI GAGE     Next OV: 3/15/21 KELI GAGE     DX: Shortness of breath (R06.02)    Medications: montelukast (SINGULAIR) 10 MG Tab

## 2021-03-01 ENCOUNTER — APPOINTMENT (RX ONLY)
Dept: URBAN - METROPOLITAN AREA CLINIC 22 | Facility: CLINIC | Age: 76
Setting detail: DERMATOLOGY
End: 2021-03-01

## 2021-03-01 DIAGNOSIS — L82.1 OTHER SEBORRHEIC KERATOSIS: ICD-10-CM

## 2021-03-01 DIAGNOSIS — Z71.89 OTHER SPECIFIED COUNSELING: ICD-10-CM

## 2021-03-01 DIAGNOSIS — L81.4 OTHER MELANIN HYPERPIGMENTATION: ICD-10-CM

## 2021-03-01 DIAGNOSIS — D18.0 HEMANGIOMA: ICD-10-CM

## 2021-03-01 DIAGNOSIS — D22 MELANOCYTIC NEVI: ICD-10-CM

## 2021-03-01 DIAGNOSIS — D69.2 OTHER NONTHROMBOCYTOPENIC PURPURA: ICD-10-CM

## 2021-03-01 DIAGNOSIS — I87.2 VENOUS INSUFFICIENCY (CHRONIC) (PERIPHERAL): ICD-10-CM

## 2021-03-01 DIAGNOSIS — L91.8 OTHER HYPERTROPHIC DISORDERS OF THE SKIN: ICD-10-CM

## 2021-03-01 DIAGNOSIS — L57.0 ACTINIC KERATOSIS: ICD-10-CM

## 2021-03-01 DIAGNOSIS — L30.4 ERYTHEMA INTERTRIGO: ICD-10-CM | Status: INADEQUATELY CONTROLLED

## 2021-03-01 PROBLEM — D18.01 HEMANGIOMA OF SKIN AND SUBCUTANEOUS TISSUE: Status: ACTIVE | Noted: 2021-03-01

## 2021-03-01 PROBLEM — D22.5 MELANOCYTIC NEVI OF TRUNK: Status: ACTIVE | Noted: 2021-03-01

## 2021-03-01 PROCEDURE — ? PRESCRIPTION

## 2021-03-01 PROCEDURE — 17003 DESTRUCT PREMALG LES 2-14: CPT

## 2021-03-01 PROCEDURE — ? COUNSELING

## 2021-03-01 PROCEDURE — 17000 DESTRUCT PREMALG LESION: CPT

## 2021-03-01 PROCEDURE — ? SUNSCREEN RECOMMENDATIONS

## 2021-03-01 PROCEDURE — ? LIQUID NITROGEN

## 2021-03-01 PROCEDURE — 99214 OFFICE O/P EST MOD 30 MIN: CPT | Mod: 25

## 2021-03-01 RX ORDER — NYSTATIN 100000 [USP'U]/G
1 CREAM TOPICAL TID
Qty: 1 | Refills: 3 | Status: ERX

## 2021-03-01 RX ORDER — TRIAMCINOLONE ACETONIDE 1 MG/G
1 CREAM TOPICAL BID
Qty: 1 | Refills: 1 | Status: ERX | COMMUNITY
Start: 2021-03-01

## 2021-03-01 RX ORDER — NYSTATIN 100000 [USP'U]/G
1 POWDER TOPICAL TID
Qty: 1 | Refills: 3 | Status: ERX

## 2021-03-01 RX ADMIN — TRIAMCINOLONE ACETONIDE 1: 1 CREAM TOPICAL at 00:00

## 2021-03-01 ASSESSMENT — LOCATION SIMPLE DESCRIPTION DERM
LOCATION SIMPLE: RIGHT LOWER BACK
LOCATION SIMPLE: RIGHT FOREARM
LOCATION SIMPLE: LEFT BREAST
LOCATION SIMPLE: LEFT UPPER BACK
LOCATION SIMPLE: LEFT FOREARM
LOCATION SIMPLE: GROIN
LOCATION SIMPLE: POSTERIOR NECK
LOCATION SIMPLE: RIGHT BREAST
LOCATION SIMPLE: NOSE
LOCATION SIMPLE: ABDOMEN
LOCATION SIMPLE: LEFT TEMPLE

## 2021-03-01 ASSESSMENT — LOCATION DETAILED DESCRIPTION DERM
LOCATION DETAILED: RIGHT MEDIAL TRAPEZIAL NECK
LOCATION DETAILED: RIGHT INFRAMAMMARY CREASE (INNER QUADRANT)
LOCATION DETAILED: LEFT SUPRAPUBIC SKIN
LOCATION DETAILED: RIGHT PROXIMAL DORSAL FOREARM
LOCATION DETAILED: LEFT MID TEMPLE
LOCATION DETAILED: RIGHT SUPERIOR MEDIAL MIDBACK
LOCATION DETAILED: LEFT PROXIMAL DORSAL FOREARM
LOCATION DETAILED: EPIGASTRIC SKIN
LOCATION DETAILED: LEFT INFRAMAMMARY CREASE (INNER QUADRANT)
LOCATION DETAILED: NASAL SUPRATIP
LOCATION DETAILED: LEFT SUPERIOR MEDIAL UPPER BACK

## 2021-03-01 ASSESSMENT — LOCATION ZONE DERM
LOCATION ZONE: FACE
LOCATION ZONE: TRUNK
LOCATION ZONE: NECK
LOCATION ZONE: ARM
LOCATION ZONE: NOSE

## 2021-03-01 NOTE — PROCEDURE: LIQUID NITROGEN
Number Of Freeze-Thaw Cycles: 2 freeze-thaw cycles
Duration Of Freeze Thaw-Cycle (Seconds): 3
Detail Level: Detailed
Post-Care Instructions: I reviewed with the patient in detail post-care instructions. Patient is to wear sunprotection, and avoid picking at any of the treated lesions. Pt may apply Vaseline to crusted or scabbing areas.
Render Note In Bullet Format When Appropriate: No
Consent: The patient's consent was obtained including but not limited to risks of crusting, scabbing, blistering, scarring, darker or lighter pigmentary change, recurrence, incomplete removal and infection.

## 2021-03-02 ENCOUNTER — RX ONLY (OUTPATIENT)
Age: 76
Setting detail: RX ONLY
End: 2021-03-02

## 2021-03-02 RX ORDER — DOXEPIN HYDROCHLORIDE 25 MG/1
1 CAPSULE ORAL QD
Qty: 30 | Refills: 5 | Status: ERX | COMMUNITY
Start: 2021-03-01

## 2021-03-06 ENCOUNTER — IMMUNIZATION (OUTPATIENT)
Dept: FAMILY PLANNING/WOMEN'S HEALTH CLINIC | Facility: IMMUNIZATION CENTER | Age: 76
End: 2021-03-06
Attending: INTERNAL MEDICINE
Payer: MEDICARE

## 2021-03-06 DIAGNOSIS — Z23 ENCOUNTER FOR VACCINATION: Primary | ICD-10-CM

## 2021-03-06 PROCEDURE — 0002A PFIZER SARS-COV-2 VACCINE: CPT | Performed by: INTERNAL MEDICINE

## 2021-03-06 PROCEDURE — 91300 PFIZER SARS-COV-2 VACCINE: CPT | Performed by: INTERNAL MEDICINE

## 2021-03-08 RX ORDER — METFORMIN HYDROCHLORIDE 500 MG/1
TABLET, EXTENDED RELEASE ORAL
Qty: 90 TABLET | Refills: 2 | Status: SHIPPED | OUTPATIENT
Start: 2021-03-08 | End: 2021-08-17 | Stop reason: SDUPTHER

## 2021-03-15 ENCOUNTER — OFFICE VISIT (OUTPATIENT)
Dept: SLEEP MEDICINE | Facility: MEDICAL CENTER | Age: 76
End: 2021-03-15
Payer: MEDICARE

## 2021-03-15 VITALS
RESPIRATION RATE: 16 BRPM | BODY MASS INDEX: 34.39 KG/M2 | SYSTOLIC BLOOD PRESSURE: 136 MMHG | HEIGHT: 66 IN | HEART RATE: 99 BPM | WEIGHT: 214 LBS | DIASTOLIC BLOOD PRESSURE: 60 MMHG | OXYGEN SATURATION: 93 %

## 2021-03-15 DIAGNOSIS — Z78.9 NONSMOKER: ICD-10-CM

## 2021-03-15 DIAGNOSIS — G47.33 OSA (OBSTRUCTIVE SLEEP APNEA): Chronic | ICD-10-CM

## 2021-03-15 DIAGNOSIS — J45.30 MILD PERSISTENT ASTHMA WITHOUT COMPLICATION: Chronic | ICD-10-CM

## 2021-03-15 DIAGNOSIS — I10 BENIGN ESSENTIAL HYPERTENSION: Chronic | ICD-10-CM

## 2021-03-15 DIAGNOSIS — E66.9 OBESITY (BMI 30-39.9): ICD-10-CM

## 2021-03-15 PROCEDURE — 99214 OFFICE O/P EST MOD 30 MIN: CPT | Performed by: NURSE PRACTITIONER

## 2021-03-15 ASSESSMENT — FIBROSIS 4 INDEX: FIB4 SCORE: 2.32

## 2021-03-15 NOTE — PROGRESS NOTES
Chief Complaint   Patient presents with   • Follow-Up     BACILIO / Mild persistent asthma without complications // Last Seen 10/29/2020        HPI:  Judy Bland is a 75 y.o. year old female here today for follow-up on BACILIO and asthma.  Last OV 10/29/20  She is accompanied by her .     Past medical history of hypertension, tachycardia, murmur and dyslipidemia.  History of hepatitis B from [employer].  She is a retired RN.  Recent treadmill test 2019 was negative for ischemia but deconditioning noted and hypertensive reaction exercise.     At last OV, BIPAP 15/11cm w/2LPM bleed in O2 started. She notes feeling improved on adjustment in therapy and sleeping better. She denies AM headaches; tolerates mask and pressure well. No napping/accidental dozing occurring.  Compliance report 2/13/21-3/14/21 notes 100% compliance, avg nightly use of 8hr 57min, minimal mask leak with reduced AHI 3.3/hr. Reviewed with patient.  Asthma is well controlled on bronchodilators. She denies any exacerbations since last OV. No change in symptoms.     Sleep hx:  Prior PSG at Dr. Andrade's office 2011 indicated severe sleep apnea with an overall AHI of 38 and a minimum saturation of 67%.   CNOX on Pap 4/19/2019 indicated less than 80% for 593.7 minutes of the night.  Essentially the whole night.  Titration study was recommended but she has continued to decline. She understands risk of nocturnal hypoxia.  She is currently using APAP 9-15cm  Compliance report 6/9-7/8/2020 compliance, average nightly use 8 hours 20 minutes, mean pressure 10 cm, normal mask leak with reduced AHI of 4.1/h.  I reviewed with patient.  She feels her sleep is poor with difficulty initiating and maintaining it. She wakes 1-2x's per night and urinates. Her  has alzheimers who she cares for causing stress.   CNOX indicated basal SPO2 of 87% patient spent 239 minutes less than 88% ox saturation using auto CPAP 10 to 15 cm.  O2 sara of 85%.   Recommendation was titration study.  Titration study 10/8/2020 indicates 85% sleep efficiency, with improved response on BiPAP 15/11 cm with reduced AHI of 0/h and O2 sara of 82%.  Mean SPO2 85%.  Patient did qualify for bleeding O2.      Also follow-up for asthma.  Pulmonary hx:  Nonsmoker.  PFT 12/18/14 indicated FVC 2.05L or 62%, FEV1 1.71L or 68%, FEV1/FVC ratio 84 and DLCO 120% predicted.    PFT 3/15/2019 indicates normal spirometry with FEV1 2.22 L or 94%, FEV1/FVC ratio 83%, TLC 84% and a DLCO 110% predicted.  PFTs 7/9/2020 indicates FVC 2.18 L or 74%, FEV1 1.87 L or 83%, FEV1/FVC ratio 86%, TLC 78% with a DLCO of 91% predicted.  No significant response.  I reviewed finds with patient.  She did try Breo 1 puff daily but not feel it is any more beneficial than Symbicort.   She is compliant with Symbicort 160/4.5mcg 2 puffs BID, Singulair qhs, zyrtec QD, Flonase prn and Ventolin HFA inhaler prn which she rarely uses.    She continues her allergy shot injections.    GERD is controlled on medication.    Last echocardiogram was in 2015 indicating EF 65-70%, RVSP 24-29 mmHg.        ROS: As per HPI and otherwise negative if not stated.    Past Medical History:   Diagnosis Date   • Acquired polycythemia 2/15/2012   • Anesthesia     PONV   • Arthritis    • Arthropathy 2/15/2012   • Benign essential hypertension 2/15/2012   • Chronic hepatitis B (HCC) 2/15/2012   • Cough 2/15/2012   • Difficulty breathing 2/15/2012   • Esophageal reflux 2/15/2012   • Heart burn    • Hepatitis B    • Hypercholesterolemia 2/15/2012   • Hyperlipidemia 2/15/2012   • Hypertension    • Indigestion    • Jaundice    • Murmur 2/15/2012   • Obesity 2/15/2012   • BACILIO (obstructive sleep apnea) 2/15/2012   • Sinus tachycardia 2/15/2012       Past Surgical History:   Procedure Laterality Date   • SEPTOPLASTY  3/12/2012    Performed by MEHDI SWAIN at SURGERY SAME DAY [facility]   • TURBINOPLASTY  3/12/2012    Performed by MEHDI SWAIN at  SURGERY SAME DAY [facility] ORS   • SINUSOTOMIES  3/12/2012    Performed by MEHDI SWAIN at SURGERY SAME DAY [facility] ORS   • TRIGGER FINGER RELEASE  11/23/2009    Performed by MEHDI ONEIL at SURGERY [facility] ORS   • CARPAL TUNNEL RELEASE  2009    b/L   • KNEE ARTHROPLASTY TOTAL  4/8/08    Performed by ROHAN BLACKWELL at SURGERY [facility] ORS   • KNEE ARTHROPLASTY TOTAL  4/8/08    Performed by ROHAN BLACKWELL at SURGERY [facility] ORS   • CATARACT EXTRACTION WITH IOL  2008    OU   • ROTATOR CUFF REPAIR  2005    left   • TRIGGER FINGER RELEASE  2005    left   • CERVICAL CONIZATION      Loop Electrode Excision   • HAND SURGERY     • PRIMARY C SECTION         Family History   Problem Relation Age of Onset   • Other Mother         Family hx of Coronary Arteriosclerosis   • Stroke Mother    • Other Father         Family hx of Coronary Arteriosclerosis   • Heart Attack Father 66       Social History     Socioeconomic History   • Marital status:      Spouse name: Not on file   • Number of children: Not on file   • Years of education: Not on file   • Highest education level: Not on file   Occupational History   • Not on file   Tobacco Use   • Smoking status: Never Smoker   • Smokeless tobacco: Never Used   Substance and Sexual Activity   • Alcohol use: Yes     Alcohol/week: 0.6 oz     Types: 1 Glasses of wine per week     Comment: rarely    • Drug use: No   • Sexual activity: Not Currently   Other Topics Concern   • Not on file   Social History Narrative   • Not on file     Social Determinants of Health     Financial Resource Strain:    • Difficulty of Paying Living Expenses:    Food Insecurity:    • Worried About Running Out of Food in the Last Year:    • Ran Out of Food in the Last Year:    Transportation Needs:    • Lack of Transportation (Medical):    • Lack of Transportation (Non-Medical):    Physical Activity:    • Days of Exercise per Week:    • Minutes of Exercise per Session:    Stress:    •  "Feeling of Stress :    Social Connections:    • Frequency of Communication with Friends and Family:    • Frequency of Social Gatherings with Friends and Family:    • Attends Congregational Services:    • Active Member of Clubs or Organizations:    • Attends Club or Organization Meetings:    • Marital Status:    Intimate Partner Violence:    • Fear of Current or Ex-Partner:    • Emotionally Abused:    • Physically Abused:    • Sexually Abused:        Allergies as of 03/15/2021 - Reviewed 03/15/2021   Allergen Reaction Noted   • Ultram [tramadol hcl]  02/09/2011        Vitals:  /60 (BP Location: Left arm, Patient Position: Sitting, BP Cuff Size: Adult)   Pulse 99   Resp 16   Ht 1.676 m (5' 6\")   Wt 97.1 kg (214 lb)   SpO2 93%     Current medications as of today   Current Outpatient Medications   Medication Sig Dispense Refill   • metFORMIN ER (GLUCOPHAGE XR) 500 MG TABLET SR 24 HR TAKE ONE TABLET BY MOUTH EVERY NIGHT AT BEDTIME 90 tablet 2   • montelukast (SINGULAIR) 10 MG Tab TAKE 1 TABLET DAILY 90 tablet 3   • fluticasone (FLONASE) 50 MCG/ACT nasal spray Administer 1 Spray into affected nostril(S) 2 times a day. Each Nostril 48 g 3   • DILTIAZem (CARDIZEM) 60 MG Tab Take 1 Tab by mouth 2 Times a Day. 180 Tab 1   • nystatin (MYCOSTATIN) 830504 UNIT/GM Cream topical cream      • NYAMYC powder      • propranolol (INDERAL) 20 MG Tab TAKE ONE TABLET BY MOUTH EVERY NIGHT AT BEDTIME 90 Tab 3   • budesonide-formoterol (SYMBICORT) 160-4.5 MCG/ACT Aerosol USE 2 INHALATIONS ORALLY   TWICE DAILY WITH SPACER,   RINSE MOUTH AFTER EACH USE 30.6 g 4   • atorvastatin (LIPITOR) 20 MG Tab TAKE 1 TABLET AT BEDTIME 90 Tab 3   • lisinopril-hydrochlorothiazide (PRINZIDE) 20-12.5 MG per tablet TAKE ONE TABLET BY MOUTH EVERY MORNING 90 Tab 3   • albuterol (VENTOLIN HFA) 108 (90 Base) MCG/ACT Aero Soln inhalation aerosol Inhale 2 Puffs by mouth every four hours as needed for Shortness of Breath. 1 Inhaler 11   • meloxicam (MOBIC) 15 MG " tablet      • RESTASIS 0.05 % ophthalmic emulsion Place 1 Drop in both eyes 2 times a day.     • doxepin (SINEQUAN) 25 MG Cap Take 25 mg by mouth every day.     • Cetirizine HCl (ZYRTEC PO) Take 1 Each by mouth every day.     • pantoprazole (PROTONIX) 40 MG TBEC Take 40 mg by mouth 2 times a day.       • Acetaminophen (TYLENOL PO) Take  by mouth. EXTRA STRENGH 2 TABS PRN        No current facility-administered medications for this visit.         Physical Exam:   Gen:           Alert and oriented, No apparent distress. Mood and affect appropriate, normal interaction with examiner.  Eyes:          PERRL, EOM intact, sclere white, conjunctive moist.  Ears:          Not examined.   Hearing:     Grossly intact.  Nose:          Normal, no lesions or deformities.  Dentition:    mask  Oropharynx:   mask  Mallampati Classification: mask  Neck:        Supple, trachea midline, no masses.  Respiratory Effort: No intercostal retractions or use of accessory muscles.   Lung Auscultation:      Clear to auscultation bilaterally; no rales, rhonchi or wheezing.  CV:            Regular rate and rhythm. No murmurs, rubs or gallops.  Abd:           Not examined.   Lymphadenopathy: Not examined.  Gait and Station: Normal.  Digits and Nails: No clubbing, cyanosis, petechiae, or nodes.   Cranial Nerves: II-XII grossly intact.  Skin:        No rashes, lesions or ulcers noted.               Ext:           No cyanosis or edema.      Assessment:  1. BACILIO (obstructive sleep apnea)  DME Mask and Supplies   2. Mild persistent asthma without complication  Spirometry   3. Benign essential hypertension     4. Obesity (BMI 30-39.9)  Height And Weight   5. Nonsmoker         Immunizations:    Flu:10/2020  Pneumovax 23:2019  Prevnar 13:2017  COVID-19: 3/6/21, 2/16/21    Plan:  1. BACILIO is well treated. Continue current settings. Consider repeat overnight oximetry on PAP/O@ at next OV.  DME mask/supplies.  2. Discussed sleep and respiratory hygiene.  3.  Continue current bronchodilators for asthma; asthma is clinically stable.  4. Encouraged weight loss through diet/exercise  5. F/u with PCP for other health concerns  6. F/u in 6 mos with octavio/compliance check, sooner if needed.    Please note that this dictation was created using voice recognition software. I have made every reasonable attempt to correct obvious errors, but it is possible there are errors of grammar and possibly content that I did not discover before finalizing the note.

## 2021-03-26 ENCOUNTER — RX ONLY (OUTPATIENT)
Age: 76
Setting detail: RX ONLY
End: 2021-03-26

## 2021-03-26 RX ORDER — DOXEPIN HYDROCHLORIDE 25 MG/1
CAPSULE ORAL QD
Qty: 90 | Refills: 5 | Status: ERX

## 2021-05-17 RX ORDER — LISINOPRIL AND HYDROCHLOROTHIAZIDE 20; 12.5 MG/1; MG/1
TABLET ORAL
Qty: 90 TABLET | Refills: 3 | Status: SHIPPED | OUTPATIENT
Start: 2021-05-17 | End: 2022-06-15

## 2021-05-17 RX ORDER — LISINOPRIL AND HYDROCHLOROTHIAZIDE 20; 12.5 MG/1; MG/1
TABLET ORAL
Qty: 90 TABLET | Refills: 2 | Status: SHIPPED | OUTPATIENT
Start: 2021-05-17 | End: 2021-05-17

## 2021-05-25 DIAGNOSIS — I10 ESSENTIAL HYPERTENSION: ICD-10-CM

## 2021-06-01 RX ORDER — DILTIAZEM HYDROCHLORIDE 60 MG/1
TABLET, FILM COATED ORAL
Qty: 180 TABLET | Refills: 3 | Status: SHIPPED | OUTPATIENT
Start: 2021-06-01 | End: 2022-07-11

## 2021-06-11 ENCOUNTER — TELEPHONE (OUTPATIENT)
Dept: SLEEP MEDICINE | Facility: MEDICAL CENTER | Age: 76
End: 2021-06-11

## 2021-06-11 DIAGNOSIS — J45.909 UNCOMPLICATED ASTHMA, UNSPECIFIED ASTHMA SEVERITY, UNSPECIFIED WHETHER PERSISTENT: ICD-10-CM

## 2021-06-11 RX ORDER — ALBUTEROL SULFATE 90 UG/1
2 AEROSOL, METERED RESPIRATORY (INHALATION) EVERY 4 HOURS PRN
Qty: 1 EACH | Refills: 11 | Status: SHIPPED | OUTPATIENT
Start: 2021-06-11 | End: 2022-08-05 | Stop reason: SDUPTHER

## 2021-06-11 NOTE — TELEPHONE ENCOUNTER
Have we ever prescribed this med? Yes.  If yes, what date? 3/18/19    Last OV: 3/15/21    Next OV: 9/16/21    DX: Shortness of breath    Medications: Albuterol inhaler HFA to Volodym[facility] Montenegro requested.

## 2021-06-14 ENCOUNTER — HOSPITAL ENCOUNTER (OUTPATIENT)
Facility: MEDICAL CENTER | Age: 76
End: 2021-06-14
Attending: PHYSICIAN ASSISTANT
Payer: MEDICARE

## 2021-06-14 ENCOUNTER — OFFICE VISIT (OUTPATIENT)
Dept: URGENT CARE | Facility: PHYSICIAN GROUP | Age: 76
End: 2021-06-14
Payer: MEDICARE

## 2021-06-14 VITALS
DIASTOLIC BLOOD PRESSURE: 60 MMHG | HEIGHT: 66 IN | BODY MASS INDEX: 33.75 KG/M2 | OXYGEN SATURATION: 93 % | HEART RATE: 85 BPM | RESPIRATION RATE: 14 BRPM | TEMPERATURE: 97 F | SYSTOLIC BLOOD PRESSURE: 116 MMHG | WEIGHT: 210 LBS

## 2021-06-14 DIAGNOSIS — R35.0 URINARY FREQUENCY: ICD-10-CM

## 2021-06-14 DIAGNOSIS — R39.15 URINARY URGENCY: ICD-10-CM

## 2021-06-14 DIAGNOSIS — R30.0 DYSURIA: ICD-10-CM

## 2021-06-14 PROCEDURE — 87086 URINE CULTURE/COLONY COUNT: CPT

## 2021-06-14 PROCEDURE — 99213 OFFICE O/P EST LOW 20 MIN: CPT | Performed by: PHYSICIAN ASSISTANT

## 2021-06-14 RX ORDER — TRIAMCINOLONE ACETONIDE 1 MG/G
CREAM TOPICAL PRN
COMMUNITY
Start: 2021-06-02

## 2021-06-14 RX ORDER — CEFDINIR 300 MG/1
300 CAPSULE ORAL 2 TIMES DAILY
Qty: 10 CAPSULE | Refills: 0 | Status: SHIPPED | OUTPATIENT
Start: 2021-06-14 | End: 2021-06-19

## 2021-06-14 ASSESSMENT — ENCOUNTER SYMPTOMS
CHILLS: 0
ABDOMINAL PAIN: 0
FEVER: 0
VOMITING: 0
FLANK PAIN: 0
RESPIRATORY NEGATIVE: 1
DIARRHEA: 0
CARDIOVASCULAR NEGATIVE: 1

## 2021-06-14 ASSESSMENT — FIBROSIS 4 INDEX: FIB4 SCORE: 2.32

## 2021-06-14 NOTE — PROGRESS NOTES
Subjective:   Judy Bland is a 75 y.o. female who presents for Urinary Frequency (x2days )    HPI  Patient presents the clinic with concerns of a UTI.  History of UTI and this feels similar.  Onset of symptoms a couple days ago with urinary frequency, dysuria, and urinary urgency.  Over-the-counter Azo with significant relief of her dysuria.  She denies any other complaints or concerns.  She denies any fever, chills, abdominal pain, nausea, vomiting, flank pain.  Denies a history of pyelonephritis.      Review of Systems   Constitutional: Negative for chills and fever.   Respiratory: Negative.    Cardiovascular: Negative.    Gastrointestinal: Negative for abdominal pain, diarrhea and vomiting.   Genitourinary: Positive for dysuria, frequency and urgency. Negative for flank pain.       Medications:    • albuterol Aers  • ALPRAZolam Tabs  • atorvastatin Tabs  • budesonide-formoterol Aero  • DILTIAZem Tabs  • doxepin Caps  • fluticasone  • lisinopril-hydrochlorothiazide  • meloxicam  • metFORMIN ER Tb24  • montelukast Tabs  • Nyamyc Powd  • nystatin Crea  • pantoprazole Tbec  • propranolol Tabs  • Restasis Emul  • TYLENOL PO  • ZYRTEC PO    Allergies: Ultram [tramadol hcl]    Problem List: Judy Bland does not have any pertinent problems on file.    Surgical History:  Past Surgical History:   Procedure Laterality Date   • SEPTOPLASTY  3/12/2012    Performed by MEHDI SWAIN at SURGERY SAME DAY [facility] ORS   • TURBINOPLASTY  3/12/2012    Performed by MEHDI SWAIN at SURGERY SAME DAY [facility] ORS   • SINUSOTOMIES  3/12/2012    Performed by MEHDI SWAIN at SURGERY SAME DAY [facility] ORS   • TRIGGER FINGER RELEASE  11/23/2009    Performed by MEHDI ONEIL at SURGERY [facility] ORS   • CARPAL TUNNEL RELEASE  2009    b/L   • KNEE ARTHROPLASTY TOTAL  4/8/08    Performed by ROHAN BLACKWELL at SURGERY [facility] ORS   • KNEE ARTHROPLASTY TOTAL  4/8/08    Performed by ROHAN BLACKWELL at SURGERY  [facility]S   • CATARACT EXTRACTION WITH IOL  2008    OU   • ROTATOR CUFF REPAIR  2005    left   • TRIGGER FINGER RELEASE  2005    left   • CERVICAL CONIZATION      Loop Electrode Excision   • HAND SURGERY     • PRIMARY C SECTION         Past Social Hx: Judy Bland  reports that she has never smoked. She has never used smokeless tobacco. She reports current alcohol use of about 0.6 oz of alcohol per week. She reports that she does not use drugs.     Past Family Hx:  Judy Bland family history includes Heart Attack (age of onset: 66) in her father; Other in her father and mother; Stroke in her mother.     Problem list, medications, and allergies reviewed by myself today in Epic.     Objective:     /60   Pulse 85   Temp 36.1 °C (97 °F) (Temporal)   Resp 14   Ht 1.676 m (5' 6\")   Wt 95.3 kg (210 lb)   SpO2 93%   BMI 33.89 kg/m²     Physical Exam  Vitals reviewed.   Constitutional:       General: She is not in acute distress.     Appearance: Normal appearance. She is not ill-appearing or toxic-appearing.   HENT:      Head: Normocephalic.   Eyes:      Conjunctiva/sclera: Conjunctivae normal.      Pupils: Pupils are equal, round, and reactive to light.   Cardiovascular:      Rate and Rhythm: Normal rate and regular rhythm.      Heart sounds: Normal heart sounds.   Pulmonary:      Effort: Pulmonary effort is normal. No respiratory distress.      Breath sounds: Normal breath sounds. No wheezing, rhonchi or rales.   Abdominal:      General: Abdomen is flat. Bowel sounds are normal. There is no distension.      Palpations: Abdomen is soft. There is no mass.      Tenderness: There is no abdominal tenderness. There is no right CVA tenderness, left CVA tenderness, guarding or rebound.   Musculoskeletal:      Cervical back: Neck supple.   Lymphadenopathy:      Cervical: No cervical adenopathy.   Skin:     General: Skin is warm and dry.   Neurological:      General: No focal deficit present.    "   Mental Status: She is alert and oriented to person, place, and time.   Psychiatric:         Mood and Affect: Mood normal.         Behavior: Behavior normal.           Assessment/Associated Orders     1. Dysuria  cefdinir (OMNICEF) 300 MG Cap    URINE CULTURE(NEW)   2. Urinary frequency  cefdinir (OMNICEF) 300 MG Cap    URINE CULTURE(NEW)   3. Urinary urgency  cefdinir (OMNICEF) 300 MG Cap    URINE CULTURE(NEW)       Medical Decision Making      UA inaccurate secondary to AZO.   Discussed with patient signs and symptoms are consistent with a simple urinary tract infection.   They are overall very well-appearing with normal vital signs and benign examination findings.     Discussed treatment of cefdinir for 5 days, increased fluid intake, AZO per manufacturers instructions for burning urination.  Urine culture: will call back only if positive and if necessary change in therapy.     Advised to return to the Urgent Care or follow up with their PCP if symptoms are not improving in 2-3 days or sooner if any worsening symptoms such as fever, chills, abdominal pain, back/flank pain, nausea, vomiting, or any other concerns.     I personally reviewed prior external notes and test results pertinent to today's visit. Red flags discussed. Supportive care, natural history, differential diagnoses, and indications for immediate follow-up discussed. Patient expresses understanding and agrees to plan. Patient denies any other questions or concerns.     Advised the patient to follow-up with the primary care physician for recheck, reevaluation, and consideration of further management.    Please note that this dictation was created using voice recognition software. I have made a reasonable attempt to correct obvious errors, but I expect that there are errors of grammar and possibly content that I did not discover before finalizing the note.    This note was electronically signed by Shon Dugan PA-C

## 2021-06-16 LAB
BACTERIA UR CULT: NORMAL
SIGNIFICANT IND 70042: NORMAL
SITE SITE: NORMAL
SOURCE SOURCE: NORMAL

## 2021-06-25 DIAGNOSIS — J45.909 UNCOMPLICATED ASTHMA, UNSPECIFIED ASTHMA SEVERITY, UNSPECIFIED WHETHER PERSISTENT: ICD-10-CM

## 2021-06-25 RX ORDER — BUDESONIDE AND FORMOTEROL FUMARATE DIHYDRATE 160; 4.5 UG/1; UG/1
AEROSOL RESPIRATORY (INHALATION)
Qty: 3 EACH | Refills: 3 | Status: SHIPPED | OUTPATIENT
Start: 2021-06-25

## 2021-06-25 NOTE — TELEPHONE ENCOUNTER
Have we ever prescribed this med? Yes.  If yes, what date? 08/17/2020    Last OV: 03/15/2021 - Deidra Sr    Next OV: 09/16/2021 - Deidra Sr    DX: Asthma    Medications: Symbicort

## 2021-07-13 DIAGNOSIS — F41.9 ANXIETY: ICD-10-CM

## 2021-07-14 ENCOUNTER — TELEPHONE (OUTPATIENT)
Dept: SLEEP MEDICINE | Facility: MEDICAL CENTER | Age: 76
End: 2021-07-14

## 2021-07-14 RX ORDER — ALPRAZOLAM 0.5 MG/1
TABLET ORAL
Qty: 30 TABLET | Refills: 0 | Status: SHIPPED | OUTPATIENT
Start: 2021-07-14 | End: 2021-08-13

## 2021-07-14 NOTE — TELEPHONE ENCOUNTER
Patient LVM requesting a prescription for Pantropazole 10mg. She states she has tried other things but this seems to work the best.   Please order if appropriate

## 2021-07-15 RX ORDER — PANTOPRAZOLE SODIUM 40 MG/1
40 TABLET, DELAYED RELEASE ORAL DAILY
Qty: 90 TABLET | Refills: 1 | Status: SHIPPED | OUTPATIENT
Start: 2021-07-15 | End: 2022-03-22

## 2021-08-17 ENCOUNTER — OFFICE VISIT (OUTPATIENT)
Dept: MEDICAL GROUP | Facility: MEDICAL CENTER | Age: 76
End: 2021-08-17
Payer: MEDICARE

## 2021-08-17 VITALS
SYSTOLIC BLOOD PRESSURE: 120 MMHG | DIASTOLIC BLOOD PRESSURE: 60 MMHG | BODY MASS INDEX: 33.59 KG/M2 | WEIGHT: 209 LBS | RESPIRATION RATE: 16 BRPM | TEMPERATURE: 97.8 F | HEIGHT: 66 IN | OXYGEN SATURATION: 91 % | HEART RATE: 93 BPM

## 2021-08-17 DIAGNOSIS — I10 BENIGN ESSENTIAL HYPERTENSION: ICD-10-CM

## 2021-08-17 DIAGNOSIS — E11.9 TYPE 2 DIABETES MELLITUS WITHOUT COMPLICATION, WITHOUT LONG-TERM CURRENT USE OF INSULIN (HCC): ICD-10-CM

## 2021-08-17 DIAGNOSIS — F41.9 ANXIETY: ICD-10-CM

## 2021-08-17 LAB
HBA1C MFR BLD: 7.5 % (ref 0–5.6)
INT CON NEG: NEGATIVE
INT CON POS: POSITIVE

## 2021-08-17 PROCEDURE — 83036 HEMOGLOBIN GLYCOSYLATED A1C: CPT | Performed by: FAMILY MEDICINE

## 2021-08-17 PROCEDURE — 99214 OFFICE O/P EST MOD 30 MIN: CPT | Performed by: FAMILY MEDICINE

## 2021-08-17 RX ORDER — METFORMIN HYDROCHLORIDE 500 MG/1
1000 TABLET, EXTENDED RELEASE ORAL DAILY
Qty: 180 TABLET | Refills: 2 | Status: SHIPPED | OUTPATIENT
Start: 2021-08-17 | End: 2022-05-17 | Stop reason: SDUPTHER

## 2021-08-17 RX ORDER — ALPRAZOLAM 0.5 MG/1
TABLET ORAL
Qty: 30 TABLET | Refills: 0 | Status: SHIPPED | OUTPATIENT
Start: 2021-08-17 | End: 2021-09-09 | Stop reason: SDUPTHER

## 2021-08-17 RX ORDER — ALPRAZOLAM 0.5 MG/1
TABLET ORAL
Qty: 30 TABLET | Refills: 0 | Status: SHIPPED | OUTPATIENT
Start: 2021-08-17 | End: 2021-08-17 | Stop reason: SDUPTHER

## 2021-08-17 ASSESSMENT — FIBROSIS 4 INDEX: FIB4 SCORE: 2.32

## 2021-08-17 ASSESSMENT — PATIENT HEALTH QUESTIONNAIRE - PHQ9: CLINICAL INTERPRETATION OF PHQ2 SCORE: 0

## 2021-08-17 NOTE — PROGRESS NOTES
CC: Anxiety(needs medication refill), uncontrolled diabetes, hypertension    HPI:   Judy is Jessi Chamallorie's patient, presents today to discuss the following medical issues:    Anxiety  Patient is here to refill her Xanax.  She has been on Xanax 0.5 mg daily.  reviewed, last prescription was picked up on 7/14.    Type 2 diabetes mellitus without complication, without long-term current use of insulin ([facility])  A1c is 7.5, it was 6.6 last visit.  Has been asymptomatic.  Due for monofilament foot exam and microalbuminuria screen.  She is currently on Metformin  mg daily.    Benign essential hypertension  Blood pressure has been adequately controlled on Lisinopril-hydrochlorothiazide 20-12.5 mg daily.  Denies headache, chest pain, shortness of breath.      Patient Active Problem List    Diagnosis Date Noted   • Coronary artery calcification seen on CAT scan 06/06/2019   • Coronary artery disease due to calcified coronary lesion 06/06/2019   • Chronic midline low back pain without sciatica 08/14/2018   • Spondylosis of cervical region without myelopathy or radiculopathy 08/14/2018   • Type 2 diabetes mellitus without complication, without long-term current use of insulin ([facility]) 05/10/2018   • Elevated LFTs 03/23/2018   • Obesity (BMI 30-39.9) 07/26/2017   • Mild persistent asthma without complication 07/26/2017   • LLOYD (dyspnea on exertion) 03/16/2017   • Allergic rhinitis due to allergen 06/13/2016   • Acquired polycythemia 02/15/2012   • Benign essential hypertension 02/15/2012   • Murmur 02/15/2012   • BACILIO (obstructive sleep apnea) 02/15/2012   • Sinus tachycardia 02/15/2012   • Arthropathy 02/15/2012   • Chronic hepatitis B (HCC) 02/15/2012   • Esophageal reflux 02/15/2012   • Dyslipidemia 02/15/2012       Current Outpatient Medications   Medication Sig Dispense Refill   • ALPRAZolam (XANAX) 0.5 MG Tab TAKE ONE TABLET BY MOUTH DAILY AS NEEDED FOR ANXIETY 30 Tablet 0   • metFORMIN ER (GLUCOPHAGE XR) 500  MG TABLET SR 24 HR Take 2 Tablets by mouth every day. 180 Tablet 2   • pantoprazole (PROTONIX) 40 MG Tablet Delayed Response Take 1 tablet by mouth every day. 90 tablet 1   • budesonide-formoterol (SYMBICORT) 160-4.5 MCG/ACT Aerosol USE 2 INHALATIONS ORALLY   TWICE DAILY WITH SPACER,   RINSE MOUTH AFTER EACH USE 3 Each 3   • triamcinolone acetonide (KENALOG) 0.1 % Cream      • albuterol (VENTOLIN HFA) 108 (90 Base) MCG/ACT Aero Soln inhalation aerosol Inhale 2 Puffs every four hours as needed for Shortness of Breath. 1 Each 11   • DILTIAZem (CARDIZEM) 60 MG Tab TAKE 1 TABLET TWICE A  tablet 3   • atorvastatin (LIPITOR) 20 MG Tab TAKE 1 TABLET AT BEDTIME 90 tablet 1   • lisinopril-hydrochlorothiazide (PRINZIDE) 20-12.5 MG per tablet TAKE ONE TABLET BY MOUTH EVERY MORNING 90 tablet 3   • montelukast (SINGULAIR) 10 MG Tab TAKE 1 TABLET DAILY 90 tablet 3   • fluticasone (FLONASE) 50 MCG/ACT nasal spray Administer 1 Spray into affected nostril(S) 2 times a day. Each Nostril 48 g 3   • nystatin (MYCOSTATIN) 707299 UNIT/GM Cream topical cream      • NYAMYC powder      • propranolol (INDERAL) 20 MG Tab TAKE ONE TABLET BY MOUTH EVERY NIGHT AT BEDTIME 90 Tab 3   • meloxicam (MOBIC) 15 MG tablet      • RESTASIS 0.05 % ophthalmic emulsion Place 1 Drop in both eyes 2 times a day.     • doxepin (SINEQUAN) 25 MG Cap Take 25 mg by mouth every day.     • Cetirizine HCl (ZYRTEC PO) Take 1 Each by mouth every day.     • Acetaminophen (TYLENOL PO) Take  by mouth. EXTRA STRENGH 2 TABS PRN        No current facility-administered medications for this visit.         Allergies as of 08/17/2021 - Reviewed 08/17/2021   Allergen Reaction Noted   • Ultram [tramadol hcl]  02/09/2011        ROS: Denies any chest pain, Shortness of breath, Changes bowel or bladder, Lower extremity edema.    Physical Exam:  /60 (BP Location: Right arm, Patient Position: Sitting, BP Cuff Size: Adult)   Pulse 93   Temp 36.6 °C (97.8 °F) (Temporal)    "Resp 16   Ht 1.676 m (5' 6\")   Wt 94.8 kg (209 lb)   SpO2 91%   BMI 33.73 kg/m²   Gen.: Well-developed, well-nourished, no apparent distress,pleasant and cooperative with the examination  Skin:  Warm and dry with good turgor. No rashes or suspicious lesions in visible areas  HEENT:Sinuses nontender with palpation, TMs clear, nares patent with pink mucosa and clear rhinorrhea,no septal deviation ,polyps or lesions. lips without lesions, oropharynx clear.  Neck: Trachea midline,no masses or adenopathy. No JVD.  Cor: Regular rate and rhythm without murmur, gallop or rub.  Lungs: Respirations unlabored.Clear to auscultation with equal breath sounds bilaterally. No wheezes, rhonchi.  Extremities: No cyanosis, clubbing or edema.          Assessment and Plan.   75 y.o. female     1. Anxiety  Stable.     reviewed, last prescription was picked up on 7/14.  Medication refilled for 1 month.  Patient advised to follow-up with primary care for future refills.    - ALPRAZolam (XANAX) 0.5 MG Tab; TAKE ONE TABLET BY MOUTH DAILY AS NEEDED FOR ANXIETY  Dispense: 30 Tablet; Refill: 0    2. Type 2 diabetes mellitus without complication, without long-term current use of insulin (HCC)  Uncontrolled.  A1c is 7.5, it was 6.6 last visit.  Advised to increase Metformin to 1000 mg daily    - POCT  A1C  - CBC WITH DIFFERENTIAL; Future  - Comp Metabolic Panel; Future  - Lipid Profile; Future  - MICROALBUMIN CREAT RATIO URINE; Future    3. Benign essential hypertension  Controlled.  Lisinopril-hydrochlorothiazide 20-12.5 mg daily.    - CBC WITH DIFFERENTIAL; Future  - Comp Metabolic Panel; Future  - Lipid Profile; Future  - MICROALBUMIN CREAT RATIO URINE; Future  - TSH; Future      "

## 2021-09-02 ENCOUNTER — HOSPITAL ENCOUNTER (OUTPATIENT)
Dept: LAB | Facility: MEDICAL CENTER | Age: 76
End: 2021-09-02
Attending: FAMILY MEDICINE
Payer: MEDICARE

## 2021-09-02 DIAGNOSIS — I10 BENIGN ESSENTIAL HYPERTENSION: ICD-10-CM

## 2021-09-02 DIAGNOSIS — E11.9 TYPE 2 DIABETES MELLITUS WITHOUT COMPLICATION, WITHOUT LONG-TERM CURRENT USE OF INSULIN (HCC): ICD-10-CM

## 2021-09-02 LAB
ALBUMIN SERPL BCP-MCNC: 4.4 G/DL (ref 3.2–4.9)
ALBUMIN/GLOB SERPL: 1.6 G/DL
ALP SERPL-CCNC: 102 U/L (ref 30–99)
ALT SERPL-CCNC: 55 U/L (ref 2–50)
ANION GAP SERPL CALC-SCNC: 14 MMOL/L (ref 7–16)
AST SERPL-CCNC: 45 U/L (ref 12–45)
BASOPHILS # BLD AUTO: 1 % (ref 0–1.8)
BASOPHILS # BLD: 0.08 K/UL (ref 0–0.12)
BILIRUB SERPL-MCNC: 0.4 MG/DL (ref 0.1–1.5)
BUN SERPL-MCNC: 16 MG/DL (ref 8–22)
CALCIUM SERPL-MCNC: 9.5 MG/DL (ref 8.5–10.5)
CHLORIDE SERPL-SCNC: 104 MMOL/L (ref 96–112)
CHOLEST SERPL-MCNC: 148 MG/DL (ref 100–199)
CO2 SERPL-SCNC: 22 MMOL/L (ref 20–33)
CREAT SERPL-MCNC: 0.56 MG/DL (ref 0.5–1.4)
EOSINOPHIL # BLD AUTO: 0.23 K/UL (ref 0–0.51)
EOSINOPHIL NFR BLD: 2.8 % (ref 0–6.9)
ERYTHROCYTE [DISTWIDTH] IN BLOOD BY AUTOMATED COUNT: 49.6 FL (ref 35.9–50)
FASTING STATUS PATIENT QL REPORTED: NORMAL
GLOBULIN SER CALC-MCNC: 2.7 G/DL (ref 1.9–3.5)
GLUCOSE SERPL-MCNC: 149 MG/DL (ref 65–99)
HCT VFR BLD AUTO: 47.1 % (ref 37–47)
HDLC SERPL-MCNC: 43 MG/DL
HGB BLD-MCNC: 15.1 G/DL (ref 12–16)
IMM GRANULOCYTES # BLD AUTO: 0.03 K/UL (ref 0–0.11)
IMM GRANULOCYTES NFR BLD AUTO: 0.4 % (ref 0–0.9)
LDLC SERPL CALC-MCNC: 75 MG/DL
LYMPHOCYTES # BLD AUTO: 2.61 K/UL (ref 1–4.8)
LYMPHOCYTES NFR BLD: 31.4 % (ref 22–41)
MCH RBC QN AUTO: 30.7 PG (ref 27–33)
MCHC RBC AUTO-ENTMCNC: 32.1 G/DL (ref 33.6–35)
MCV RBC AUTO: 95.7 FL (ref 81.4–97.8)
MONOCYTES # BLD AUTO: 0.49 K/UL (ref 0–0.85)
MONOCYTES NFR BLD AUTO: 5.9 % (ref 0–13.4)
NEUTROPHILS # BLD AUTO: 4.87 K/UL (ref 2–7.15)
NEUTROPHILS NFR BLD: 58.5 % (ref 44–72)
NRBC # BLD AUTO: 0 K/UL
NRBC BLD-RTO: 0 /100 WBC
PLATELET # BLD AUTO: 218 K/UL (ref 164–446)
PMV BLD AUTO: 11.9 FL (ref 9–12.9)
POTASSIUM SERPL-SCNC: 4 MMOL/L (ref 3.6–5.5)
PROT SERPL-MCNC: 7.1 G/DL (ref 6–8.2)
RBC # BLD AUTO: 4.92 M/UL (ref 4.2–5.4)
SODIUM SERPL-SCNC: 140 MMOL/L (ref 135–145)
TRIGL SERPL-MCNC: 152 MG/DL (ref 0–149)
TSH SERPL DL<=0.005 MIU/L-ACNC: 1.93 UIU/ML (ref 0.38–5.33)
WBC # BLD AUTO: 8.3 K/UL (ref 4.8–10.8)

## 2021-09-02 PROCEDURE — 82570 ASSAY OF URINE CREATININE: CPT

## 2021-09-02 PROCEDURE — 36415 COLL VENOUS BLD VENIPUNCTURE: CPT

## 2021-09-02 PROCEDURE — 85025 COMPLETE CBC W/AUTO DIFF WBC: CPT

## 2021-09-02 PROCEDURE — 80053 COMPREHEN METABOLIC PANEL: CPT

## 2021-09-02 PROCEDURE — 80061 LIPID PANEL: CPT

## 2021-09-02 PROCEDURE — 82043 UR ALBUMIN QUANTITATIVE: CPT

## 2021-09-02 PROCEDURE — 84443 ASSAY THYROID STIM HORMONE: CPT

## 2021-09-03 LAB
CREAT UR-MCNC: 52 MG/DL
MICROALBUMIN UR-MCNC: <1.2 MG/DL
MICROALBUMIN/CREAT UR: NORMAL MG/G (ref 0–30)

## 2021-09-09 ENCOUNTER — OFFICE VISIT (OUTPATIENT)
Dept: MEDICAL GROUP | Facility: MEDICAL CENTER | Age: 76
End: 2021-09-09
Payer: MEDICARE

## 2021-09-09 VITALS
DIASTOLIC BLOOD PRESSURE: 62 MMHG | TEMPERATURE: 97 F | HEIGHT: 66 IN | WEIGHT: 208 LBS | BODY MASS INDEX: 33.43 KG/M2 | HEART RATE: 92 BPM | OXYGEN SATURATION: 93 % | SYSTOLIC BLOOD PRESSURE: 114 MMHG

## 2021-09-09 DIAGNOSIS — F41.9 ANXIETY: ICD-10-CM

## 2021-09-09 DIAGNOSIS — Z99.81 CHRONIC RESPIRATORY FAILURE WITH HYPOXIA, ON HOME O2 THERAPY (HCC): ICD-10-CM

## 2021-09-09 DIAGNOSIS — E78.5 DYSLIPIDEMIA: ICD-10-CM

## 2021-09-09 DIAGNOSIS — Z78.0 POSTMENOPAUSAL: ICD-10-CM

## 2021-09-09 DIAGNOSIS — Z12.31 ENCOUNTER FOR SCREENING MAMMOGRAM FOR BREAST CANCER: ICD-10-CM

## 2021-09-09 DIAGNOSIS — E11.9 TYPE 2 DIABETES MELLITUS WITHOUT COMPLICATION, WITHOUT LONG-TERM CURRENT USE OF INSULIN (HCC): ICD-10-CM

## 2021-09-09 DIAGNOSIS — G47.9 SLEEP DISTURBANCES: ICD-10-CM

## 2021-09-09 DIAGNOSIS — J96.10 CHRONIC RESPIRATORY FAILURE REQUIRING USE OF NOCTURNAL BILEVEL POSITIVE AIRWAY PRESSURE (BPAP) BY MASK (HCC): ICD-10-CM

## 2021-09-09 DIAGNOSIS — J96.11 CHRONIC RESPIRATORY FAILURE WITH HYPOXIA, ON HOME O2 THERAPY (HCC): ICD-10-CM

## 2021-09-09 DIAGNOSIS — Z12.11 SCREEN FOR COLON CANCER: ICD-10-CM

## 2021-09-09 DIAGNOSIS — Z99.89 CHRONIC RESPIRATORY FAILURE REQUIRING USE OF NOCTURNAL BILEVEL POSITIVE AIRWAY PRESSURE (BPAP) BY MASK (HCC): ICD-10-CM

## 2021-09-09 PROCEDURE — 99214 OFFICE O/P EST MOD 30 MIN: CPT | Performed by: NURSE PRACTITIONER

## 2021-09-09 RX ORDER — ALPRAZOLAM 0.5 MG/1
0.5 TABLET ORAL
Qty: 30 TABLET | Refills: 5 | Status: SHIPPED | OUTPATIENT
Start: 2021-09-14 | End: 2022-03-18 | Stop reason: SDUPTHER

## 2021-09-09 ASSESSMENT — FIBROSIS 4 INDEX: FIB4 SCORE: 2.12

## 2021-09-09 NOTE — PROGRESS NOTES
Chief Complaint   Patient presents with   • Lab Results     DOS: 9/2/2021   • Anxiety     Fv       Subjective:     HPI:     Judy Bland is a 76 y.o. female here to discuss the evaluation and management of:    1. Anxiety  Chronic. Taking Xanax for this. Needs refills.  reviewed. Following up every 6 months for this. 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  She feels like his symptoms are progressing.    2. Postmenopausal  Due for bone density.     3. Type 2 diabetes mellitus without complication, without long-term current use of insulin (HCC)  Recent A1c is 7.5%. Metformin was recently increased to 1,000 mg daily. No GI side effects.     4. Sleep disturbances  Ongoing issue. Has trouble falling asleep. States she has anxiety and is too warm at night. Melatonin is not helpful. Has tried several OTC products without continued success. Primary anxiety and increased body temperature are contributing. TSH at 1.930.     5. Benign essential hypertension  Controlled on lisinopril/hctz and diltezam. Last GFR >60.     6. Dyslipidemia  On statin therapy. No myalgias.     7. Encounter for screening mammogram for breast cancer  Due for mammogram.     8. Screen for colon cancer  Hx of Colon polyps. Will have colonoscopy next June.     9. BACILIO-chronic use of oxygen therapy  She is using Bipap with 2 L of oxygen bled in nightly. She is compliant with this. She follows closely with Pulmonology.      ROS:  Denies any Headache, Blurred Vision, Confusion, Chest pain,  Shortness of breath,  Abdominal pain, Changes of bowel or bladder, Lower ext edema, Fevers, Nights sweats, Weight Changes, Focal weakness or numbness.  And all other systems reviewed and are all negative. POSITIVE FOR : see above        Current Outpatient Medications:   •  ALPRAZolam (XANAX) 0.5 MG Tab, Take 1 Tablet by mouth one time as needed for Anxiety for up to 30  doses., Disp: 30 Tablet, Rfl: 5  •  metFORMIN ER (GLUCOPHAGE XR) 500 MG TABLET SR 24 HR, Take 2 Tablets by mouth every day., Disp: 180 Tablet, Rfl: 2  •  pantoprazole (PROTONIX) 40 MG Tablet Delayed Response, Take 1 tablet by mouth every day., Disp: 90 tablet, Rfl: 1  •  budesonide-formoterol (SYMBICORT) 160-4.5 MCG/ACT Aerosol, USE 2 INHALATIONS ORALLY   TWICE DAILY WITH SPACER,   RINSE MOUTH AFTER EACH USE, Disp: 3 Each, Rfl: 3  •  triamcinolone acetonide (KENALOG) 0.1 % Cream, , Disp: , Rfl:   •  albuterol (VENTOLIN HFA) 108 (90 Base) MCG/ACT Aero Soln inhalation aerosol, Inhale 2 Puffs every four hours as needed for Shortness of Breath., Disp: 1 Each, Rfl: 11  •  DILTIAZem (CARDIZEM) 60 MG Tab, TAKE 1 TABLET TWICE A DAY, Disp: 180 tablet, Rfl: 3  •  atorvastatin (LIPITOR) 20 MG Tab, TAKE 1 TABLET AT BEDTIME, Disp: 90 tablet, Rfl: 1  •  lisinopril-hydrochlorothiazide (PRINZIDE) 20-12.5 MG per tablet, TAKE ONE TABLET BY MOUTH EVERY MORNING, Disp: 90 tablet, Rfl: 3  •  montelukast (SINGULAIR) 10 MG Tab, TAKE 1 TABLET DAILY, Disp: 90 tablet, Rfl: 3  •  fluticasone (FLONASE) 50 MCG/ACT nasal spray, Administer 1 Spray into affected nostril(S) 2 times a day. Each Nostril, Disp: 48 g, Rfl: 3  •  nystatin (MYCOSTATIN) 556953 UNIT/GM Cream topical cream, , Disp: , Rfl:   •  propranolol (INDERAL) 20 MG Tab, TAKE ONE TABLET BY MOUTH EVERY NIGHT AT BEDTIME, Disp: 90 Tab, Rfl: 3  •  meloxicam (MOBIC) 15 MG tablet, , Disp: , Rfl:   •  RESTASIS 0.05 % ophthalmic emulsion, Place 1 Drop in both eyes 2 times a day., Disp: , Rfl:   •  doxepin (SINEQUAN) 25 MG Cap, Take 25 mg by mouth every day., Disp: , Rfl:   •  Cetirizine HCl (ZYRTEC PO), Take 1 Each by mouth every day., Disp: , Rfl:   •  NYAMYC powder, , Disp: , Rfl:   •  Acetaminophen (TYLENOL PO), Take  by mouth. EXTRA STRENGH 2 TABS PRN , Disp: , Rfl:     Allergies   Allergen Reactions   • Ultram [Tramadol Hcl]      ELEVATED LIVER ENZYMES       Past Medical History:    Diagnosis Date   • Acquired polycythemia 2/15/2012   • Anesthesia     PONV   • Arthritis    • Arthropathy 2/15/2012   • Benign essential hypertension 2/15/2012   • Chronic hepatitis B (HCC) 2/15/2012   • Cough 2/15/2012   • Difficulty breathing 2/15/2012   • Esophageal reflux 2/15/2012   • Heart burn    • Hepatitis B    • Hypercholesterolemia 2/15/2012   • Hyperlipidemia 2/15/2012   • Hypertension    • Indigestion    • Jaundice    • Murmur 2/15/2012   • Obesity 2/15/2012   • BACILIO (obstructive sleep apnea) 2/15/2012   • Sinus tachycardia 2/15/2012     Past Surgical History:   Procedure Laterality Date   • SEPTOPLASTY  3/12/2012    Performed by MEHDI SWAIN at SURGERY SAME DAY [facility] ORS   • TURBINOPLASTY  3/12/2012    Performed by MEHDI SWAIN at SURGERY SAME DAY [facility] ORS   • SINUSOTOMIES  3/12/2012    Performed by MEHDI SWAIN at SURGERY SAME DAY [facility] ORS   • TRIGGER FINGER RELEASE  11/23/2009    Performed by MEHDI ONEIL at SURGERY [facility] ORS   • CARPAL TUNNEL RELEASE  2009    b/L   • KNEE ARTHROPLASTY TOTAL  4/8/08    Performed by ROHAN BLACKWELL at SURGERY [facility] ORS   • KNEE ARTHROPLASTY TOTAL  4/8/08    Performed by ROHAN BLACKWELL at SURGERY [facility] ORS   • CATARACT EXTRACTION WITH IOL  2008    OU   • ROTATOR CUFF REPAIR  2005    left   • TRIGGER FINGER RELEASE  2005    left   • CERVICAL CONIZATION      Loop Electrode Excision   • HAND SURGERY     • PRIMARY C SECTION       Family History   Problem Relation Age of Onset   • Other Mother         Family hx of Coronary Arteriosclerosis   • Stroke Mother    • Other Father         Family hx of Coronary Arteriosclerosis   • Heart Attack Father 66     Social History     Socioeconomic History   • Marital status:      Spouse name: Not on file   • Number of children: Not on file   • Years of education: Not on file   • Highest education level: Not on file   Occupational History   • Not on file   Tobacco Use   • Smoking status:  "Never Smoker   • Smokeless tobacco: Never Used   Vaping Use   • Vaping Use: Never used   Substance and Sexual Activity   • Alcohol use: Yes     Alcohol/week: 0.6 oz     Types: 1 Glasses of wine per week     Comment: rarely    • Drug use: No   • Sexual activity: Not Currently   Other Topics Concern   • Not on file   Social History Narrative   • Not on file     Social Determinants of Health     Financial Resource Strain:    • Difficulty of Paying Living Expenses:    Food Insecurity:    • Worried About Running Out of Food in the Last Year:    • Ran Out of Food in the Last Year:    Transportation Needs:    • Lack of Transportation (Medical):    • Lack of Transportation (Non-Medical):    Physical Activity:    • Days of Exercise per Week:    • Minutes of Exercise per Session:    Stress:    • Feeling of Stress :    Social Connections:    • Frequency of Communication with Friends and Family:    • Frequency of Social Gatherings with Friends and Family:    • Attends [employer] Services:    • Active Member of Clubs or Organizations:    • Attends Club or Organization Meetings:    • Marital Status:    Intimate Partner Violence:    • Fear of Current or Ex-Partner:    • Emotionally Abused:    • Physically Abused:    • Sexually Abused:        Objective:     Vitals: /62 (BP Location: Right arm, Patient Position: Sitting, BP Cuff Size: Adult)   Pulse 92   Temp 36.1 °C (97 °F) (Temporal)   Ht 1.676 m (5' 6\")   Wt 94.3 kg (208 lb)   SpO2 93%   BMI 33.57 kg/m²    General: Alert, pleasant, NAD  HEENT: Normocephalic.  Neck supple.   Respiratory: no distress, no audible wheezing, RR -WNL  Skin: Warm, dry, no rashes.  Extremities: No leg edema. No discoloration  Neurological: No tremors  Psych:  Affect/mood is normal, judgement is good, memory is intact, grooming is appropriate.    Assessment/Plan:     Judy was seen today for lab results and anxiety.    Diagnoses and all orders for this visit:    Anxiety  Chronic. Stable. " Using Xanax for this. Advised to avoid daily use.  checked no inconsistencies. Rx sent. Follow up 6 months.  -     ALPRAZolam (XANAX) 0.5 MG Tab; Take 1 Tablet by mouth one time as needed for Anxiety for up to 30 doses.    Postmenopausal  -     DS-BONE DENSITY STUDY (DEXA); Future    Type 2 diabetes mellitus without complication, without long-term current use of insulin (HCC)  Chronic. Metformin recently increased to 1,00mg daily. Continue current regimen. Check feet daily.  Recommend working on dietary modifications. Recheck A1c in 3 months.    Sleep disturbances  Chronic problem. OTC not very helpful. Recommend continued efforts with sleep hygiene habits. No rx prescribed at this time.     Dyslipidemia  On statin therapy. No myalgias. Up to date on lipid panel. Goal LDL.<70.    Chronic respiratory failure requiring use of nocturnal bilevel positive airway pressure (BPAP) by mask (HCC)  Chronic. Now on Bipap. Followed by Pulmonology.     Chronic respiratory failure with hypoxia, on home O2 therapy (HCC)  Chronic. Has been on oxygen via Bipap for more than 6 months now. Reports compliance.  Followed by Pulmonology.     Encounter for screening mammogram for breast cancer  -     MA-SCREENING MAMMO BILAT W/TOMOSYNTHESIS W/CAD; Future    Screen for colon cancer  Has upcoming appt with GI in January for this.       Return in about 6 months (around 3/9/2022).          Jessi SIDHU.

## 2021-09-16 ENCOUNTER — SLEEP CENTER VISIT (OUTPATIENT)
Dept: SLEEP MEDICINE | Facility: MEDICAL CENTER | Age: 76
End: 2021-09-16
Payer: MEDICARE

## 2021-09-16 VITALS
SYSTOLIC BLOOD PRESSURE: 120 MMHG | RESPIRATION RATE: 16 BRPM | DIASTOLIC BLOOD PRESSURE: 62 MMHG | WEIGHT: 211 LBS | HEIGHT: 66 IN | HEART RATE: 109 BPM | BODY MASS INDEX: 33.91 KG/M2 | OXYGEN SATURATION: 91 %

## 2021-09-16 DIAGNOSIS — Z78.9 NONSMOKER: ICD-10-CM

## 2021-09-16 DIAGNOSIS — I10 BENIGN ESSENTIAL HYPERTENSION: Chronic | ICD-10-CM

## 2021-09-16 DIAGNOSIS — G47.33 OSA (OBSTRUCTIVE SLEEP APNEA): ICD-10-CM

## 2021-09-16 DIAGNOSIS — J45.30 MILD PERSISTENT ASTHMA WITHOUT COMPLICATION: ICD-10-CM

## 2021-09-16 PROCEDURE — 99214 OFFICE O/P EST MOD 30 MIN: CPT | Performed by: NURSE PRACTITIONER

## 2021-09-16 ASSESSMENT — FIBROSIS 4 INDEX: FIB4 SCORE: 2.12

## 2021-09-16 NOTE — PROGRESS NOTES
Chief Complaint   Patient presents with   • Follow-Up     Asthma / BACILIO // LAst seen 3/15/2021    • Other     O2 renewal; per letter no new testing needed; document benefit and compliance        HPI:  Judy Bland is a 76 y.o. year old female here today for follow-up on BACILIO and asthma.  Last OV 3/15/21  She is accompanied by her .     PMH: of hypertension, tachycardia, murmur and dyslipidemia.  History of hepatitis B from [place].  She is a retired RN.  Recent treadmill test 2019 was negative for ischemia but deconditioning noted and hypertensive reaction exercise.     Currently using [employer] BIPAP 15/11cm w/2LPM bleed in O2; device obtained 2017.  Recall discussed with patient she did obtain a letter from the .  She has failed to register yet but she will call again.  She denies any acute respiratory symptoms or black debris.  We reviewed the possible use of an inline bacteria filter that she can obtain online and discussed risk/benefit of use.  He would like to continue using her current device.  Compliance 8/17/21-9/15/21 notes 100% compliance, avg nightly use of 8hr 25min, minimal mask leak with reduced AHI 2.6/hr. Reviewed with patient.  She feels she sleeps well on therapy without complaint.  She would like the heat on her tubing turned down.    In regards to asthma, she feels this is clinically stable.  She continues with her Symbicort, Singulair nightly and albuterol HFA inhaler as needed.  She has not had any exacerbations or colds since last office visit.  She notes not being as active due to the extreme heat and air pollution.  She normally uses her albuterol inhaler prior to dog walking which is not occurred on a regular basis.     Sleep hx:  Prior PSG at Dr. Andrade's office 2011 indicated severe sleep apnea with an overall AHI of 38 and a minimum saturation of 67%.   CNOX on Pap 4/19/2019 indicated less than 80% for 593.7 minutes of the night.  Essentially the whole  night.  Titration study was recommended but she has continued to decline. She understands risk of nocturnal hypoxia.  She is currently using APAP 9-15cm  Compliance report 6/9-7/8/2020 compliance, average nightly use 8 hours 20 minutes, mean pressure 10 cm, normal mask leak with reduced AHI of 4.1/h.  I reviewed with patient.  She feels her sleep is poor with difficulty initiating and maintaining it. She wakes 1-2x's per night and urinates. Her  has alzheimers who she cares for causing stress.   CNOX indicated basal SPO2 of 87% patient spent 239 minutes less than 88% ox saturation using auto CPAP 10 to 15 cm.  O2 sara of 85%.  Recommendation was titration study.  Titration study 10/8/2020 indicates 85% sleep efficiency, with improved response on BiPAP 15/11 cm with reduced AHI of 0/h and O2 sara of 82%.  Mean SPO2 85%.  Patient did qualify for bleeding O2.      Pulmonary hx:  Nonsmoker.  PFT 12/18/14 indicated FVC 2.05L or 62%, FEV1 1.71L or 68%, FEV1/FVC ratio 84 and DLCO 120% predicted.    PFT 3/15/2019 indicates normal spirometry with FEV1 2.22 L or 94%, FEV1/FVC ratio 83%, TLC 84% and a DLCO 110% predicted.  PFTs 7/9/2020 indicates FVC 2.18 L or 74%, FEV1 1.87 L or 83%, FEV1/FVC ratio 86%, TLC 78% with a DLCO of 91% predicted.  No significant response.  I reviewed finds with patient.  She did try Breo 1 puff daily but not feel it is any more beneficial than Symbicort.   She is compliant with Symbicort 160/4.5mcg 2 puffs BID, Singulair qhs, zyrtec QD, Flonase prn and Ventolin HFA inhaler prn which she rarely uses.    Remains on Nucala injections; started 2017 with eosinophilia noted on CBC. She notes improved control of respiratory symptoms since starting.  She continues her allergy shot injections.    GERD is controlled on medication.    Last echocardiogram was in 2015 indicating EF 65-70%, RVSP 24-29 mmHg.       ROS: As per HPI and otherwise negative if not stated.    Past Medical History:   Diagnosis  Date   • Acquired polycythemia 2/15/2012   • Anesthesia     PONV   • Arthritis    • Arthropathy 2/15/2012   • Benign essential hypertension 2/15/2012   • Chronic hepatitis B (HCC) 2/15/2012   • Cough 2/15/2012   • Difficulty breathing 2/15/2012   • Esophageal reflux 2/15/2012   • Heart burn    • Hepatitis B    • Hypercholesterolemia 2/15/2012   • Hyperlipidemia 2/15/2012   • Hypertension    • Indigestion    • Jaundice    • Murmur 2/15/2012   • Obesity 2/15/2012   • BACILIO (obstructive sleep apnea) 2/15/2012   • Sinus tachycardia 2/15/2012       Past Surgical History:   Procedure Laterality Date   • SEPTOPLASTY  3/12/2012    Performed by MEHDI SWAIN at SURGERY SAME DAY [facility] ORS   • TURBINOPLASTY  3/12/2012    Performed by MEHDI SWAIN at SURGERY SAME DAY [facility] ORS   • SINUSOTOMIES  3/12/2012    Performed by MEHDI SWAIN at SURGERY SAME DAY [facility] ORS   • TRIGGER FINGER RELEASE  11/23/2009    Performed by MEHDI ONEIL at SURGERY [facility] ORS   • CARPAL TUNNEL RELEASE  2009    b/L   • KNEE ARTHROPLASTY TOTAL  4/8/08    Performed by ROHAN BLACKWELL at SURGERY [facility] ORS   • KNEE ARTHROPLASTY TOTAL  4/8/08    Performed by ROHAN BLACKWELL at SURGERY [facility] ORS   • CATARACT EXTRACTION WITH IOL  2008    OU   • ROTATOR CUFF REPAIR  2005    left   • TRIGGER FINGER RELEASE  2005    left   • CERVICAL CONIZATION      Loop Electrode Excision   • HAND SURGERY     • PRIMARY C SECTION         Family History   Problem Relation Age of Onset   • Other Mother         Family hx of Coronary Arteriosclerosis   • Stroke Mother    • Other Father         Family hx of Coronary Arteriosclerosis   • Heart Attack Father 66       Social History     Socioeconomic History   • Marital status:      Spouse name: Not on file   • Number of children: Not on file   • Years of education: Not on file   • Highest education level: Not on file   Occupational History   • Not on file   Tobacco Use   • Smoking status: Never  "Smoker   • Smokeless tobacco: Never Used   Vaping Use   • Vaping Use: Never used   Substance and Sexual Activity   • Alcohol use: Yes     Alcohol/week: 0.6 oz     Types: 1 Glasses of wine per week     Comment: rarely    • Drug use: No   • Sexual activity: Not Currently   Other Topics Concern   • Not on file   Social History Narrative   • Not on file     Social Determinants of Health     Financial Resource Strain:    • Difficulty of Paying Living Expenses:    Food Insecurity:    • Worried About Running Out of Food in the Last Year:    • Ran Out of Food in the Last Year:    Transportation Needs:    • Lack of Transportation (Medical):    • Lack of Transportation (Non-Medical):    Physical Activity:    • Days of Exercise per Week:    • Minutes of Exercise per Session:    Stress:    • Feeling of Stress :    Social Connections:    • Frequency of Communication with Friends and Family:    • Frequency of Social Gatherings with Friends and Family:    • Attends Protestant Services:    • Active Member of Clubs or Organizations:    • Attends Club or Organization Meetings:    • Marital Status:    Intimate Partner Violence:    • Fear of Current or Ex-Partner:    • Emotionally Abused:    • Physically Abused:    • Sexually Abused:        Allergies as of 09/16/2021 - Reviewed 09/16/2021   Allergen Reaction Noted   • Ultram [tramadol hcl]  02/09/2011        Vitals:  /62 (BP Location: Left arm, Patient Position: Sitting, BP Cuff Size: Adult)   Pulse (!) 109   Resp 16   Ht 1.676 m (5' 6\")   Wt 95.7 kg (211 lb)   SpO2 91%     Current medications as of today   Current Outpatient Medications   Medication Sig Dispense Refill   • ALPRAZolam (XANAX) 0.5 MG Tab Take 1 Tablet by mouth one time as needed for Anxiety for up to 30 doses. 30 Tablet 5   • metFORMIN ER (GLUCOPHAGE XR) 500 MG TABLET SR 24 HR Take 2 Tablets by mouth every day. 180 Tablet 2   • pantoprazole (PROTONIX) 40 MG Tablet Delayed Response Take 1 tablet by mouth every " day. 90 tablet 1   • budesonide-formoterol (SYMBICORT) 160-4.5 MCG/ACT Aerosol USE 2 INHALATIONS ORALLY   TWICE DAILY WITH SPACER,   RINSE MOUTH AFTER EACH USE 3 Each 3   • triamcinolone acetonide (KENALOG) 0.1 % Cream      • albuterol (VENTOLIN HFA) 108 (90 Base) MCG/ACT Aero Soln inhalation aerosol Inhale 2 Puffs every four hours as needed for Shortness of Breath. 1 Each 11   • DILTIAZem (CARDIZEM) 60 MG Tab TAKE 1 TABLET TWICE A  tablet 3   • atorvastatin (LIPITOR) 20 MG Tab TAKE 1 TABLET AT BEDTIME 90 tablet 1   • lisinopril-hydrochlorothiazide (PRINZIDE) 20-12.5 MG per tablet TAKE ONE TABLET BY MOUTH EVERY MORNING 90 tablet 3   • montelukast (SINGULAIR) 10 MG Tab TAKE 1 TABLET DAILY 90 tablet 3   • fluticasone (FLONASE) 50 MCG/ACT nasal spray Administer 1 Spray into affected nostril(S) 2 times a day. Each Nostril 48 g 3   • nystatin (MYCOSTATIN) 737025 UNIT/GM Cream topical cream      • NYAMYC powder      • propranolol (INDERAL) 20 MG Tab TAKE ONE TABLET BY MOUTH EVERY NIGHT AT BEDTIME 90 Tab 3   • meloxicam (MOBIC) 15 MG tablet      • RESTASIS 0.05 % ophthalmic emulsion Place 1 Drop in both eyes 2 times a day.     • doxepin (SINEQUAN) 25 MG Cap Take 25 mg by mouth every day.     • Cetirizine HCl (ZYRTEC PO) Take 1 Each by mouth every day.     • Acetaminophen (TYLENOL PO) Take  by mouth. EXTRA STRENGH 2 TABS PRN        No current facility-administered medications for this visit.         Physical Exam:   Gen:           Alert and oriented, No apparent distress. Mood and affect appropriate, normal interaction with examiner.  Eyes:          PERRL, EOM intact, sclere white, conjunctive moist.  Ears:          Not examined.   Hearing:     Grossly intact.  Nose:          Normal, no lesions or deformities.  Dentition:    Mask.  Oropharynx:   Mask.  Mallampati Classification: mask  Neck:        Supple, trachea midline, no masses.  Respiratory Effort: No intercostal retractions or use of accessory muscles.   Lung  Auscultation:      Clear to auscultation bilaterally; no rales, rhonchi or wheezing.  CV:            Regular rate and rhythm. No murmurs, rubs or gallops.  Abd:           Not examined.   Lymphadenopathy: Not examined.  Gait and Station: Normal.  Digits and Nails: No clubbing, cyanosis, petechiae, or nodes.   Cranial Nerves: II-XII grossly intact.  Skin:        No rashes, lesions or ulcers noted.               Ext:           No cyanosis or edema.      Assessment:  1. BACILIO (obstructive sleep apnea)  DME Mask and Supplies   2. Mild persistent asthma without complication  Spirometry   3. Benign essential hypertension     4. BMI 34.0-34.9,adult  Height And Weight   5. Nonsmoker         Immunizations:    Flu:will obtain later this month  Pneumovax 23:2019  Prevnar 13:2017  COVID-19: 3/6/21, 2/16/21    Plan:  1.  Patient's asthma is clinically stable.  Should continue with current bronchodilators.  Spirometry next office visit  2.  Sleep apnea is well controlled.  Continue BiPAP nightly with 2 L oxygen bleed in.  She will continue to benefit from oxygen therapy.  Turned off heat to function  DME mask/supplies  3.  Discussed sleep and respiratory hygiene.  4.  Encourage weight loss through diet and exercise  5.  For primary care for the health concerns  6.  Follow-up in 6 months to review spirometry and compliance report, sooner if needed.    Please note that this dictation was created using voice recognition software. I have made every reasonable attempt to correct obvious errors, but it is possible there are errors of grammar and possibly content that I did not discover before finalizing the note.

## 2021-09-16 NOTE — PATIENT INSTRUCTIONS
Inline bacteria filter for cpap; may use on tubing with CPAP device to continue use  [place] device with [employer]

## 2021-09-19 PROBLEM — J96.11 CHRONIC RESPIRATORY FAILURE WITH HYPOXIA, ON HOME O2 THERAPY (HCC): Status: ACTIVE | Noted: 2021-09-19

## 2021-09-19 PROBLEM — J96.10: Status: ACTIVE | Noted: 2021-09-19

## 2021-09-19 PROBLEM — Z99.89: Status: ACTIVE | Noted: 2021-09-19

## 2021-09-19 PROBLEM — Z99.81 CHRONIC RESPIRATORY FAILURE WITH HYPOXIA, ON HOME O2 THERAPY (HCC): Status: ACTIVE | Noted: 2021-09-19

## 2021-10-19 ENCOUNTER — HOSPITAL ENCOUNTER (OUTPATIENT)
Dept: RADIOLOGY | Facility: MEDICAL CENTER | Age: 76
End: 2021-10-19
Attending: NURSE PRACTITIONER
Payer: MEDICARE

## 2021-10-19 DIAGNOSIS — Z12.31 ENCOUNTER FOR SCREENING MAMMOGRAM FOR BREAST CANCER: ICD-10-CM

## 2021-10-19 DIAGNOSIS — Z78.0 POSTMENOPAUSAL: ICD-10-CM

## 2021-10-19 PROCEDURE — 77063 BREAST TOMOSYNTHESIS BI: CPT

## 2021-10-19 PROCEDURE — 77080 DXA BONE DENSITY AXIAL: CPT

## 2021-11-09 RX ORDER — PROPRANOLOL HYDROCHLORIDE 20 MG/1
TABLET ORAL
Qty: 90 TABLET | Refills: 3 | Status: SHIPPED | OUTPATIENT
Start: 2021-11-09 | End: 2022-11-10

## 2022-01-06 DIAGNOSIS — R06.02 SHORTNESS OF BREATH: ICD-10-CM

## 2022-01-07 NOTE — TELEPHONE ENCOUNTER
Have we ever prescribed this med? Yes.  If yes, what date? 02/19/21    Last OV: 09/16/21 with Deidra GAGE     Next OV: No Pending appt.     DX: SOB    Medications:   Requested Prescriptions     Pending Prescriptions Disp Refills   • montelukast (SINGULAIR) 10 MG Tab [Pharmacy Med Name: MONTELUKAST  TAB 10MG] 90 Tablet 3     Sig: TAKE 1 TABLET DAILY

## 2022-01-10 RX ORDER — MONTELUKAST SODIUM 10 MG/1
TABLET ORAL
Qty: 90 TABLET | Refills: 1 | Status: SHIPPED | OUTPATIENT
Start: 2022-01-10 | End: 2022-07-11

## 2022-01-12 RX ORDER — ATORVASTATIN CALCIUM 20 MG/1
TABLET, FILM COATED ORAL
Qty: 90 TABLET | Refills: 1 | Status: SHIPPED | OUTPATIENT
Start: 2022-01-12 | End: 2022-07-13

## 2022-03-18 ENCOUNTER — OFFICE VISIT (OUTPATIENT)
Dept: MEDICAL GROUP | Facility: MEDICAL CENTER | Age: 77
End: 2022-03-18
Payer: MEDICARE

## 2022-03-18 VITALS
HEIGHT: 66 IN | BODY MASS INDEX: 33.16 KG/M2 | TEMPERATURE: 98.2 F | HEART RATE: 84 BPM | DIASTOLIC BLOOD PRESSURE: 58 MMHG | SYSTOLIC BLOOD PRESSURE: 112 MMHG | OXYGEN SATURATION: 93 % | WEIGHT: 206.35 LBS

## 2022-03-18 DIAGNOSIS — M65.341 TRIGGER RING FINGER OF RIGHT HAND: ICD-10-CM

## 2022-03-18 DIAGNOSIS — Z99.81 CHRONIC RESPIRATORY FAILURE WITH HYPOXIA, ON HOME O2 THERAPY (HCC): ICD-10-CM

## 2022-03-18 DIAGNOSIS — I10 BENIGN ESSENTIAL HYPERTENSION: ICD-10-CM

## 2022-03-18 DIAGNOSIS — E78.5 DYSLIPIDEMIA: ICD-10-CM

## 2022-03-18 DIAGNOSIS — F41.9 ANXIETY: ICD-10-CM

## 2022-03-18 DIAGNOSIS — J96.11 CHRONIC RESPIRATORY FAILURE WITH HYPOXIA, ON HOME O2 THERAPY (HCC): ICD-10-CM

## 2022-03-18 DIAGNOSIS — E11.9 TYPE 2 DIABETES MELLITUS WITHOUT COMPLICATION, WITHOUT LONG-TERM CURRENT USE OF INSULIN (HCC): ICD-10-CM

## 2022-03-18 LAB
HBA1C MFR BLD: 7.4 % (ref 0–5.6)
INT CON NEG: NEGATIVE
INT CON POS: POSITIVE

## 2022-03-18 PROCEDURE — 83036 HEMOGLOBIN GLYCOSYLATED A1C: CPT | Performed by: NURSE PRACTITIONER

## 2022-03-18 PROCEDURE — 99214 OFFICE O/P EST MOD 30 MIN: CPT | Performed by: NURSE PRACTITIONER

## 2022-03-18 RX ORDER — ALPRAZOLAM 0.5 MG/1
TABLET ORAL
COMMUNITY
Start: 2022-02-22 | End: 2022-09-23

## 2022-03-18 RX ORDER — ALPRAZOLAM 0.5 MG/1
0.5 TABLET ORAL
Qty: 30 TABLET | Refills: 5 | Status: SHIPPED | OUTPATIENT
Start: 2022-03-18 | End: 2022-09-23 | Stop reason: SDUPTHER

## 2022-03-18 ASSESSMENT — FIBROSIS 4 INDEX: FIB4 SCORE: 2.12

## 2022-03-18 ASSESSMENT — PATIENT HEALTH QUESTIONNAIRE - PHQ9: CLINICAL INTERPRETATION OF PHQ2 SCORE: 0

## 2022-03-18 NOTE — PROGRESS NOTES
Chief Complaint   Patient presents with   • Diabetes Follow-up       Subjective:     HPI:     Judy Bland is a 76 y.o. female   here to discuss the evaluation and management of:     Trigger finger  About two months ago having trigger finger on her right ring finger. Has had both of her thumbs with surgery for this.      Anxiety  Chronic. Taking Xanax for this. Needs refills.  reviewed. Following up every 6 months for this. 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        Type 2 diabetes mellitus without complication, without long-term current use of insulin ([facility])  Recent A1c is 7.5%. Metformin was recently increased to 1,000 mg daily. No GI side effects.   Tries to walk her dog daily.  Has been indulging.     Benign essential hypertension  Controlled on lisinopril/hctz and diltiazem.  Last GFR >60.      Dyslipidemia  On statin therapy. No myalgias.       BACILIO-chronic use of oxygen therapy  She is using Bipap with 2 L of oxygen bled in nightly. She is compliant with this. She follows closely with Pulmonology    Tells me she will have a colonoscopy in June.    She also tells me that she hit her head about 4 months ago.  No loss of consciousness.  Just wanted to make mention of this.  No residual.    ROS:  Denies any Headache, Blurred Vision, Confusion, Chest pain,  Shortness of breath,  Abdominal pain, Changes of bowel or bladder, Lower ext edema, Fevers, Nights sweats, Weight Changes, Focal weakness or numbness.  And all other systems reviewed and are all negative. POSITIVE FOR : see above        Current Outpatient Medications:   •  ALPRAZolam (XANAX) 0.5 MG Tab, Take 1 Tablet by mouth one time as needed for Anxiety for up to 30 doses., Disp: 30 Tablet, Rfl: 5  •  pantoprazole (PROTONIX) 40 MG Tablet Delayed Response, TAKE 1 TABLET DAILY, Disp: 90 Tablet, Rfl: 3  •  ALPRAZolam (XANAX) 0.5 MG Tab, , Disp: , Rfl:   •   atorvastatin (LIPITOR) 20 MG Tab, TAKE 1 TABLET AT BEDTIME, Disp: 90 Tablet, Rfl: 1  •  montelukast (SINGULAIR) 10 MG Tab, TAKE 1 TABLET DAILY, Disp: 90 Tablet, Rfl: 1  •  propranolol (INDERAL) 20 MG Tab, TAKE ONE TABLET BY MOUTH EVERY NIGHT AT BEDTIME, Disp: 90 Tablet, Rfl: 3  •  metFORMIN ER (GLUCOPHAGE XR) 500 MG TABLET SR 24 HR, Take 2 Tablets by mouth every day., Disp: 180 Tablet, Rfl: 2  •  budesonide-formoterol (SYMBICORT) 160-4.5 MCG/ACT Aerosol, USE 2 INHALATIONS ORALLY   TWICE DAILY WITH SPACER,   RINSE MOUTH AFTER EACH USE, Disp: 3 Each, Rfl: 3  •  triamcinolone acetonide (KENALOG) 0.1 % Cream, , Disp: , Rfl:   •  albuterol (VENTOLIN HFA) 108 (90 Base) MCG/ACT Aero Soln inhalation aerosol, Inhale 2 Puffs every four hours as needed for Shortness of Breath., Disp: 1 Each, Rfl: 11  •  DILTIAZem (CARDIZEM) 60 MG Tab, TAKE 1 TABLET TWICE A DAY, Disp: 180 tablet, Rfl: 3  •  lisinopril-hydrochlorothiazide (PRINZIDE) 20-12.5 MG per tablet, TAKE ONE TABLET BY MOUTH EVERY MORNING, Disp: 90 tablet, Rfl: 3  •  fluticasone (FLONASE) 50 MCG/ACT nasal spray, Administer 1 Spray into affected nostril(S) 2 times a day. Each Nostril, Disp: 48 g, Rfl: 3  •  nystatin (MYCOSTATIN) 468819 UNIT/GM Cream topical cream, , Disp: , Rfl:   •  NYAMYC powder, , Disp: , Rfl:   •  meloxicam (MOBIC) 15 MG tablet, , Disp: , Rfl:   •  RESTASIS 0.05 % ophthalmic emulsion, Place 1 Drop in both eyes 2 times a day., Disp: , Rfl:   •  doxepin (SINEQUAN) 25 MG Cap, Take 25 mg by mouth every day., Disp: , Rfl:   •  Cetirizine HCl (ZYRTEC PO), Take 1 Each by mouth every day., Disp: , Rfl:   •  Acetaminophen (TYLENOL PO), Take  by mouth. EXTRA STRENGH 2 TABS PRN , Disp: , Rfl:     Allergies   Allergen Reactions   • Ultram [Tramadol Hcl]      ELEVATED LIVER ENZYMES       Past Medical History:   Diagnosis Date   • Acquired polycythemia 2/15/2012   • Anesthesia     PONV   • Arthritis    • Arthropathy 2/15/2012   • Benign essential hypertension  2/15/2012   • Chronic hepatitis B (HCC) 2/15/2012   • Cough 2/15/2012   • Difficulty breathing 2/15/2012   • Esophageal reflux 2/15/2012   • Heart burn    • Hepatitis B    • Hypercholesterolemia 2/15/2012   • Hyperlipidemia 2/15/2012   • Hypertension    • Indigestion    • Jaundice    • Murmur 2/15/2012   • Obesity 2/15/2012   • BACILIO (obstructive sleep apnea) 2/15/2012   • Sinus tachycardia 2/15/2012     Past Surgical History:   Procedure Laterality Date   • SEPTOPLASTY  3/12/2012    Performed by MEHDI SWAIN at SURGERY SAME DAY [facility] ORS   • TURBINOPLASTY  3/12/2012    Performed by MEHDI SWAIN at SURGERY SAME DAY [facility] ORS   • SINUSOTOMIES  3/12/2012    Performed by MEHDI SWAIN at SURGERY SAME DAY [facility] ORS   • TRIGGER FINGER RELEASE  11/23/2009    Performed by MEHDI ONEIL at SURGERY [facility] ORS   • CARPAL TUNNEL RELEASE  2009    b/L   • KNEE ARTHROPLASTY TOTAL  4/8/08    Performed by ROHAN BLACKWELL at SURGERY [facility] ORS   • KNEE ARTHROPLASTY TOTAL  4/8/08    Performed by ROHAN BLACKWELL at SURGERY [facility] ORS   • CATARACT EXTRACTION WITH IOL  2008    OU   • ROTATOR CUFF REPAIR  2005    left   • TRIGGER FINGER RELEASE  2005    left   • CERVICAL CONIZATION      Loop Electrode Excision   • HAND SURGERY     • PRIMARY C SECTION       Family History   Problem Relation Age of Onset   • Other Mother         Family hx of Coronary Arteriosclerosis   • Stroke Mother    • Other Father         Family hx of Coronary Arteriosclerosis   • Heart Attack Father 66     Social History     Socioeconomic History   • Marital status:      Spouse name: Not on file   • Number of children: Not on file   • Years of education: Not on file   • Highest education level: Not on file   Occupational History   • Not on file   Tobacco Use   • Smoking status: Never Smoker   • Smokeless tobacco: Never Used   Vaping Use   • Vaping Use: Never used   Substance and Sexual Activity   • Alcohol use: Yes      "Alcohol/week: 0.6 oz     Types: 1 Glasses of wine per week     Comment: rarely    • Drug use: No   • Sexual activity: Not Currently   Other Topics Concern   • Not on file   Social History Narrative   • Not on file     Social Determinants of Health     Financial Resource Strain: Not on file   Food Insecurity: Not on file   Transportation Needs: Not on file   Physical Activity: Not on file   Stress: Not on file   Social Connections: Not on file   Intimate Partner Violence: Not on file   Housing Stability: Not on file       Objective:     Vitals: /58 (BP Location: Right arm, Patient Position: Sitting, BP Cuff Size: Adult)   Pulse 84   Temp 36.8 °C (98.2 °F) (Temporal)   Ht 1.676 m (5' 6\")   Wt 93.6 kg (206 lb 5.6 oz)   SpO2 93%   BMI 33.31 kg/m²    General: Alert, pleasant, NAD  HEENT: Normocephalic.  Neck supple.   Respiratory: no distress, no audible wheezing, RR -WNL  Skin: Warm, dry, no rashes.  Extremities: No leg edema. No discoloration  Neurological: No tremors  Psych:  Affect/mood is normal, judgement is good, memory is intact, grooming is appropriate.    Assessment/Plan:     Judy was seen today for diabetes follow-up.    Diagnoses and all orders for this visit:    Anxiety   chronic.  Xanax is helpful for this.   checked.  No inconsistencies and she does try to use this very sparingly.  She does care for her  who does have Alzheimer's which is progressing.  Refills provided.  Follow-up 6 months for additional refills.  -     ALPRAZolam (XANAX) 0.5 MG Tab; Take 1 Tablet by mouth one time as needed for Anxiety for up to 30 doses.    Type 2 diabetes mellitus without complication, without long-term current use of insulin (HCC)  Chronic, A1c did increase slightly.  Continue Metformin 1000 mg daily.  Monofilament completed with intact sensation.  Recommend increasing her activity, reducing carbohydrates.  Follow-up labs next visit.  -     POCT Hemoglobin A1C  -     MICROALBUMIN CREAT RATIO " URINE; Future  -     Comp Metabolic Panel; Future  -     HEMOGLOBIN A1C; Future  -     TSH WITH REFLEX TO FT4; Future  -     Diabetic Monofilament Lower Extremity Exam    Dyslipidemia  On statin therapy.  No myalgias.  Continue current regimen.  Due for labs prior to next visit.  -     Lipid Profile; Future  -     TSH WITH REFLEX TO FT4; Future    Benign essential hypertension  Stable. Will continue medication regimen. Discussed heart healthy diet. Encouraged to avoid high sodium foods.  Labs prior to next visit.    Trigger ring finger of right hand  Stable.  She will follow-up if she would like a referral to orthopedics.    Chronic respiratory failure with hypoxia, on home O2 therapy (HCC)  Chronic.  Compliant with her oxygen via BiPAP.  Continue to follow-up with pulmonology for this.      Return in about 6 months (around 9/18/2022) for Lab results, Diabetes.          Jessi SIDHU.

## 2022-03-18 NOTE — LETTER
[facility]  NAHUM Ho.  [address]  [place] [address]  Fax: [phone]   Authorization for Release/Disclosure of   Protected Health Information   Name: ROSHAN WORTHINGTON : 1945 SSN: [id number]   Address: [address] [place] [id number] Phone:    [phone] (home) [phone] (work)   I authorize the entity listed below to release/disclose the PHI below to:   [facility]/MARKOS Ho and MARKOS Ho   Provider or Entity Name:  [facility]   Address   City, State, Zip   Phone:      Fax:     Reason for request: continuity of care   Information to be released:    [  ] LAST COLONOSCOPY,  including any PATH REPORT and follow-up  [  ] LAST FIT/COLOGUARD RESULT [  ] LAST DEXA  [  ] LAST MAMMOGRAM  [  ] LAST PAP  [  ] LAST LABS [x  ] RETINA EXAM REPORT  [  ] IMMUNIZATION RECORDS  [  ] Release all info      [  ] Check here and initial the line next to each item to release ALL health information INCLUDING  _____ Care and treatment for drug and / or alcohol abuse  _____ HIV testing, infection status, or AIDS  _____ Genetic Testing    DATES OF SERVICE OR TIME PERIOD TO BE DISCLOSED: _____________  I understand and acknowledge that:  * This Authorization may be revoked at any time by you in writing, except if your health information has already been used or disclosed.  * Your health information that will be used or disclosed as a result of you signing this authorization could be re-disclosed by the recipient. If this occurs, your re-disclosed health information may no longer be protected by State or Federal laws.  * You may refuse to sign this Authorization. Your refusal will not affect your ability to obtain treatment.  * This Authorization becomes effective upon signing and will  on (date) __________.      If no date is indicated, this Authorization will  one (1) year from the signature date.     Name: Judy Calvillo Baldo    Signature:   Date:     3/18/2022       PLEASE FAX REQUESTED RECORDS BACK TO: [phone]

## 2022-03-22 RX ORDER — PANTOPRAZOLE SODIUM 40 MG/1
TABLET, DELAYED RELEASE ORAL
Qty: 90 TABLET | Refills: 3 | Status: SHIPPED | OUTPATIENT
Start: 2022-03-22 | End: 2023-01-03

## 2022-04-26 ENCOUNTER — NON-PROVIDER VISIT (OUTPATIENT)
Dept: SLEEP MEDICINE | Facility: MEDICAL CENTER | Age: 77
End: 2022-04-26
Payer: MEDICARE

## 2022-04-26 VITALS — BODY MASS INDEX: 32.95 KG/M2 | WEIGHT: 205 LBS | HEIGHT: 66 IN

## 2022-04-26 DIAGNOSIS — J45.30 MILD PERSISTENT ASTHMA WITHOUT COMPLICATION: ICD-10-CM

## 2022-04-26 PROCEDURE — 94010 BREATHING CAPACITY TEST: CPT | Performed by: STUDENT IN AN ORGANIZED HEALTH CARE EDUCATION/TRAINING PROGRAM

## 2022-04-26 ASSESSMENT — FIBROSIS 4 INDEX: FIB4 SCORE: 2.12

## 2022-04-26 ASSESSMENT — PULMONARY FUNCTION TESTS
FEV1/FVC_PERCENT_PREDICTED: 107
FEV1/FVC: 82
FVC_PERCENT_PREDICTED: 87
FEV1/FVC_PREDICTED: 76.33
FEV1: 2.03
FEV1_PREDICTED: 93
FEV1_PREDICTED: 2.16
FVC: 2.49
FVC_PREDICTED: 2.83

## 2022-04-26 NOTE — PROCEDURES
Technician: Nina Moncada [facility]  Tech notes:  Good pt effort and cooperation. ATS standards met.  Pt did use albuterol HFA 3 hrs prior to test- no post done.    Interpretation:    Spirometry only: No significant obstruction. No significant restriction per FVC         Sophia Raymond MD  Pulmonary and Critical Care Medicine  [facility]

## 2022-05-05 ENCOUNTER — OFFICE VISIT (OUTPATIENT)
Dept: SLEEP MEDICINE | Facility: MEDICAL CENTER | Age: 77
End: 2022-05-05
Payer: MEDICARE

## 2022-05-05 VITALS
BODY MASS INDEX: 32.14 KG/M2 | HEIGHT: 66 IN | WEIGHT: 200 LBS | DIASTOLIC BLOOD PRESSURE: 60 MMHG | SYSTOLIC BLOOD PRESSURE: 108 MMHG | OXYGEN SATURATION: 90 % | RESPIRATION RATE: 16 BRPM | HEART RATE: 87 BPM

## 2022-05-05 DIAGNOSIS — I10 BENIGN ESSENTIAL HYPERTENSION: Chronic | ICD-10-CM

## 2022-05-05 DIAGNOSIS — J30.9 ALLERGIC RHINITIS, UNSPECIFIED SEASONALITY, UNSPECIFIED TRIGGER: ICD-10-CM

## 2022-05-05 DIAGNOSIS — J45.30 MILD PERSISTENT ASTHMA WITHOUT COMPLICATION: Chronic | ICD-10-CM

## 2022-05-05 DIAGNOSIS — Z78.9 NONSMOKER: ICD-10-CM

## 2022-05-05 DIAGNOSIS — G47.33 OSA (OBSTRUCTIVE SLEEP APNEA): Chronic | ICD-10-CM

## 2022-05-05 DIAGNOSIS — K21.9 GASTROESOPHAGEAL REFLUX DISEASE, UNSPECIFIED WHETHER ESOPHAGITIS PRESENT: Chronic | ICD-10-CM

## 2022-05-05 PROCEDURE — 99214 OFFICE O/P EST MOD 30 MIN: CPT | Performed by: NURSE PRACTITIONER

## 2022-05-05 ASSESSMENT — FIBROSIS 4 INDEX: FIB4 SCORE: 2.12

## 2022-05-05 NOTE — PROGRESS NOTES
Chief Complaint   Patient presents with   • Follow-Up     last seen 9/6/2021    • Results     Juan Carlos 4/26/2022        HPI:  Judy Bladn is a 76 y.o. year old female here today for follow-up on asthma and BACILIO.  Last OV 9/16/21    Accompanied by her .    Spirometry updated 4/26/2022 noting normal results with FVC 2.49 L or 87%, FEV1 2.03 L or 93%, FEV1/FVC ratio 82.  Postbronchodilator testing not performed.  Findings not improved airflows compared to prior testing.  Reviewed with patient.  She notes her breathing to be stable.  She recently got a new dog who is 3 years old days ago and her treatment is walking her daily and is lost about 10 pounds.  She has shortness of breath only when walking her dog and does use albuterol HFA inhaler 2 puffs prior to this.  She denies cough, phlegm, chest pain, chest tightness or wheezing.  She does continues Flonase as needed for allergies.    Currently using [employer] BIPAP 15/11cm w/2LPM bleed in O2; device obtained 2017.  She register her device for replacement due to recall.  Compliance at last office visit noted good response to therapy with a reduced AHI of 2.6/h.  She denies any changes in her sleep.  Compliance report 4/5/2023 from 5/4/2022 indicates 100% compliance, average nightly is 9 hours, no significant mask with a reduced AHI of 2.8/h.  Reviewed with patient.      Sleep hx:  Prior PSG at Dr. Andrade's office 2011 indicated severe sleep apnea with an overall AHI of 38 and a minimum saturation of 67%.   CNOX on Pap 4/19/2019 indicated less than 80% for 593.7 minutes of the night.  Essentially the whole night.  Titration study was recommended but she has continued to decline. She understands risk of nocturnal hypoxia.  She is currently using APAP 9-15cm  Compliance report 6/9-7/8/2020 compliance, average nightly use 8 hours 20 minutes, mean pressure 10 cm, normal mask leak with reduced AHI of 4.1/h.  I reviewed with patient.  She feels her sleep is  poor with difficulty initiating and maintaining it. She wakes 1-2x's per night and urinates. Her  has alzheimers who she cares for causing stress.   CNOX indicated basal SPO2 of 87% patient spent 239 minutes less than 88% ox saturation using auto CPAP 10 to 15 cm.  O2 sara of 85%.  Recommendation was titration study.  Titration study 10/8/2020 indicates 85% sleep efficiency, with improved response on BiPAP 15/11 cm with reduced AHI of 0/h and O2 sara of 82%.  Mean SPO2 85%.  Patient did qualify for bleeding O2.      Pulmonary hx:  Nonsmoker.  PFT 12/18/14 indicated FVC 2.05L or 62%, FEV1 1.71L or 68%, FEV1/FVC ratio 84 and DLCO 120% predicted.    PFT 3/15/2019 indicates normal spirometry with FEV1 2.22 L or 94%, FEV1/FVC ratio 83%, TLC 84% and a DLCO 110% predicted.  PFTs 7/9/2020 indicates FVC 2.18 L or 74%, FEV1 1.87 L or 83%, FEV1/FVC ratio 86%, TLC 78% with a DLCO of 91% predicted.  No significant response.  I reviewed finds with patient.  She did try Breo 1 puff daily but not feel it is any more beneficial than Symbicort.   She is compliant with Symbicort 160/4.5mcg 2 puffs BID, Singulair qhs, zyrtec QD, Flonase prn and Ventolin HFA inhaler prn which she rarely uses.    Remains on Nucala injections; started 2017 with eosinophilia noted on CBC. She notes improved control of respiratory symptoms since starting.  She continues her allergy shot injections.    GERD is controlled on medication.    Last echocardiogram was in 2015 indicating EF 65-70%, RVSP 24-29 mmHg.    ROS: As per HPI and otherwise negative if not stated.    Past Medical History:   Diagnosis Date   • Acquired polycythemia 2/15/2012   • Anesthesia     PONV   • Arthritis    • Arthropathy 2/15/2012   • Benign essential hypertension 2/15/2012   • Chronic hepatitis B (HCC) 2/15/2012   • Cough 2/15/2012   • Difficulty breathing 2/15/2012   • Esophageal reflux 2/15/2012   • Heart burn    • Hepatitis B    • Hypercholesterolemia 2/15/2012   •  Hyperlipidemia 2/15/2012   • Hypertension    • Indigestion    • Jaundice    • Murmur 2/15/2012   • Obesity 2/15/2012   • BACILIO (obstructive sleep apnea) 2/15/2012   • Sinus tachycardia 2/15/2012       Past Surgical History:   Procedure Laterality Date   • SEPTOPLASTY  3/12/2012    Performed by MEHDI SWAIN at SURGERY SAME DAY [facility] ORS   • TURBINOPLASTY  3/12/2012    Performed by MEHDI SWAIN at SURGERY SAME DAY [facility] ORS   • SINUSOTOMIES  3/12/2012    Performed by MEHDI SWAIN at SURGERY SAME DAY [facility] ORS   • TRIGGER FINGER RELEASE  11/23/2009    Performed by MEHDI ONEIL at SURGERY [facility] ORS   • CARPAL TUNNEL RELEASE  2009    b/L   • KNEE ARTHROPLASTY TOTAL  4/8/08    Performed by ROHAN BLACKWELL at SURGERY [facility] ORS   • KNEE ARTHROPLASTY TOTAL  4/8/08    Performed by ROHAN BLACKWELL at SURGERY [facility] ORS   • CATARACT EXTRACTION WITH IOL  2008    OU   • ROTATOR CUFF REPAIR  2005    left   • TRIGGER FINGER RELEASE  2005    left   • CERVICAL CONIZATION      Loop Electrode Excision   • HAND SURGERY     • PRIMARY C SECTION         Family History   Problem Relation Age of Onset   • Other Mother         Family hx of Coronary Arteriosclerosis   • Stroke Mother    • Other Father         Family hx of Coronary Arteriosclerosis   • Heart Attack Father 66       Social History     Socioeconomic History   • Marital status:      Spouse name: Not on file   • Number of children: Not on file   • Years of education: Not on file   • Highest education level: Not on file   Occupational History   • Not on file   Tobacco Use   • Smoking status: Never Smoker   • Smokeless tobacco: Never Used   Vaping Use   • Vaping Use: Never used   Substance and Sexual Activity   • Alcohol use: Yes     Alcohol/week: 0.6 oz     Types: 1 Glasses of wine per week     Comment: rarely    • Drug use: No   • Sexual activity: Not Currently   Other Topics Concern   • Not on file   Social History Narrative   • Not on file  "    Social Determinants of Health     Financial Resource Strain: Not on file   Food Insecurity: Not on file   Transportation Needs: Not on file   Physical Activity: Not on file   Stress: Not on file   Social Connections: Not on file   Intimate Partner Violence: Not on file   Housing Stability: Not on file       Allergies as of 05/05/2022 - Reviewed 05/05/2022   Allergen Reaction Noted   • Ultram [tramadol hcl]  02/09/2011        Vitals:  /60 (BP Location: Left arm, Patient Position: Sitting, BP Cuff Size: Adult)   Pulse 87   Resp 16   Ht 1.676 m (5' 6\")   Wt 90.7 kg (200 lb)   SpO2 90%     Current medications as of today   Current Outpatient Medications   Medication Sig Dispense Refill   • pantoprazole (PROTONIX) 40 MG Tablet Delayed Response TAKE 1 TABLET DAILY 90 Tablet 3   • ALPRAZolam (XANAX) 0.5 MG Tab      • atorvastatin (LIPITOR) 20 MG Tab TAKE 1 TABLET AT BEDTIME 90 Tablet 1   • montelukast (SINGULAIR) 10 MG Tab TAKE 1 TABLET DAILY 90 Tablet 1   • propranolol (INDERAL) 20 MG Tab TAKE ONE TABLET BY MOUTH EVERY NIGHT AT BEDTIME 90 Tablet 3   • metFORMIN ER (GLUCOPHAGE XR) 500 MG TABLET SR 24 HR Take 2 Tablets by mouth every day. 180 Tablet 2   • budesonide-formoterol (SYMBICORT) 160-4.5 MCG/ACT Aerosol USE 2 INHALATIONS ORALLY   TWICE DAILY WITH SPACER,   RINSE MOUTH AFTER EACH USE 3 Each 3   • triamcinolone acetonide (KENALOG) 0.1 % Cream      • albuterol (VENTOLIN HFA) 108 (90 Base) MCG/ACT Aero Soln inhalation aerosol Inhale 2 Puffs every four hours as needed for Shortness of Breath. 1 Each 11   • DILTIAZem (CARDIZEM) 60 MG Tab TAKE 1 TABLET TWICE A  tablet 3   • lisinopril-hydrochlorothiazide (PRINZIDE) 20-12.5 MG per tablet TAKE ONE TABLET BY MOUTH EVERY MORNING 90 tablet 3   • fluticasone (FLONASE) 50 MCG/ACT nasal spray Administer 1 Spray into affected nostril(S) 2 times a day. Each Nostril 48 g 3   • nystatin (MYCOSTATIN) 050519 UNIT/GM Cream topical cream      • NYAMYC powder      • " meloxicam (MOBIC) 15 MG tablet      • RESTASIS 0.05 % ophthalmic emulsion Place 1 Drop in both eyes 2 times a day.     • doxepin (SINEQUAN) 25 MG Cap Take 25 mg by mouth every day.     • Cetirizine HCl (ZYRTEC PO) Take 1 Each by mouth every day.     • Acetaminophen (TYLENOL PO) Take  by mouth. EXTRA STRENGH 2 TABS PRN       No current facility-administered medications for this visit.         Physical Exam:   Gen:           Alert and oriented, No apparent distress. Mood and affect appropriate, normal interaction with examiner.  Eyes:          PERRL, EOM intact, sclere white, conjunctive moist.  Ears:          Not examined.  Hearing:     Grossly intact.  Nose:          Normal, no lesions or deformities.  Dentition:    Mask.  Oropharynx:   Mask.  Mallampati Classification: mask  Neck:        Supple, trachea midline, no masses.  Respiratory Effort: No intercostal retractions or use of accessory muscles.   Lung Auscultation:      Clear to auscultation bilaterally; no rales, rhonchi or wheezing.  CV:            Regular rate and rhythm. No murmurs, rubs or gallops.  Abd:           Not examined.   Lymphadenopathy: Not examined.  Gait and Station: Normal.  Digits and Nails: No clubbing, cyanosis, petechiae, or nodes.   Cranial Nerves: II-XII grossly intact.  Skin:        No rashes, lesions or ulcers noted.               Ext:           No cyanosis or edema.      Assessment:  1. Mild persistent asthma without complication     2. BACILIO (obstructive sleep apnea)     3. Allergic rhinitis, unspecified seasonality, unspecified trigger     4. Gastroesophageal reflux disease, unspecified whether esophagitis present     5. BMI 32.0-32.9,adult  Height And Weight   6. Benign essential hypertension     7. Nonsmoker         Immunizations:    Flu:11/3/21  Pneumovax 23:2019  Prevnar 13:2017  COVID-19: 10/1/21, 3/6/21, 2/16/21    Plan:  1.  Patient's asthma is clinically stable.  She has become more conditioned due to regular walking with the  dog now.  She will continue her current bronchodilators.  2.  BACILIO is well controlled.  She will continue BiPAP at 2 L oxygen bleed in.  Recall discussed and she will be contacting the company again to see if her device needs to be replaced.  She denies any symptoms associated with the recall.  3.  Continue daily PPI for GERD.  4.  Continue Flonase for allergy control  5.  Follow primary care for the health concerns including management hypertension.  6.  Continue weight loss efforts through regular walking of the dog and dietary changes  7.  Follow-up in 1 year to review asthma and compliance report, sooner if needed.    Please note that this dictation was created using voice recognition software. I have made every reasonable attempt to correct obvious errors, but it is possible there are errors of grammar and possibly content that I did not discover before finalizing the note.

## 2022-05-17 RX ORDER — METFORMIN HYDROCHLORIDE 500 MG/1
1500 TABLET, EXTENDED RELEASE ORAL DAILY
Qty: 270 TABLET | Refills: 3 | Status: SHIPPED | OUTPATIENT
Start: 2022-05-17 | End: 2022-05-20

## 2022-05-20 RX ORDER — METFORMIN HYDROCHLORIDE 500 MG/1
TABLET, EXTENDED RELEASE ORAL
Qty: 180 TABLET | Refills: 3 | Status: SHIPPED | OUTPATIENT
Start: 2022-05-20 | End: 2023-05-18

## 2022-06-09 ENCOUNTER — APPOINTMENT (RX ONLY)
Dept: URBAN - METROPOLITAN AREA CLINIC 22 | Facility: CLINIC | Age: 77
Setting detail: DERMATOLOGY
End: 2022-06-09

## 2022-06-09 DIAGNOSIS — D22 MELANOCYTIC NEVI: ICD-10-CM

## 2022-06-09 DIAGNOSIS — L81.4 OTHER MELANIN HYPERPIGMENTATION: ICD-10-CM

## 2022-06-09 DIAGNOSIS — L30.4 ERYTHEMA INTERTRIGO: ICD-10-CM | Status: INADEQUATELY CONTROLLED

## 2022-06-09 DIAGNOSIS — Z71.89 OTHER SPECIFIED COUNSELING: ICD-10-CM

## 2022-06-09 DIAGNOSIS — L82.0 INFLAMED SEBORRHEIC KERATOSIS: ICD-10-CM

## 2022-06-09 DIAGNOSIS — L57.0 ACTINIC KERATOSIS: ICD-10-CM

## 2022-06-09 DIAGNOSIS — D18.0 HEMANGIOMA: ICD-10-CM

## 2022-06-09 DIAGNOSIS — L82.1 OTHER SEBORRHEIC KERATOSIS: ICD-10-CM

## 2022-06-09 PROBLEM — D48.5 NEOPLASM OF UNCERTAIN BEHAVIOR OF SKIN: Status: ACTIVE | Noted: 2022-06-09

## 2022-06-09 PROBLEM — D22.5 MELANOCYTIC NEVI OF TRUNK: Status: ACTIVE | Noted: 2022-06-09

## 2022-06-09 PROBLEM — D18.01 HEMANGIOMA OF SKIN AND SUBCUTANEOUS TISSUE: Status: ACTIVE | Noted: 2022-06-09

## 2022-06-09 PROCEDURE — ? PRESCRIPTION

## 2022-06-09 PROCEDURE — ? SUNSCREEN RECOMMENDATIONS

## 2022-06-09 PROCEDURE — ? BIOPSY BY SHAVE METHOD

## 2022-06-09 PROCEDURE — 11102 TANGNTL BX SKIN SINGLE LES: CPT | Mod: 59

## 2022-06-09 PROCEDURE — ? COUNSELING

## 2022-06-09 PROCEDURE — 99214 OFFICE O/P EST MOD 30 MIN: CPT | Mod: 25

## 2022-06-09 PROCEDURE — ? LIQUID NITROGEN

## 2022-06-09 PROCEDURE — 17000 DESTRUCT PREMALG LESION: CPT | Mod: 59

## 2022-06-09 PROCEDURE — ? ADDITIONAL NOTES

## 2022-06-09 PROCEDURE — 17111 DESTRUCTION B9 LESIONS 15/>: CPT

## 2022-06-09 PROCEDURE — 17003 DESTRUCT PREMALG LES 2-14: CPT | Mod: 59

## 2022-06-09 RX ORDER — NYSTATIN 100000 [USP'U]/G
CREAM TOPICAL BID
Qty: 30 | Refills: 2 | Status: ERX | COMMUNITY
Start: 2022-06-09

## 2022-06-09 RX ADMIN — NYSTATIN: 100000 CREAM TOPICAL at 00:00

## 2022-06-09 ASSESSMENT — LOCATION SIMPLE DESCRIPTION DERM
LOCATION SIMPLE: RIGHT HAND
LOCATION SIMPLE: POSTERIOR NECK
LOCATION SIMPLE: ABDOMEN
LOCATION SIMPLE: RIGHT SHOULDER
LOCATION SIMPLE: RIGHT CLAVICULAR SKIN
LOCATION SIMPLE: NECK
LOCATION SIMPLE: LEFT BREAST
LOCATION SIMPLE: RIGHT FOREARM
LOCATION SIMPLE: CHEST
LOCATION SIMPLE: RIGHT UPPER BACK
LOCATION SIMPLE: RIGHT ANTERIOR NECK
LOCATION SIMPLE: RIGHT CHEEK
LOCATION SIMPLE: RIGHT BREAST
LOCATION SIMPLE: GROIN
LOCATION SIMPLE: RIGHT LOWER BACK
LOCATION SIMPLE: RIGHT PRETIBIAL REGION
LOCATION SIMPLE: LEFT HAND
LOCATION SIMPLE: LEFT ANTERIOR NECK
LOCATION SIMPLE: LEFT UPPER BACK

## 2022-06-09 ASSESSMENT — LOCATION DETAILED DESCRIPTION DERM
LOCATION DETAILED: LEFT MID-UPPER BACK
LOCATION DETAILED: LEFT SUPERIOR UPPER BACK
LOCATION DETAILED: LEFT THENAR EMINENCE
LOCATION DETAILED: RIGHT ANTERIOR SHOULDER
LOCATION DETAILED: EPIGASTRIC SKIN
LOCATION DETAILED: RIGHT THENAR EMINENCE
LOCATION DETAILED: RIGHT LATERAL BUCCAL CHEEK
LOCATION DETAILED: RIGHT VENTRAL PROXIMAL FOREARM
LOCATION DETAILED: RIGHT CLAVICULAR SKIN
LOCATION DETAILED: LEFT SUPRAPUBIC SKIN
LOCATION DETAILED: RIGHT INFERIOR LATERAL NECK
LOCATION DETAILED: LEFT CENTRAL LATERAL NECK
LOCATION DETAILED: RIGHT CLAVICULAR NECK
LOCATION DETAILED: LEFT INFRAMAMMARY CREASE (INNER QUADRANT)
LOCATION DETAILED: RIGHT SUPERIOR MEDIAL MIDBACK
LOCATION DETAILED: RIGHT INFRAMAMMARY CREASE (INNER QUADRANT)
LOCATION DETAILED: RIGHT PROXIMAL PRETIBIAL REGION
LOCATION DETAILED: UPPER STERNUM
LOCATION DETAILED: LEFT CENTRAL POSTERIOR NECK
LOCATION DETAILED: LEFT CLAVICULAR NECK
LOCATION DETAILED: RIGHT PROXIMAL DORSAL FOREARM
LOCATION DETAILED: LEFT SUPERIOR MEDIAL UPPER BACK
LOCATION DETAILED: RIGHT SUPERIOR UPPER BACK
LOCATION DETAILED: LEFT MEDIAL TRAPEZIAL NECK
LOCATION DETAILED: RIGHT POSTERIOR SHOULDER
LOCATION DETAILED: RIGHT CENTRAL LATERAL NECK

## 2022-06-09 ASSESSMENT — LOCATION ZONE DERM
LOCATION ZONE: FACE
LOCATION ZONE: NECK
LOCATION ZONE: LEG
LOCATION ZONE: HAND
LOCATION ZONE: TRUNK
LOCATION ZONE: ARM

## 2022-06-09 ASSESSMENT — SEVERITY ASSESSMENT: SEVERITY: MILD TO MODERATE

## 2022-06-09 NOTE — PROCEDURE: LIQUID NITROGEN
Duration Of Freeze Thaw-Cycle (Seconds): 3
Post-Care Instructions: I reviewed with the patient in detail post-care instructions. Patient is to wear sunprotection, and avoid picking at any of the treated lesions. Pt may apply Vaseline to crusted or scabbing areas.
Show Applicator Variable?: Yes
Render Note In Bullet Format When Appropriate: No
Detail Level: Detailed
Consent: The patient's consent was obtained including but not limited to risks of crusting, scabbing, blistering, scarring, darker or lighter pigmentary change, recurrence, incomplete removal and infection.
Number Of Freeze-Thaw Cycles: 2 freeze-thaw cycles
Spray Paint Text: The liquid nitrogen was applied to the skin utilizing a spray paint frosting technique.
Medical Necessity Information: It is in your best interest to select a reason for this procedure from the list below. All of these items fulfill various CMS LCD requirements except the new and changing color options.
Application Tool (Optional): Liquid Nitrogen Sprayer
Number Of Freeze-Thaw Cycles: 3 freeze-thaw cycles
Medical Necessity Clause: This procedure was medically necessary because the lesions that were treated were:

## 2022-06-09 NOTE — PROCEDURE: MIPS QUALITY
Quality 110: Preventive Care And Screening: Influenza Immunization: Influenza Immunization not Administered for Documented Reasons.
Detail Level: Detailed
Quality 226: Preventive Care And Screening: Tobacco Use: Screening And Cessation Intervention: Patient screened for tobacco use and is an ex/non-smoker
Quality 111:Pneumonia Vaccination Status For Older Adults: Documentation of medical reason(s) for not administering pneumococcal vaccine (e.g., adverse reaction to vaccine)
Quality 130: Documentation Of Current Medications In The Medical Record: Current Medications Documented

## 2022-06-15 RX ORDER — LISINOPRIL AND HYDROCHLOROTHIAZIDE 20; 12.5 MG/1; MG/1
TABLET ORAL
Qty: 90 TABLET | Refills: 3 | Status: SHIPPED | OUTPATIENT
Start: 2022-06-15 | End: 2023-06-20

## 2022-07-01 ENCOUNTER — RX ONLY (OUTPATIENT)
Age: 77
Setting detail: RX ONLY
End: 2022-07-01

## 2022-07-01 RX ORDER — DOXEPIN HYDROCHLORIDE 25 MG/1
CAPSULE ORAL QD
Qty: 90 | Refills: 5 | Status: ERX

## 2022-07-09 DIAGNOSIS — R06.02 SHORTNESS OF BREATH: ICD-10-CM

## 2022-07-11 RX ORDER — MONTELUKAST SODIUM 10 MG/1
TABLET ORAL
Qty: 90 TABLET | Refills: 1 | Status: SHIPPED | OUTPATIENT
Start: 2022-07-11 | End: 2022-12-30

## 2022-07-11 NOTE — TELEPHONE ENCOUNTER
Have we ever prescribed this med? Yes.  If yes, what date? 1/10/2022    Last OV: 5/5/2022 KELI GAGE     Next OV: No appt on file; pt not due back until 5/5/2023    DX: Shortness of breath (R06.02)    Medications: montelukast (SINGULAIR) 10 MG Tab

## 2022-07-13 RX ORDER — ATORVASTATIN CALCIUM 20 MG/1
TABLET, FILM COATED ORAL
Qty: 90 TABLET | Refills: 3 | Status: SHIPPED | OUTPATIENT
Start: 2022-07-13 | End: 2023-05-18

## 2022-08-05 DIAGNOSIS — J45.909 UNCOMPLICATED ASTHMA, UNSPECIFIED ASTHMA SEVERITY, UNSPECIFIED WHETHER PERSISTENT: ICD-10-CM

## 2022-08-05 RX ORDER — ALBUTEROL SULFATE 90 UG/1
2 AEROSOL, METERED RESPIRATORY (INHALATION) EVERY 4 HOURS PRN
Qty: 3 EACH | Refills: 3 | Status: SHIPPED | OUTPATIENT
Start: 2022-08-05 | End: 2022-12-27

## 2022-08-05 NOTE — TELEPHONE ENCOUNTER
Pt called in and left a  requesting refill on her Albuterol HFA rescue inhaler. She informed she uses this everyday before she takes her dog for a walk. She would like it sent to [facility] [address]. She would prefer a 90 day supply.

## 2022-09-16 ENCOUNTER — HOSPITAL ENCOUNTER (OUTPATIENT)
Dept: LAB | Facility: MEDICAL CENTER | Age: 77
End: 2022-09-16
Attending: NURSE PRACTITIONER
Payer: MEDICARE

## 2022-09-16 DIAGNOSIS — E78.5 DYSLIPIDEMIA: ICD-10-CM

## 2022-09-16 DIAGNOSIS — E11.9 TYPE 2 DIABETES MELLITUS WITHOUT COMPLICATION, WITHOUT LONG-TERM CURRENT USE OF INSULIN (HCC): ICD-10-CM

## 2022-09-16 LAB
ALBUMIN SERPL BCP-MCNC: 4.2 G/DL (ref 3.2–4.9)
ALBUMIN/GLOB SERPL: 1.7 G/DL
ALP SERPL-CCNC: 87 U/L (ref 30–99)
ALT SERPL-CCNC: 32 U/L (ref 2–50)
ANION GAP SERPL CALC-SCNC: 12 MMOL/L (ref 7–16)
AST SERPL-CCNC: 24 U/L (ref 12–45)
BILIRUB SERPL-MCNC: 0.3 MG/DL (ref 0.1–1.5)
BUN SERPL-MCNC: 21 MG/DL (ref 8–22)
CALCIUM SERPL-MCNC: 9.1 MG/DL (ref 8.5–10.5)
CHLORIDE SERPL-SCNC: 104 MMOL/L (ref 96–112)
CHOLEST SERPL-MCNC: 136 MG/DL (ref 100–199)
CO2 SERPL-SCNC: 26 MMOL/L (ref 20–33)
CREAT SERPL-MCNC: 0.96 MG/DL (ref 0.5–1.4)
CREAT UR-MCNC: 121.73 MG/DL
EST. AVERAGE GLUCOSE BLD GHB EST-MCNC: 146 MG/DL
FASTING STATUS PATIENT QL REPORTED: NORMAL
GFR SERPLBLD CREATININE-BSD FMLA CKD-EPI: 61 ML/MIN/1.73 M 2
GLOBULIN SER CALC-MCNC: 2.5 G/DL (ref 1.9–3.5)
GLUCOSE SERPL-MCNC: 137 MG/DL (ref 65–99)
HBA1C MFR BLD: 6.7 % (ref 4–5.6)
HDLC SERPL-MCNC: 53 MG/DL
LDLC SERPL CALC-MCNC: 55 MG/DL
MICROALBUMIN UR-MCNC: 4 MG/DL
MICROALBUMIN/CREAT UR: 33 MG/G (ref 0–30)
POTASSIUM SERPL-SCNC: 4.5 MMOL/L (ref 3.6–5.5)
PROT SERPL-MCNC: 6.7 G/DL (ref 6–8.2)
SODIUM SERPL-SCNC: 142 MMOL/L (ref 135–145)
TRIGL SERPL-MCNC: 142 MG/DL (ref 0–149)
TSH SERPL DL<=0.005 MIU/L-ACNC: 3.64 UIU/ML (ref 0.38–5.33)

## 2022-09-16 PROCEDURE — 82043 UR ALBUMIN QUANTITATIVE: CPT

## 2022-09-16 PROCEDURE — 82570 ASSAY OF URINE CREATININE: CPT

## 2022-09-16 PROCEDURE — 83036 HEMOGLOBIN GLYCOSYLATED A1C: CPT | Mod: GA

## 2022-09-16 PROCEDURE — 80061 LIPID PANEL: CPT

## 2022-09-16 PROCEDURE — 84443 ASSAY THYROID STIM HORMONE: CPT

## 2022-09-16 PROCEDURE — 36415 COLL VENOUS BLD VENIPUNCTURE: CPT

## 2022-09-16 PROCEDURE — 80053 COMPREHEN METABOLIC PANEL: CPT

## 2022-09-23 ENCOUNTER — OFFICE VISIT (OUTPATIENT)
Dept: MEDICAL GROUP | Facility: MEDICAL CENTER | Age: 77
End: 2022-09-23
Payer: MEDICARE

## 2022-09-23 VITALS
HEART RATE: 83 BPM | BODY MASS INDEX: 32.81 KG/M2 | SYSTOLIC BLOOD PRESSURE: 130 MMHG | TEMPERATURE: 97.4 F | WEIGHT: 204.15 LBS | DIASTOLIC BLOOD PRESSURE: 56 MMHG | OXYGEN SATURATION: 94 % | HEIGHT: 66 IN

## 2022-09-23 DIAGNOSIS — Z86.19 HISTORY OF HEPATITIS B: ICD-10-CM

## 2022-09-23 DIAGNOSIS — E11.9 TYPE 2 DIABETES MELLITUS WITHOUT COMPLICATION, WITHOUT LONG-TERM CURRENT USE OF INSULIN (HCC): ICD-10-CM

## 2022-09-23 DIAGNOSIS — Z23 NEED FOR VACCINATION: ICD-10-CM

## 2022-09-23 DIAGNOSIS — F41.9 ANXIETY: ICD-10-CM

## 2022-09-23 DIAGNOSIS — I10 BENIGN ESSENTIAL HYPERTENSION: ICD-10-CM

## 2022-09-23 DIAGNOSIS — M62.830 MUSCLE SPASM OF BACK: ICD-10-CM

## 2022-09-23 DIAGNOSIS — E78.5 DYSLIPIDEMIA: ICD-10-CM

## 2022-09-23 DIAGNOSIS — R79.89 ELEVATED LFTS: ICD-10-CM

## 2022-09-23 PROCEDURE — G0008 ADMIN INFLUENZA VIRUS VAC: HCPCS | Performed by: NURSE PRACTITIONER

## 2022-09-23 PROCEDURE — 90662 IIV NO PRSV INCREASED AG IM: CPT | Performed by: NURSE PRACTITIONER

## 2022-09-23 PROCEDURE — 99214 OFFICE O/P EST MOD 30 MIN: CPT | Mod: 25 | Performed by: NURSE PRACTITIONER

## 2022-09-23 RX ORDER — ALPRAZOLAM 0.5 MG/1
0.5 TABLET ORAL
Qty: 30 TABLET | Refills: 5 | Status: SHIPPED | OUTPATIENT
Start: 2022-09-23 | End: 2023-03-03 | Stop reason: SDUPTHER

## 2022-09-23 RX ORDER — CYCLOBENZAPRINE HCL 5 MG
5 TABLET ORAL NIGHTLY PRN
Qty: 30 TABLET | Refills: 0 | Status: SHIPPED | OUTPATIENT
Start: 2022-09-23 | End: 2022-12-27

## 2022-09-23 RX ORDER — ALPRAZOLAM 0.5 MG/1
0.5 TABLET ORAL
Qty: 30 TABLET | Refills: 5 | Status: SHIPPED | OUTPATIENT
Start: 2022-09-23 | End: 2022-09-23

## 2022-09-23 ASSESSMENT — FIBROSIS 4 INDEX: FIB4 SCORE: 1.5

## 2022-09-23 NOTE — PROGRESS NOTES
Chief Complaint   Patient presents with    Diabetes Follow-up     6 Mth       Subjective:     HPI:     Judy Bland is a 77 y.o. female   here to discuss the evaluation and management of:     Review labs.     1. Type 2 diabetes mellitus without complication, without long-term current use of insulin (HCC)  A1c 6.7% on recent labs. Very scant +microalbuminuria present. Taking Metformin. Walking dog daily.      2. Dyslipidemia  On statin therapy.     3. Benign essential hypertension  Controlled on lisinopril/hctz and diltiazem.  Last GFR >60.     4. Anxiety  Taking Xanax for this. Following up every 6 months for this. 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      5. Elevated LFTs  Improving on most recent labs.     6. 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    7. Muscle spasm of back  Having muscles spasm in back and it appears to be causing some sharp pains in her feet. Only happens when laying in bed. Had a sharp pain in her L gluteal area a few weeks ago. Requesting muscle relaxor.         ROS: : see above      Current Outpatient Medications:     ALPRAZolam (XANAX) 0.5 MG Tab, Take 1 Tablet by mouth one time as needed for Anxiety for up to 30 doses., Disp: 30 Tablet, Rfl: 5    cyclobenzaprine (FLEXERIL) 5 mg tablet, Take 1 Tablet by mouth at bedtime as needed for Muscle Spasms., Disp: 30 Tablet, Rfl: 0    albuterol (VENTOLIN HFA) 108 (90 Base) MCG/ACT Aero Soln inhalation aerosol, Inhale 2 Puffs every four hours as needed for Shortness of Breath., Disp: 3 Each, Rfl: 3    DILTIAZem (CARDIZEM) 60 MG Tab, TAKE 1 TABLET TWICE A DAY, Disp: 180 Tablet, Rfl: 0    atorvastatin (LIPITOR) 20 MG Tab, TAKE 1 TABLET AT BEDTIME, Disp: 90 Tablet, Rfl: 3    montelukast (SINGULAIR) 10 MG Tab, TAKE 1 TABLET DAILY, Disp: 90 Tablet, Rfl: 1    lisinopril-hydrochlorothiazide (PRINZIDE) 20-12.5 MG per tablet, TAKE  ONE TABLET BY MOUTH EVERY MORNING, Disp: 90 Tablet, Rfl: 3    metFORMIN ER (GLUCOPHAGE XR) 500 MG TABLET SR 24 HR, TAKE TWO TABLETS BY MOUTH DAILY, Disp: 180 Tablet, Rfl: 3    pantoprazole (PROTONIX) 40 MG Tablet Delayed Response, TAKE 1 TABLET DAILY, Disp: 90 Tablet, Rfl: 3    propranolol (INDERAL) 20 MG Tab, TAKE ONE TABLET BY MOUTH EVERY NIGHT AT BEDTIME, Disp: 90 Tablet, Rfl: 3    budesonide-formoterol (SYMBICORT) 160-4.5 MCG/ACT Aerosol, USE 2 INHALATIONS ORALLY   TWICE DAILY WITH SPACER,   RINSE MOUTH AFTER EACH USE, Disp: 3 Each, Rfl: 3    triamcinolone acetonide (KENALOG) 0.1 % Cream, , Disp: , Rfl:     fluticasone (FLONASE) 50 MCG/ACT nasal spray, Administer 1 Spray into affected nostril(S) 2 times a day. Each Nostril, Disp: 48 g, Rfl: 3    nystatin (MYCOSTATIN) 689457 UNIT/GM Cream topical cream, , Disp: , Rfl:     NYAMYC powder, , Disp: , Rfl:     meloxicam (MOBIC) 15 MG tablet, , Disp: , Rfl:     RESTASIS 0.05 % ophthalmic emulsion, Place 1 Drop in both eyes 2 times a day., Disp: , Rfl:     doxepin (SINEQUAN) 25 MG Cap, Take 25 mg by mouth every day., Disp: , Rfl:     Cetirizine HCl (ZYRTEC PO), Take 1 Each by mouth every day., Disp: , Rfl:     Acetaminophen (TYLENOL PO), Take  by mouth. EXTRA STRENGH 2 TABS PRN, Disp: , Rfl:     Allergies   Allergen Reactions    Ultram [Tramadol Hcl]      ELEVATED LIVER ENZYMES       Past Medical History:   Diagnosis Date    Acquired polycythemia 2/15/2012    Anesthesia     PONV    Arthritis     Arthropathy 2/15/2012    Benign essential hypertension 2/15/2012    Chronic hepatitis B (HCC) 2/15/2012    Cough 2/15/2012    Difficulty breathing 2/15/2012    Esophageal reflux 2/15/2012    Heart burn     Hepatitis B     Hypercholesterolemia 2/15/2012    Hyperlipidemia 2/15/2012    Hypertension     Indigestion     Jaundice     Murmur 2/15/2012    Obesity 2/15/2012    BACILIO (obstructive sleep apnea) 2/15/2012    Sinus tachycardia 2/15/2012     Past Surgical History:   Procedure  Laterality Date    SEPTOPLASTY  3/12/2012    Performed by MEHDI SWAIN at SURGERY SAME DAY [facility]    TURBINOPLASTY  3/12/2012    Performed by MEHDI SWAIN at SURGERY SAME DAY [facility]    SINUSOTOMIES  3/12/2012    Performed by MEHDI SWAIN at SURGERY SAME DAY [facility]    TRIGGER FINGER RELEASE  11/23/2009    Performed by MEHDI ONEIL at SURGERY [facility] ORS    CARPAL TUNNEL RELEASE  2009    b/L    KNEE ARTHROPLASTY TOTAL  4/8/08    Performed by ROHAN BLACKWELL at SURGERY [facility] ORS    KNEE ARTHROPLASTY TOTAL  4/8/08    Performed by ROHAN BLACKWELL at SURGERY [facility] ORS    CATARACT EXTRACTION WITH IOL  2008    OU    ROTATOR CUFF REPAIR  2005    left    TRIGGER FINGER RELEASE  2005    left    CERVICAL CONIZATION      Loop Electrode Excision    HAND SURGERY      PRIMARY C SECTION       Family History   Problem Relation Age of Onset    Other Mother         Family hx of Coronary Arteriosclerosis    Stroke Mother     Other Father         Family hx of Coronary Arteriosclerosis    Heart Attack Father 66     Social History     Socioeconomic History    Marital status:      Spouse name: Not on file    Number of children: Not on file    Years of education: Not on file    Highest education level: Not on file   Occupational History    Not on file   Tobacco Use    Smoking status: Never    Smokeless tobacco: Never   Vaping Use    Vaping Use: Never used   Substance and Sexual Activity    Alcohol use: Yes     Alcohol/week: 0.6 oz     Types: 1 Glasses of wine per week     Comment: rarely     Drug use: No    Sexual activity: Not Currently   Other Topics Concern    Not on file   Social History Narrative    Not on file     Social Determinants of Health     Financial Resource Strain: Not on file   Food Insecurity: Not on file   Transportation Needs: Not on file   Physical Activity: Not on file   Stress: Not on file   Social Connections: Not on file   Intimate Partner Violence: Not on file  "  Housing Stability: Not on file       Objective:     Vitals: /56 (BP Location: Right arm, Patient Position: Sitting, BP Cuff Size: Adult)   Pulse 83   Temp 36.3 °C (97.4 °F) (Temporal)   Ht 1.676 m (5' 6\")   Wt 92.6 kg (204 lb 2.3 oz)   SpO2 94%   BMI 32.95 kg/m²    General: Alert, pleasant, NAD  HEENT: Normocephalic.  Neck supple.   Respiratory: no distress, no audible wheezing, RR -WNL  Skin: Warm, dry, no rashes.  Extremities: No leg edema. No discoloration  Neurological: No tremors  Psych:  Affect/mood is normal, judgement is good, memory is intact, grooming is appropriate.    Assessment/Plan:     Judy was seen today for diabetes follow-up.    Diagnoses and all orders for this visit:    Type 2 diabetes mellitus without complication, without long-term current use of insulin (HCC)  Chronic. A1c 6.7%. continue to work on lifestyle modifications. Check A1c next OV. Remind patient about retinal exam.     Dyslipidemia  Chronic. Stable continue current regein.     Benign essential hypertension  Chronic. Stable. Continue current regimen, lifestyle management.   Anxiety  Chronic. Stable. Xanax continues to be helpful for this.  checked. Rx sent f/u 6  months.   -     ALPRAZolam (XANAX) 0.5 MG Tab; Take 1 Tablet by mouth one time as needed for Anxiety for up to 30 doses.    Elevated LFTs  Improving. Ongoing Monitor.     History of hepatitis B  History of >10 year ago.    Muscle spasm of back  Acute, recommend heat, Epson salt soaks    Need for vaccination  -     Influenza Vaccine, High Dose (65+ Only)  -     cyclobenzaprine (FLEXERIL) 5 mg tablet; Take 1 Tablet by mouth at bedtime as needed for Muscle Spasms.      Return in about 6 months (around 3/23/2023).    {I have placed the above orders and discussed them with an approved delegating provider.  The MA is performing the below orders under the direction of Dr. Ariela SIDHU.  "

## 2022-10-11 ENCOUNTER — OFFICE VISIT (OUTPATIENT)
Dept: CARDIOLOGY | Facility: MEDICAL CENTER | Age: 77
End: 2022-10-11
Payer: MEDICARE

## 2022-10-11 VITALS
RESPIRATION RATE: 16 BRPM | SYSTOLIC BLOOD PRESSURE: 126 MMHG | BODY MASS INDEX: 32.3 KG/M2 | HEIGHT: 66 IN | HEART RATE: 89 BPM | WEIGHT: 201 LBS | OXYGEN SATURATION: 93 % | DIASTOLIC BLOOD PRESSURE: 68 MMHG

## 2022-10-11 DIAGNOSIS — I25.10 CORONARY ARTERY CALCIFICATION SEEN ON CAT SCAN: ICD-10-CM

## 2022-10-11 DIAGNOSIS — E78.5 DYSLIPIDEMIA: Chronic | ICD-10-CM

## 2022-10-11 DIAGNOSIS — I10 ESSENTIAL HYPERTENSION: ICD-10-CM

## 2022-10-11 PROCEDURE — 99214 OFFICE O/P EST MOD 30 MIN: CPT | Performed by: INTERNAL MEDICINE

## 2022-10-11 ASSESSMENT — FIBROSIS 4 INDEX: FIB4 SCORE: 1.5

## 2022-10-11 ASSESSMENT — ENCOUNTER SYMPTOMS
SHORTNESS OF BREATH: 0
PALPITATIONS: 0
MYALGIAS: 0
COUGH: 0
LOSS OF CONSCIOUSNESS: 0
DIZZINESS: 0

## 2022-10-11 NOTE — PROGRESS NOTES
Chief Complaint   Patient presents with    Coronary Artery Disease     F/V Dx: Coronary artery disease due to calcified coronary lesion       Subjective     Judy Bland is a 77 y.o. female who presents today for follow-up cardiac care.    The patient has a past medical history of coronary calcification, hypertension, dyslipidemia, chronic respiratory failure on home O2, BACILIO on CPAP, asthma, cervical and lumbar spine DDD, T2DM, chronic low back pain..     Last seen nearly 2 years ago 12/20/2020, since that appointment the patient has had no cardiac symptoms including chest pain, palpitations, shortness of breath.  1.5 years ago the patient and her  bought a 5-year-old lamb retriever to motivate them to take daily walks which she does being limited mainly due to low back pain and her asthma.  Denies anginal chest pain.  Her , a retired nurse anesthetist at the [employer] has Alzheimer's.    Social history  Originally from [place] and subsequent [facility]     Past Medical History:   Diagnosis Date    Acquired polycythemia 2/15/2012    Anesthesia     PONV    Arthritis     Arthropathy 2/15/2012    Benign essential hypertension 2/15/2012    Chronic hepatitis B (HCC) 2/15/2012    Cough 2/15/2012    Difficulty breathing 2/15/2012    Esophageal reflux 2/15/2012    Heart burn     Hepatitis B     Hypercholesterolemia 2/15/2012    Hyperlipidemia 2/15/2012    Hypertension     Indigestion     Jaundice     Murmur 2/15/2012    Obesity 2/15/2012    BACILIO (obstructive sleep apnea) 2/15/2012    Sinus tachycardia 2/15/2012     Past Surgical History:   Procedure Laterality Date    SEPTOPLASTY  3/12/2012    Performed by MEHDI SWAIN at SURGERY [facility] DAY [facility] ORS    TURBINOPLASTY  3/12/2012    Performed by MEHDI SWAIN at SURGERY SAME DAY [facility] ORS    SINUSOTOMIES  3/12/2012    Performed by MEHDI SWAIN at [facility]    TRIGGER FINGER RELEASE  11/23/2009    Performed by  MEHDI ONEIL at SURGERY [facility] ORS    CARPAL TUNNEL RELEASE  2009    b/L    KNEE ARTHROPLASTY TOTAL  4/8/08    Performed by ROHAN BLACKWELL at SURGERY [facility] ORS    KNEE ARTHROPLASTY TOTAL  4/8/08    Performed by ROHAN BLACKWELL at SURGERY [facility] ORS    CATARACT EXTRACTION WITH IOL  2008    OU    ROTATOR CUFF REPAIR  2005    left    TRIGGER FINGER RELEASE  2005    left    CERVICAL CONIZATION      Loop Electrode Excision    HAND SURGERY      PRIMARY C SECTION       Family History   Problem Relation Age of Onset    Other Mother         Family hx of Coronary Arteriosclerosis    Stroke Mother     Other Father         Family hx of Coronary Arteriosclerosis    Heart Attack Father 66     Social History     Socioeconomic History    Marital status:      Spouse name: Not on file    Number of children: Not on file    Years of education: Not on file    Highest education level: Not on file   Occupational History    Not on file   Tobacco Use    Smoking status: Never    Smokeless tobacco: Never   Vaping Use    Vaping Use: Never used   Substance and Sexual Activity    Alcohol use: Yes     Alcohol/week: 0.6 oz     Types: 1 Glasses of wine per week     Comment: rarely     Drug use: No    Sexual activity: Not Currently   Other Topics Concern    Not on file   Social History Narrative    Not on file     Social Determinants of Health     Financial Resource Strain: Not on file   Food Insecurity: Not on file   Transportation Needs: Not on file   Physical Activity: Not on file   Stress: Not on file   Social Connections: Not on file   Intimate Partner Violence: Not on file   Housing Stability: Not on file     Allergies   Allergen Reactions    Ultram [Tramadol Hcl]      ELEVATED LIVER ENZYMES     Outpatient Encounter Medications as of 10/11/2022   Medication Sig Dispense Refill    ALPRAZolam (XANAX) 0.5 MG Tab Take 1 Tablet by mouth one time as needed for Anxiety for up to 30 doses. 30 Tablet 5    cyclobenzaprine  (FLEXERIL) 5 mg tablet Take 1 Tablet by mouth at bedtime as needed for Muscle Spasms. (Patient taking differently: Take 5 mg by mouth every day.) 30 Tablet 0    albuterol (VENTOLIN HFA) 108 (90 Base) MCG/ACT Aero Soln inhalation aerosol Inhale 2 Puffs every four hours as needed for Shortness of Breath. 3 Each 3    DILTIAZem (CARDIZEM) 60 MG Tab TAKE 1 TABLET TWICE A  Tablet 0    atorvastatin (LIPITOR) 20 MG Tab TAKE 1 TABLET AT BEDTIME 90 Tablet 3    montelukast (SINGULAIR) 10 MG Tab TAKE 1 TABLET DAILY 90 Tablet 1    lisinopril-hydrochlorothiazide (PRINZIDE) 20-12.5 MG per tablet TAKE ONE TABLET BY MOUTH EVERY MORNING 90 Tablet 3    metFORMIN ER (GLUCOPHAGE XR) 500 MG TABLET SR 24 HR TAKE TWO TABLETS BY MOUTH DAILY (Patient taking differently: Take 1,500 mg by mouth every day.) 180 Tablet 3    pantoprazole (PROTONIX) 40 MG Tablet Delayed Response TAKE 1 TABLET DAILY (Patient taking differently: Take 40 mg by mouth 2 times a day.) 90 Tablet 3    propranolol (INDERAL) 20 MG Tab TAKE ONE TABLET BY MOUTH EVERY NIGHT AT BEDTIME 90 Tablet 3    budesonide-formoterol (SYMBICORT) 160-4.5 MCG/ACT Aerosol USE 2 INHALATIONS ORALLY   TWICE DAILY WITH SPACER,   RINSE MOUTH AFTER EACH USE 3 Each 3    triamcinolone acetonide (KENALOG) 0.1 % Cream Apply  topically as needed.      fluticasone (FLONASE) 50 MCG/ACT nasal spray Administer 1 Spray into affected nostril(S) 2 times a day. Each Nostril 48 g 3    nystatin (MYCOSTATIN) 068201 UNIT/GM Cream topical cream Apply 1 Application topically as needed.      meloxicam (MOBIC) 15 MG tablet Take 15 mg by mouth every day.      RESTASIS 0.05 % ophthalmic emulsion Place 1 Drop in both eyes 2 times a day.      doxepin (SINEQUAN) 25 MG Cap Take 25 mg by mouth every day.      Cetirizine HCl (ZYRTEC PO) Take 1 Each by mouth as needed.      Acetaminophen (TYLENOL PO) Take  by mouth. EXTRA STRENGH 2 TABS PRN      [DISCONTINUED] NYAMYC powder  (Patient not taking: Reported on 10/11/2022)    "    No facility-administered encounter medications on file as of 10/11/2022.     Review of Systems   Respiratory:  Negative for cough and shortness of breath.    Cardiovascular:  Negative for chest pain and palpitations.   Musculoskeletal:  Negative for myalgias.   Neurological:  Negative for dizziness and loss of consciousness.            Objective     /68 (BP Location: Left arm, Patient Position: Sitting, BP Cuff Size: Adult)   Pulse 89   Resp 16   Ht 1.676 m (5' 6\")   Wt 91.2 kg (201 lb)   SpO2 93%   BMI 32.44 kg/m²     Physical Exam  Constitutional:       General: She is not in acute distress.     Appearance: She is well-developed.   Eyes:      Conjunctiva/sclera: Conjunctivae normal.      Pupils: Pupils are equal, round, and reactive to light.   Neck:      Vascular: No JVD.   Cardiovascular:      Rate and Rhythm: Normal rate and regular rhythm.      Pulses:           Carotid pulses are 1+ on the right side and 1+ on the left side.       Radial pulses are 1+ on the right side and 1+ on the left side.        Posterior tibial pulses are 1+ on the right side and 1+ on the left side.      Heart sounds: Normal heart sounds. No murmur heard.  Pulmonary:      Effort: Pulmonary effort is normal. No accessory muscle usage or respiratory distress.      Breath sounds: Normal breath sounds. No wheezing or rales.   Abdominal:      Comments: Protuberant.   Musculoskeletal:      Cervical back: Normal range of motion and neck supple.      Right lower leg: No edema.      Left lower leg: No edema.   Skin:     General: Skin is warm and dry.      Findings: No rash.      Nails: There is no clubbing.   Neurological:      Mental Status: She is alert and oriented to person, place, and time.   Psychiatric:         Behavior: Behavior normal.            PFTs.  INTERPRETATION: Lung function testing completed July 26, 2017 revealed normal spirometry. No change after bronchodilators. Lung volumes do not demonstrate significant " restriction or hyperinflation. Low expiratory reserve volume is consistent with the patient's elevated BMI. Oxygen transfer was normal. Good effort noted.     03/4/2011 ECHOCARDIOGRAM  EF greater than 65%. Mild to moderate left hypertrophy. Pulmonary pressures cannot be assessed.     03/06/2015 ECHOCARDIOGRAM  Normal left ventricular systolic function.  Normal regional wall motion with vigorous global contractility.  Left ventricular ejection fraction is 65% to 70%.  Normal right ventricular systolic function.  Aortic sclerosis without stenosis.  Right ventricular systolic pressure is estimated to be 24-29 mmHg.  Anterior echo free space with some enhanced echogenicity of the   posterior pericardium; uncertain significance.      MPI 06/18/2019  Normal myocardial perfusion with no ischemia.   Normal left ventricular wall motion.  LV ejection fraction = 76%.   TID absent.     TREADMILL 04/02/2018  1. Limited exercise capacity but adequate heart rate response.  2. Negative exercise treadmill stress test for ischemia.  3. No arrhythmias.  4. Hypertensive response to exercise.     ABDOMINAL CT SCAN 2013  Dense diffuse ostial, proximal and mid LAD calcification     03/16/2017 EKG: Normal sinus rhythm, rate 99. Isolated PVC.  Assessment & Plan     1. Coronary artery calcification seen on CAT scan        2. Essential hypertension        3. Dyslipidemia            Medical Decision Making: Today's Assessment/Status/Plan:   Assessment  1.  Coronary artery disease as demonstrated by coronary calcification on CT scan.  2.  Hypertension.    3.  Hyperlipidemia. On atorvastatin.  4.  Asthma.  5.  Sleep apnea on CPAP.  6.  Overweight.  7.  Diabetes mellitus.  8.  Cervical/lumbar disease.     Recommendations  1.  Coronary calcification with prior normal ETT, asymptomatic, no angina pectoris, continue atorvastatin, propranolol, diltiazem  2.  Hypertension: BP normal, at goal, continue HCTZ, lisinopril  3.  Dyslipidemia, LDL 55, at  goal, continue atorvastatin, reviewed results with patient  4.  RTC 1 year

## 2022-11-03 ENCOUNTER — PATIENT MESSAGE (OUTPATIENT)
Dept: HEALTH INFORMATION MANAGEMENT | Facility: OTHER | Age: 77
End: 2022-11-03

## 2022-11-10 RX ORDER — PROPRANOLOL HYDROCHLORIDE 20 MG/1
TABLET ORAL
Qty: 90 TABLET | Refills: 3 | Status: SHIPPED | OUTPATIENT
Start: 2022-11-10 | End: 2023-11-10

## 2022-12-27 ENCOUNTER — HOSPITAL ENCOUNTER (OUTPATIENT)
Facility: MEDICAL CENTER | Age: 77
End: 2022-12-27
Attending: NURSE PRACTITIONER
Payer: MEDICARE

## 2022-12-27 ENCOUNTER — HOSPITAL ENCOUNTER (OUTPATIENT)
Dept: RADIOLOGY | Facility: MEDICAL CENTER | Age: 77
End: 2022-12-27
Attending: NURSE PRACTITIONER
Payer: MEDICARE

## 2022-12-27 ENCOUNTER — OFFICE VISIT (OUTPATIENT)
Dept: URGENT CARE | Facility: PHYSICIAN GROUP | Age: 77
End: 2022-12-27
Payer: MEDICARE

## 2022-12-27 ENCOUNTER — HOSPITAL ENCOUNTER (OUTPATIENT)
Dept: LAB | Facility: MEDICAL CENTER | Age: 77
End: 2022-12-27
Attending: NURSE PRACTITIONER
Payer: MEDICARE

## 2022-12-27 VITALS
DIASTOLIC BLOOD PRESSURE: 78 MMHG | WEIGHT: 199 LBS | SYSTOLIC BLOOD PRESSURE: 140 MMHG | HEART RATE: 119 BPM | OXYGEN SATURATION: 94 % | BODY MASS INDEX: 31.98 KG/M2 | TEMPERATURE: 97.7 F | HEIGHT: 66 IN | RESPIRATION RATE: 18 BRPM

## 2022-12-27 DIAGNOSIS — R42 DIZZINESS: ICD-10-CM

## 2022-12-27 DIAGNOSIS — N30.00 ACUTE CYSTITIS WITHOUT HEMATURIA: ICD-10-CM

## 2022-12-27 DIAGNOSIS — R55 SYNCOPE, UNSPECIFIED SYNCOPE TYPE: ICD-10-CM

## 2022-12-27 LAB
ALBUMIN SERPL BCP-MCNC: 4.8 G/DL (ref 3.2–4.9)
ALBUMIN/GLOB SERPL: 1.7 G/DL
ALP SERPL-CCNC: 97 U/L (ref 30–99)
ALT SERPL-CCNC: 56 U/L (ref 2–50)
AMBIGUOUS DTTM AMBI4: NORMAL
ANION GAP SERPL CALC-SCNC: 15 MMOL/L (ref 7–16)
APPEARANCE UR: CLEAR
AST SERPL-CCNC: 49 U/L (ref 12–45)
BASOPHILS # BLD AUTO: 0.9 % (ref 0–1.8)
BASOPHILS # BLD: 0.09 K/UL (ref 0–0.12)
BILIRUB SERPL-MCNC: 0.4 MG/DL (ref 0.1–1.5)
BILIRUB UR STRIP-MCNC: NEGATIVE MG/DL
BUN SERPL-MCNC: 14 MG/DL (ref 8–22)
CALCIUM ALBUM COR SERPL-MCNC: 9.6 MG/DL (ref 8.5–10.5)
CALCIUM SERPL-MCNC: 10.2 MG/DL (ref 8.5–10.5)
CHLORIDE SERPL-SCNC: 103 MMOL/L (ref 96–112)
CO2 SERPL-SCNC: 23 MMOL/L (ref 20–33)
COLOR UR AUTO: YELLOW
CREAT SERPL-MCNC: 0.81 MG/DL (ref 0.5–1.4)
EOSINOPHIL # BLD AUTO: 0.19 K/UL (ref 0–0.51)
EOSINOPHIL NFR BLD: 1.8 % (ref 0–6.9)
ERYTHROCYTE [DISTWIDTH] IN BLOOD BY AUTOMATED COUNT: 49 FL (ref 35.9–50)
GFR SERPLBLD CREATININE-BSD FMLA CKD-EPI: 75 ML/MIN/1.73 M 2
GLOBULIN SER CALC-MCNC: 2.9 G/DL (ref 1.9–3.5)
GLUCOSE SERPL-MCNC: 152 MG/DL (ref 65–99)
GLUCOSE UR STRIP.AUTO-MCNC: NEGATIVE MG/DL
HCT VFR BLD AUTO: 49.5 % (ref 37–47)
HGB BLD-MCNC: 16.1 G/DL (ref 12–16)
IMM GRANULOCYTES # BLD AUTO: 0.04 K/UL (ref 0–0.11)
IMM GRANULOCYTES NFR BLD AUTO: 0.4 % (ref 0–0.9)
KETONES UR STRIP.AUTO-MCNC: NEGATIVE MG/DL
LEUKOCYTE ESTERASE UR QL STRIP.AUTO: NORMAL
LYMPHOCYTES # BLD AUTO: 2.52 K/UL (ref 1–4.8)
LYMPHOCYTES NFR BLD: 24 % (ref 22–41)
MCH RBC QN AUTO: 31.2 PG (ref 27–33)
MCHC RBC AUTO-ENTMCNC: 32.5 G/DL (ref 33.6–35)
MCV RBC AUTO: 95.9 FL (ref 81.4–97.8)
MONOCYTES # BLD AUTO: 0.67 K/UL (ref 0–0.85)
MONOCYTES NFR BLD AUTO: 6.4 % (ref 0–13.4)
NEUTROPHILS # BLD AUTO: 6.98 K/UL (ref 2–7.15)
NEUTROPHILS NFR BLD: 66.5 % (ref 44–72)
NITRITE UR QL STRIP.AUTO: POSITIVE
NRBC # BLD AUTO: 0 K/UL
NRBC BLD-RTO: 0 /100 WBC
PH UR STRIP.AUTO: 6.5 [PH] (ref 5–8)
PLATELET # BLD AUTO: 271 K/UL (ref 164–446)
PMV BLD AUTO: 11.6 FL (ref 9–12.9)
POTASSIUM SERPL-SCNC: 4 MMOL/L (ref 3.6–5.5)
PROT SERPL-MCNC: 7.7 G/DL (ref 6–8.2)
PROT UR QL STRIP: NEGATIVE MG/DL
RBC # BLD AUTO: 5.16 M/UL (ref 4.2–5.4)
RBC UR QL AUTO: NEGATIVE
SODIUM SERPL-SCNC: 141 MMOL/L (ref 135–145)
SP GR UR STRIP.AUTO: 1.01
UROBILINOGEN UR STRIP-MCNC: NORMAL MG/DL
WBC # BLD AUTO: 10.5 K/UL (ref 4.8–10.8)

## 2022-12-27 PROCEDURE — 81002 URINALYSIS NONAUTO W/O SCOPE: CPT | Performed by: NURSE PRACTITIONER

## 2022-12-27 PROCEDURE — 36415 COLL VENOUS BLD VENIPUNCTURE: CPT

## 2022-12-27 PROCEDURE — 85025 COMPLETE CBC W/AUTO DIFF WBC: CPT

## 2022-12-27 PROCEDURE — 87077 CULTURE AEROBIC IDENTIFY: CPT

## 2022-12-27 PROCEDURE — 99214 OFFICE O/P EST MOD 30 MIN: CPT | Performed by: NURSE PRACTITIONER

## 2022-12-27 PROCEDURE — 70450 CT HEAD/BRAIN W/O DYE: CPT

## 2022-12-27 PROCEDURE — 80053 COMPREHEN METABOLIC PANEL: CPT

## 2022-12-27 PROCEDURE — 87086 URINE CULTURE/COLONY COUNT: CPT

## 2022-12-27 PROCEDURE — 87186 SC STD MICRODIL/AGAR DIL: CPT

## 2022-12-27 RX ORDER — NITROFURANTOIN 25; 75 MG/1; MG/1
100 CAPSULE ORAL 2 TIMES DAILY
Qty: 10 CAPSULE | Refills: 0 | Status: SHIPPED | OUTPATIENT
Start: 2022-12-27 | End: 2023-01-01

## 2022-12-27 ASSESSMENT — ENCOUNTER SYMPTOMS
FEVER: 0
RESPIRATORY NEGATIVE: 1
GASTROINTESTINAL NEGATIVE: 1
EYES NEGATIVE: 1
CONSTITUTIONAL NEGATIVE: 1
MUSCULOSKELETAL NEGATIVE: 1
DIZZINESS: 1
PSYCHIATRIC NEGATIVE: 1
CARDIOVASCULAR NEGATIVE: 1

## 2022-12-27 ASSESSMENT — FIBROSIS 4 INDEX: FIB4 SCORE: 1.5

## 2022-12-27 NOTE — PROGRESS NOTES
"Subjective:   Judy Bland is a 77 y.o. female who presents for Dizziness (X4 days dizziness and fell 2 times)      Patient presents today with a chief complaint of dizziness with fall x2 beginning Saturday.  Patient reports she was leaning down, felt the room spinning and fell.  She did not hit her head and did not have any loss of consciousness.  The episode then resolved.  Yesterday she had a similar episode when she bent over in her garden, and became dizzy and fell a second time.  She states that her daughter is an RN and suggested she get tested for flu and COVID.  Patient did do a COVID test at home which was negative, though it was .  Patient denies any chest pain or shortness of breath associated with these episodes.  She denies palpitations.  She denies any fever or chills, or any symptoms of systemic illness.      Dizziness  This is a new problem. The current episode started in the past 7 days (4 days). The problem occurs constantly. The problem has been unchanged. Pertinent negatives include no fever. The symptoms are aggravated by bending. She has tried position changes for the symptoms. The treatment provided no relief.     Review of Systems   Constitutional: Negative.  Negative for fever.   HENT: Negative.     Eyes: Negative.    Respiratory: Negative.     Cardiovascular: Negative.    Gastrointestinal: Negative.    Genitourinary: Negative.    Musculoskeletal: Negative.    Skin: Negative.    Neurological:  Positive for dizziness.   Endo/Heme/Allergies: Negative.    Psychiatric/Behavioral: Negative.       Medications, Allergies, and current problem list reviewed today in Epic.     Objective:     BP (!) 140/78   Pulse (!) 119   Temp 36.5 °C (97.7 °F) (Temporal)   Resp 18   Ht 1.676 m (5' 6\")   Wt 90.3 kg (199 lb)   SpO2 94%     Physical Exam  Vitals reviewed.   Constitutional:       Appearance: Normal appearance.   HENT:      Head: Normocephalic and atraumatic.      Right Ear: " Tympanic membrane, ear canal and external ear normal.      Left Ear: Tympanic membrane, ear canal and external ear normal.      Nose: Nose normal.      Mouth/Throat:      Mouth: Mucous membranes are moist.      Pharynx: Oropharynx is clear.   Eyes:      Extraocular Movements: Extraocular movements intact.      Conjunctiva/sclera: Conjunctivae normal.      Pupils: Pupils are equal, round, and reactive to light.   Cardiovascular:      Rate and Rhythm: Normal rate and regular rhythm.   Pulmonary:      Effort: Pulmonary effort is normal.      Breath sounds: Normal breath sounds.   Abdominal:      General: Abdomen is flat.      Palpations: Abdomen is soft.   Musculoskeletal:         General: Normal range of motion.      Cervical back: Normal range of motion and neck supple.   Skin:     General: Skin is warm and dry.      Capillary Refill: Capillary refill takes less than 2 seconds.   Neurological:      General: No focal deficit present.      Mental Status: She is alert and oriented to person, place, and time. Mental status is at baseline.      Cranial Nerves: Cranial nerves 2-12 are intact.      Sensory: Sensation is intact.      Motor: Motor function is intact.      Coordination: Coordination is intact.   Psychiatric:         Mood and Affect: Mood normal.         Behavior: Behavior normal.       Lab Results/POC Test Results   Results for orders placed or performed in visit on 12/27/22   POCT Urinalysis   Result Value Ref Range    POC Color YELLOW Negative    POC Appearance CLEAR Negative    POC Leukocyte Esterase MODERATE Negative    POC Nitrites POSITIVE Negative    POC Urobiligen 0.2 E.U./dL Negative (0.2) mg/dL    POC Protein NEGATIVE Negative mg/dL    POC Urine PH 6.5 5.0 - 8.0    POC Blood NEGATIVE Negative    POC Specific Gravity 1.010 <1.005 - >1.030    POC Ketones NEGATIVE Negative mg/dL    POC Bilirubin NEGATIVE Negative mg/dL    POC Glucose NEGATIVE Negative mg/dL             Assessment/Plan:     Diagnosis and  associated orders:     1. Syncope, unspecified syncope type  CT-HEAD W/O    CBC WITH DIFFERENTIAL    Comp Metabolic Panel    URINALYSIS    POCT Urinalysis      2. Dizziness  POCT Urinalysis      3. Acute cystitis without hematuria  URINE CULTURE(NEW)    nitrofurantoin (MACROBID) 100 MG Cap         Comments/MDM:     Differential diagnosis could include cardiac arrhythmia, pathology of the brain, infection, electrolyte imbalance, inner ear disturbance, or vertigo, etc.  Discussed with patient that I feel she would be better served with work-up in the ER, but patient prefers outpatient work-up at this time.  Stat CT scan of her head was ordered as well as UA, CBC and CMP.  CT head was negative, CBC and CMP were relatively unremarkable.  UA was positive for leukocyte esterase and nitrites.   Patient will be treated for a UTI at this time. She was advised that this may not explain her symptoms and that she should present to the ER for any further syncopal episodes, chest pain, SOB, or weakness.  She will follow up with her PCP later this week for further evaluation and treatment.          Differential diagnosis, natural history, supportive care, and indications for immediate follow-up discussed.    My total time spent caring for the patient on the day of the encounter was 40 minutes. This does not include time spent on separately billable procedures/tests.    Advised the patient to follow-up with the primary care physician for recheck, reevaluation, and consideration of further management.    Please note that this dictation was created using voice recognition software. I have made a reasonable attempt to correct obvious errors, but I expect that there are errors of grammar and possibly content that I did not discover before finalizing the note.    This note was electronically signed by MERARI Fuentes

## 2022-12-29 DIAGNOSIS — R06.02 SHORTNESS OF BREATH: ICD-10-CM

## 2022-12-29 DIAGNOSIS — J45.30 MILD PERSISTENT ASTHMA WITHOUT COMPLICATION: ICD-10-CM

## 2022-12-30 LAB
BACTERIA UR CULT: ABNORMAL
BACTERIA UR CULT: ABNORMAL
SIGNIFICANT IND 70042: ABNORMAL
SITE SITE: ABNORMAL
SOURCE SOURCE: ABNORMAL

## 2022-12-30 RX ORDER — MONTELUKAST SODIUM 10 MG/1
TABLET ORAL
Qty: 90 TABLET | Refills: 1 | Status: SHIPPED | OUTPATIENT
Start: 2022-12-30 | End: 2023-11-10

## 2022-12-30 RX ORDER — FLUTICASONE PROPIONATE 50 MCG
SPRAY, SUSPENSION (ML) NASAL
Qty: 48 G | Refills: 3 | Status: SHIPPED | OUTPATIENT
Start: 2022-12-30

## 2022-12-30 NOTE — TELEPHONE ENCOUNTER
Have we ever prescribed this med? Yes.  If yes, what date? 12/28/2020    Last OV: 05/05/2022- KELI GAGE     Next OV: No appointment on file-   7.  Follow-up in 1 year to review asthma and compliance report, sooner if needed.    DX: Mild persistent asthma without complication (J45.30)    Medications:   Requested Prescriptions     Pending Prescriptions Disp Refills    fluticasone (FLONASE) 50 MCG/ACT nasal spray [Pharmacy Med Name: FLUTICASONE  SPR 50MCG RX] 48 g 3     Sig: USE 1 SPRAY IN EACH NOSTRILTWICE DAILY

## 2022-12-30 NOTE — TELEPHONE ENCOUNTER
Have we ever prescribed this med? Yes.  If yes, what date? 7/11/2022    Last OV: 5/5/2022 KELI GAGE     Next OV: No follow up on file; pt is due back 5/5/2022    DX: Shortness of breath (R06.02)    Medications: montelukast (SINGULAIR) 10 MG Tab

## 2023-01-03 ENCOUNTER — OFFICE VISIT (OUTPATIENT)
Dept: MEDICAL GROUP | Facility: MEDICAL CENTER | Age: 78
End: 2023-01-03
Payer: MEDICARE

## 2023-01-03 VITALS
HEIGHT: 66 IN | WEIGHT: 198 LBS | TEMPERATURE: 96.9 F | BODY MASS INDEX: 31.82 KG/M2 | OXYGEN SATURATION: 93 % | SYSTOLIC BLOOD PRESSURE: 122 MMHG | HEART RATE: 119 BPM | DIASTOLIC BLOOD PRESSURE: 50 MMHG

## 2023-01-03 DIAGNOSIS — Z12.31 ENCOUNTER FOR SCREENING MAMMOGRAM FOR MALIGNANT NEOPLASM OF BREAST: ICD-10-CM

## 2023-01-03 DIAGNOSIS — R00.0 TACHYCARDIA: ICD-10-CM

## 2023-01-03 DIAGNOSIS — R55 SYNCOPE, UNSPECIFIED SYNCOPE TYPE: ICD-10-CM

## 2023-01-03 DIAGNOSIS — N30.00 ACUTE CYSTITIS WITHOUT HEMATURIA: ICD-10-CM

## 2023-01-03 PROCEDURE — 99214 OFFICE O/P EST MOD 30 MIN: CPT | Performed by: NURSE PRACTITIONER

## 2023-01-03 RX ORDER — PANTOPRAZOLE SODIUM 40 MG/1
TABLET, DELAYED RELEASE ORAL
Qty: 90 TABLET | Refills: 3 | Status: SHIPPED | OUTPATIENT
Start: 2023-01-03 | End: 2023-02-07

## 2023-01-03 ASSESSMENT — PATIENT HEALTH QUESTIONNAIRE - PHQ9: CLINICAL INTERPRETATION OF PHQ2 SCORE: 0

## 2023-01-03 ASSESSMENT — FIBROSIS 4 INDEX: FIB4 SCORE: 1.86

## 2023-01-03 NOTE — PROGRESS NOTES
Chief Complaint   Patient presents with    Fall     [facility]       Subjective:     HPI:     Judy Bland is a 77 y.o. female   here to discuss the evaluation and management of:     Follow up from U.C visit.     Patient was seen for 2 episodes of syncope within about a week time.  She was also diagnosed with a urinary tract infection and prescribed Macrobid.  Culture does show E. coli.  Sensitivities were appropriate for antibiotic that was prescribed.  Patient recounts the events of the 2 episodes.  She denies any loss of consciousness or head injury on either time.  Have reviewed her CT scan she had completed at the urgent care which was reassuring.  No evidence of an intracranial bleed.  Since then she has not had any recurrent episodes.    She does mention that when she lays down in bed she will have a brief episode lasting a few seconds of dizziness.  She reports this has been ongoing for several months.    She does make comment that she may not be as hydrated as she has previously been.     No chest pain, shortness of breath, heart palpitations at this time.  Her heart rate is slightly tachycardic in the clinic today.  She reports compliance with all of her medications.  Recently saw cardiology.    Labs are essentially unremarkable.  Mild bump in her H&H.  Liver enzymes just a touch above normal although she does have a history of having hep C as well as her recent illness.  No ETOH use      ROS: : see above      Current Outpatient Medications:     pantoprazole (PROTONIX) 40 MG Tablet Delayed Response, TAKE 1 TABLET DAILY, Disp: 90 Tablet, Rfl: 3    fluticasone (FLONASE) 50 MCG/ACT nasal spray, USE 1 SPRAY IN EACH NOSTRILTWICE DAILY, Disp: 48 g, Rfl: 3    montelukast (SINGULAIR) 10 MG Tab, TAKE 1 TABLET DAILY, Disp: 90 Tablet, Rfl: 1    dilTIAZem (CARDIZEM) 60 MG Tab, Take 1 Tablet by mouth 2 times a day., Disp: 180 Tablet, Rfl: 3    propranolol (INDERAL) 20 MG Tab, TAKE ONE TABLET BY MOUTH EVERY NIGHT AT  BEDTIME, Disp: 90 Tablet, Rfl: 3    atorvastatin (LIPITOR) 20 MG Tab, TAKE 1 TABLET AT BEDTIME, Disp: 90 Tablet, Rfl: 3    lisinopril-hydrochlorothiazide (PRINZIDE) 20-12.5 MG per tablet, TAKE ONE TABLET BY MOUTH EVERY MORNING, Disp: 90 Tablet, Rfl: 3    metFORMIN ER (GLUCOPHAGE XR) 500 MG TABLET SR 24 HR, TAKE TWO TABLETS BY MOUTH DAILY (Patient taking differently: Take 1,500 mg by mouth every day.), Disp: 180 Tablet, Rfl: 3    budesonide-formoterol (SYMBICORT) 160-4.5 MCG/ACT Aerosol, USE 2 INHALATIONS ORALLY   TWICE DAILY WITH SPACER,   RINSE MOUTH AFTER EACH USE, Disp: 3 Each, Rfl: 3    triamcinolone acetonide (KENALOG) 0.1 % Cream, Apply  topically as needed., Disp: , Rfl:     nystatin (MYCOSTATIN) 207145 UNIT/GM Cream topical cream, Apply 1 Application topically as needed., Disp: , Rfl:     meloxicam (MOBIC) 15 MG tablet, Take 15 mg by mouth every day., Disp: , Rfl:     RESTASIS 0.05 % ophthalmic emulsion, Place 1 Drop in both eyes 2 times a day., Disp: , Rfl:     Cetirizine HCl (ZYRTEC PO), Take 1 Each by mouth as needed., Disp: , Rfl:     Allergies   Allergen Reactions    Ultram [Tramadol Hcl]      ELEVATED LIVER ENZYMES       Past Medical History:   Diagnosis Date    Acquired polycythemia 2/15/2012    Anesthesia     PONV    Arthritis     Arthropathy 2/15/2012    Benign essential hypertension 2/15/2012    Chronic hepatitis B (HCC) 2/15/2012    Cough 2/15/2012    Difficulty breathing 2/15/2012    Esophageal reflux 2/15/2012    Heart burn     Hepatitis B     Hypercholesterolemia 2/15/2012    Hyperlipidemia 2/15/2012    Hypertension     Indigestion     Jaundice     Murmur 2/15/2012    Obesity 2/15/2012    BACILIO (obstructive sleep apnea) 2/15/2012    Sinus tachycardia 2/15/2012     Past Surgical History:   Procedure Laterality Date    SEPTOPLASTY  3/12/2012    Performed by MEHDI SWAIN at SURGERY SAME DAY [facility] ORS    TURBINOPLASTY  3/12/2012    Performed by MEHDI SWAIN at SURGERY SAME DAY [facility] ORS     SINUSOTOMIES  3/12/2012    Performed by MEHDI SWAIN at SURGERY SAME DAY [facility] ORS    TRIGGER FINGER RELEASE  11/23/2009    Performed by MEHDI ONEIL at SURGERY [facility] ORS    CARPAL TUNNEL RELEASE  2009    b/L    KNEE ARTHROPLASTY TOTAL  4/8/08    Performed by ROHAN BLACKWELL at SURGERY [facility] ORS    KNEE ARTHROPLASTY TOTAL  4/8/08    Performed by ROHAN BLACKWELL at SURGERY [facility] ORS    CATARACT EXTRACTION WITH IOL  2008    OU    ROTATOR CUFF REPAIR  2005    left    TRIGGER FINGER RELEASE  2005    left    CERVICAL CONIZATION      Loop Electrode Excision    HAND SURGERY      PRIMARY C SECTION       Family History   Problem Relation Age of Onset    Other Mother         Family hx of Coronary Arteriosclerosis    Stroke Mother     Other Father         Family hx of Coronary Arteriosclerosis    Heart Attack Father 66     Social History     Socioeconomic History    Marital status:      Spouse name: Not on file    Number of children: Not on file    Years of education: Not on file    Highest education level: Not on file   Occupational History    Not on file   Tobacco Use    Smoking status: Never    Smokeless tobacco: Never   Vaping Use    Vaping Use: Never used   Substance and Sexual Activity    Alcohol use: Yes     Alcohol/week: 0.6 oz     Types: 1 Glasses of wine per week     Comment: rarely     Drug use: No    Sexual activity: Not Currently   Other Topics Concern    Not on file   Social History Narrative    Not on file     Social Determinants of Health     Financial Resource Strain: Not on file   Food Insecurity: Not on file   Transportation Needs: Not on file   Physical Activity: Not on file   Stress: Not on file   Social Connections: Not on file   Intimate Partner Violence: Not on file   Housing Stability: Not on file       Objective:     Vitals: /50 (BP Location: Left arm, Patient Position: Sitting, BP Cuff Size: Adult)   Pulse (!) 119   Temp 36.1 °C (96.9 °F) (Temporal)   Ht  "1.676 m (5' 6\")   Wt 89.8 kg (198 lb)   SpO2 93%   BMI 31.96 kg/m²    General: Alert, pleasant, NAD  HEENT: Normocephalic.  Neck supple.   Respiratory: no distress, no audible wheezing, RR -WNL  Skin: Warm, dry, no rashes.  Extremities: No leg edema. No discoloration  Neurological: No tremors  Psych:  Affect/mood is normal, judgement is good, memory is intact, grooming is appropriate.    Assessment/Plan:     Judy was seen today for fall.    Diagnoses and all orders for this visit:    Syncope, unspecified syncope type  New problem. Uncertain prognosis.  Stable in clinic.  No recurring symptoms.  No focal weakness.  No headache, chest pain.  No palpitations.  CT reassuring  Labs without significant findings.   Check ECHO r/o valve dysfunction and EF.   Check carotid U/S to rule out any possible differentials including significant carotid artery stenosis.  Recommend adequate hydration.  Have discussed symptoms may be secondary to dehydration as well as concurrent UTI.  Follow-up after diagnostics.  ER precautions.  -     US-CAROTID DOPPLER BILAT; Future  -     EC-ECHOCARDIOGRAM COMPLETE W/O CONT; Future    Acute cystitis without hematuria  Completed course of antibiotics.  She did not have any \"classic symptoms\"   Recommend adequate hydration.    Tachycardia  New problem, suspect transient, possibly r/t dehydration.   No CP, palpitations.   -recommend monitor at home, increase hydration,   EKG if persistent.   ER precautions. Follow up     Encounter for screening mammogram for malignant neoplasm of breast  -     MA-SCREENING MAMMO BILAT W/TOMOSYNTHESIS W/CAD; Future        Return in about 4 weeks (around 1/31/2023).          Jessi BURROWS  "

## 2023-01-05 ENCOUNTER — HOSPITAL ENCOUNTER (OUTPATIENT)
Dept: CARDIOLOGY | Facility: MEDICAL CENTER | Age: 78
End: 2023-01-05
Attending: NURSE PRACTITIONER
Payer: MEDICARE

## 2023-01-05 DIAGNOSIS — R55 SYNCOPE, UNSPECIFIED SYNCOPE TYPE: ICD-10-CM

## 2023-01-05 LAB
LV EJECT FRACT  99904: 65
LV EJECT FRACT MOD 2C 99903: 72.12
LV EJECT FRACT MOD 4C 99902: 58.43
LV EJECT FRACT MOD BP 99901: 63.38

## 2023-01-05 PROCEDURE — 93306 TTE W/DOPPLER COMPLETE: CPT | Mod: 26 | Performed by: INTERNAL MEDICINE

## 2023-01-05 PROCEDURE — 93306 TTE W/DOPPLER COMPLETE: CPT

## 2023-01-17 ENCOUNTER — APPOINTMENT (OUTPATIENT)
Dept: MEDICAL GROUP | Facility: MEDICAL CENTER | Age: 78
End: 2023-01-17
Payer: MEDICARE

## 2023-01-30 NOTE — TELEPHONE ENCOUNTER
Was the patient seen in the last year in this department? Yes     Does patient have an active prescription for medications requested? No     Received Request Via: Patient    Pt called and stated that she takes Rx Pantoprazole 40mg Tab BID and not Once QD. Pt is requesting a refill w/ the sig corrected if appropriate.

## 2023-01-31 ENCOUNTER — HOSPITAL ENCOUNTER (OUTPATIENT)
Dept: RADIOLOGY | Facility: MEDICAL CENTER | Age: 78
End: 2023-01-31
Attending: NURSE PRACTITIONER
Payer: MEDICARE

## 2023-01-31 DIAGNOSIS — Z12.31 ENCOUNTER FOR SCREENING MAMMOGRAM FOR MALIGNANT NEOPLASM OF BREAST: ICD-10-CM

## 2023-01-31 DIAGNOSIS — R55 SYNCOPE, UNSPECIFIED SYNCOPE TYPE: ICD-10-CM

## 2023-01-31 PROCEDURE — 77063 BREAST TOMOSYNTHESIS BI: CPT

## 2023-01-31 PROCEDURE — 93880 EXTRACRANIAL BILAT STUDY: CPT

## 2023-02-02 RX ORDER — PANTOPRAZOLE SODIUM 40 MG/1
40 TABLET, DELAYED RELEASE ORAL 2 TIMES DAILY
Qty: 180 TABLET | OUTPATIENT
Start: 2023-02-02

## 2023-02-07 ENCOUNTER — OFFICE VISIT (OUTPATIENT)
Dept: MEDICAL GROUP | Facility: MEDICAL CENTER | Age: 78
End: 2023-02-07
Payer: MEDICARE

## 2023-02-07 VITALS
WEIGHT: 201 LBS | HEIGHT: 66 IN | BODY MASS INDEX: 32.3 KG/M2 | HEART RATE: 92 BPM | SYSTOLIC BLOOD PRESSURE: 112 MMHG | TEMPERATURE: 97.2 F | OXYGEN SATURATION: 94 % | DIASTOLIC BLOOD PRESSURE: 62 MMHG

## 2023-02-07 DIAGNOSIS — Z99.81 CHRONIC RESPIRATORY FAILURE WITH HYPOXIA, ON HOME O2 THERAPY (HCC): ICD-10-CM

## 2023-02-07 DIAGNOSIS — K21.9 GASTROESOPHAGEAL REFLUX DISEASE, UNSPECIFIED WHETHER ESOPHAGITIS PRESENT: Chronic | ICD-10-CM

## 2023-02-07 DIAGNOSIS — Z87.898 HX OF SYNCOPE: ICD-10-CM

## 2023-02-07 DIAGNOSIS — J96.11 CHRONIC RESPIRATORY FAILURE WITH HYPOXIA, ON HOME O2 THERAPY (HCC): ICD-10-CM

## 2023-02-07 DIAGNOSIS — R00.0 TACHYCARDIA: ICD-10-CM

## 2023-02-07 DIAGNOSIS — E11.9 TYPE 2 DIABETES MELLITUS WITHOUT COMPLICATION, WITHOUT LONG-TERM CURRENT USE OF INSULIN (HCC): ICD-10-CM

## 2023-02-07 PROCEDURE — 99214 OFFICE O/P EST MOD 30 MIN: CPT | Performed by: NURSE PRACTITIONER

## 2023-02-07 RX ORDER — PANTOPRAZOLE SODIUM 40 MG/1
40 TABLET, DELAYED RELEASE ORAL 2 TIMES DAILY
Qty: 180 TABLET | Refills: 3 | Status: SHIPPED | OUTPATIENT
Start: 2023-02-07 | End: 2024-03-05 | Stop reason: SDUPTHER

## 2023-02-07 ASSESSMENT — FIBROSIS 4 INDEX: FIB4 SCORE: 1.86

## 2023-02-07 NOTE — PROGRESS NOTES
CC: follow up results    Subjective:     HPI:     Judy Bland is a 77 y.o. female   here to discuss the evaluation and management of:     Follow up last OV    No further episodes of syncope.    ECHO reviewed without significant valve dysfunction.  Carotid U/s with mild stable carotid artery stenosis.  She has been mindful of staying hydrated.      Home HR were consistently in the mid 80's.   Home BP readings mostly <140<90.   118/69,125/68, 115/71, 132/70, 124/71, 141/81    ECHO 1/2023  Normal left ventricular systolic function.  The left ventricular ejection fraction is visually estimated to be 65%.  Aortic valve sclerosis without stenosis.  The right ventricle is normal in size and systolic function    Carotid Ultrasound 1/2023  IMPRESSION:     1.  There is a mild amount of atherosclerotic plaque.  Plaque is located in carotid bulbs and proximal internal carotid arteries.  Plaque characterization:  heterogeneous     2. There is no hemodynamically significant stenosis. Diameter reduction in the internal carotid arteries: less than 50%. There is no evidence of carotid occlusion.     3.  Vertebral arteries demonstrate antegrade flow.     Diabetes  Last A1c 6.7%. compliant with medications. Trying to stay active.    Chronic use of oxygen therapy  Compliant with Bipap with 2L of oxygen therapy. Followed by Pulmonology.     Gerd  Requesting refills on Pantoprazole.   She has been taking 40mg BID as QD is not helpful at controlling her symptoms.     ROS: : see above      Current Outpatient Medications:     pantoprazole (PROTONIX) 40 MG Tablet Delayed Response, Take 1 Tablet by mouth 2 times a day for 90 days., Disp: 180 Tablet, Rfl: 3    fluticasone (FLONASE) 50 MCG/ACT nasal spray, USE 1 SPRAY IN EACH NOSTRILTWICE DAILY, Disp: 48 g, Rfl: 3    montelukast (SINGULAIR) 10 MG Tab, TAKE 1 TABLET DAILY, Disp: 90 Tablet, Rfl: 1    dilTIAZem (CARDIZEM) 60 MG Tab, Take 1 Tablet by mouth 2 times a day., Disp: 180  Tablet, Rfl: 3    propranolol (INDERAL) 20 MG Tab, TAKE ONE TABLET BY MOUTH EVERY NIGHT AT BEDTIME, Disp: 90 Tablet, Rfl: 3    atorvastatin (LIPITOR) 20 MG Tab, TAKE 1 TABLET AT BEDTIME, Disp: 90 Tablet, Rfl: 3    lisinopril-hydrochlorothiazide (PRINZIDE) 20-12.5 MG per tablet, TAKE ONE TABLET BY MOUTH EVERY MORNING, Disp: 90 Tablet, Rfl: 3    metFORMIN ER (GLUCOPHAGE XR) 500 MG TABLET SR 24 HR, TAKE TWO TABLETS BY MOUTH DAILY (Patient taking differently: Take 1,500 mg by mouth every day.), Disp: 180 Tablet, Rfl: 3    budesonide-formoterol (SYMBICORT) 160-4.5 MCG/ACT Aerosol, USE 2 INHALATIONS ORALLY   TWICE DAILY WITH SPACER,   RINSE MOUTH AFTER EACH USE, Disp: 3 Each, Rfl: 3    triamcinolone acetonide (KENALOG) 0.1 % Cream, Apply  topically as needed., Disp: , Rfl:     nystatin (MYCOSTATIN) 618970 UNIT/GM Cream topical cream, Apply 1 Application topically as needed., Disp: , Rfl:     meloxicam (MOBIC) 15 MG tablet, Take 15 mg by mouth every day., Disp: , Rfl:     RESTASIS 0.05 % ophthalmic emulsion, Place 1 Drop in both eyes 2 times a day., Disp: , Rfl:     Cetirizine HCl (ZYRTEC PO), Take 1 Each by mouth as needed., Disp: , Rfl:     Allergies   Allergen Reactions    Ultram [Tramadol Hcl]      ELEVATED LIVER ENZYMES       Past Medical History:   Diagnosis Date    Acquired polycythemia 2/15/2012    Anesthesia     PONV    Arthritis     Arthropathy 2/15/2012    Benign essential hypertension 2/15/2012    Chronic hepatitis B (HCC) 2/15/2012    Cough 2/15/2012    Difficulty breathing 2/15/2012    Esophageal reflux 2/15/2012    Heart burn     Hepatitis B     Hypercholesterolemia 2/15/2012    Hyperlipidemia 2/15/2012    Hypertension     Indigestion     Jaundice     Murmur 2/15/2012    Obesity 2/15/2012    BACILIO (obstructive sleep apnea) 2/15/2012    Sinus tachycardia 2/15/2012     Past Surgical History:   Procedure Laterality Date    SEPTOPLASTY  3/12/2012    Performed by MEHDI SWAIN at SURGERY SAME DAY [facility]     TURBINOPLASTY  3/12/2012    Performed by MEHDI SWAIN at SURGERY SAME DAY [facility] ORS    SINUSOTOMIES  3/12/2012    Performed by MEHDI SWAIN at SURGERY SAME DAY [facility] ORS    TRIGGER FINGER RELEASE  11/23/2009    Performed by MEHDI ONEIL at SURGERY [facility] ORS    CARPAL TUNNEL RELEASE  2009    b/L    KNEE ARTHROPLASTY TOTAL  4/8/08    Performed by ROHAN BLACKWELL at SURGERY [facility] ORS    KNEE ARTHROPLASTY TOTAL  4/8/08    Performed by ROHAN BLACKWELL at SURGERY [facility] ORS    CATARACT EXTRACTION WITH IOL  2008    OU    ROTATOR CUFF REPAIR  2005    left    TRIGGER FINGER RELEASE  2005    left    CERVICAL CONIZATION      Loop Electrode Excision    HAND SURGERY      PRIMARY C SECTION       Family History   Problem Relation Age of Onset    Other Mother         Family hx of Coronary Arteriosclerosis    Stroke Mother     Other Father         Family hx of Coronary Arteriosclerosis    Heart Attack Father 66     Social History     Socioeconomic History    Marital status:      Spouse name: Not on file    Number of children: Not on file    Years of education: Not on file    Highest education level: Not on file   Occupational History    Not on file   Tobacco Use    Smoking status: Never    Smokeless tobacco: Never   Vaping Use    Vaping Use: Never used   Substance and Sexual Activity    Alcohol use: Yes     Alcohol/week: 0.6 oz     Types: 1 Glasses of wine per week     Comment: rarely     Drug use: No    Sexual activity: Not Currently   Other Topics Concern    Not on file   Social History Narrative    Not on file     Social Determinants of Health     Financial Resource Strain: Not on file   Food Insecurity: Not on file   Transportation Needs: Not on file   Physical Activity: Not on file   Stress: Not on file   Social Connections: Not on file   Intimate Partner Violence: Not on file   Housing Stability: Not on file       Objective:     Vitals: /62 (BP Location: Right arm, Patient Position:  "Sitting, BP Cuff Size: Adult)   Pulse 92   Temp 36.2 °C (97.2 °F) (Temporal)   Ht 1.676 m (5' 6\")   Wt 91.2 kg (201 lb)   SpO2 94%   BMI 32.44 kg/m²    General: Alert, pleasant, NAD  HEENT: Normocephalic.  Neck supple.   Respiratory: no distress, no audible wheezing, RR -WNL  Skin: Warm, dry, no rashes.  Extremities: No leg edema. No discoloration  Neurological: No tremors  Psych:  Affect/mood is normal, judgement is good, memory is intact, grooming is appropriate.    Assessment/Plan:     1. Hx of syncope  No further episodes. Echo without valvular dysfunction, minimal stable carotid plaque present on carotid u/s. Home BP stable per patient diary. Labs stable. Continue to stay hydrated. Suspect isolated event was 2/2 to dehydration and concurrent UTI.     2. Tachycardia  Resolved. Home HR consistent in 80's. Suspect previously elevated HR was 2/2 to recent acute cystitis.     3. Type 2 diabetes mellitus without complication, without long-term current use of insulin (HCC)  Chronic. A1c stable at this time.   Continue to stay active.   Continue Metformin.     4. Chronic respiratory failure with hypoxia, on home O2 therapy (HCC)  Chronic. Stable. Continue compliance with supplemental oxygen.   Followed by Pulmonology.     5. Gastroesophageal reflux disease, unspecified whether esophagitis present  Chronic. Symptoms are controlled with BID dosing of Pantoprazole.   Continue BID dosing-refills provided.   - pantoprazole (PROTONIX) 40 MG Tablet Delayed Response; Take 1 Tablet by mouth 2 times a day for 90 days.  Dispense: 180 Tablet; Refill: 3         Return in about 7 months (around 9/7/2023) for Lab results, Med Check.          Jessi BURROWS"

## 2023-02-12 PROBLEM — J96.11 CHRONIC RESPIRATORY FAILURE WITH HYPOXIA, ON HOME O2 THERAPY (HCC): Chronic | Status: ACTIVE | Noted: 2021-09-19

## 2023-02-12 PROBLEM — Z99.81 CHRONIC RESPIRATORY FAILURE WITH HYPOXIA, ON HOME O2 THERAPY (HCC): Chronic | Status: ACTIVE | Noted: 2021-09-19

## 2023-03-03 DIAGNOSIS — I10 BENIGN ESSENTIAL HYPERTENSION: ICD-10-CM

## 2023-03-03 DIAGNOSIS — E78.5 DYSLIPIDEMIA: ICD-10-CM

## 2023-03-03 DIAGNOSIS — F41.9 ANXIETY: ICD-10-CM

## 2023-03-03 DIAGNOSIS — E11.9 TYPE 2 DIABETES MELLITUS WITHOUT COMPLICATION, WITHOUT LONG-TERM CURRENT USE OF INSULIN (HCC): ICD-10-CM

## 2023-03-03 RX ORDER — ALPRAZOLAM 0.5 MG/1
0.5 TABLET ORAL
Qty: 30 TABLET | Refills: 5 | Status: SHIPPED | OUTPATIENT
Start: 2023-03-03 | End: 2023-11-02 | Stop reason: SDUPTHER

## 2023-05-12 ENCOUNTER — TELEPHONE (OUTPATIENT)
Dept: HEALTH INFORMATION MANAGEMENT | Facility: OTHER | Age: 78
End: 2023-05-12
Payer: MEDICARE

## 2023-05-18 RX ORDER — ATORVASTATIN CALCIUM 20 MG/1
TABLET, FILM COATED ORAL
Qty: 90 TABLET | Refills: 3 | Status: SHIPPED | OUTPATIENT
Start: 2023-05-18

## 2023-05-18 RX ORDER — METFORMIN HYDROCHLORIDE 500 MG/1
1500 TABLET, EXTENDED RELEASE ORAL DAILY
Qty: 270 TABLET | Refills: 3 | Status: SHIPPED | OUTPATIENT
Start: 2023-05-18

## 2023-06-20 RX ORDER — LISINOPRIL AND HYDROCHLOROTHIAZIDE 20; 12.5 MG/1; MG/1
TABLET ORAL
Qty: 90 TABLET | Refills: 3 | Status: SHIPPED | OUTPATIENT
Start: 2023-06-20

## 2023-08-29 DIAGNOSIS — F41.9 ANXIETY: ICD-10-CM

## 2023-08-30 RX ORDER — ALPRAZOLAM 0.5 MG/1
TABLET ORAL
Qty: 30 TABLET | Refills: 2 | Status: SHIPPED | OUTPATIENT
Start: 2023-08-30 | End: 2023-11-28

## 2023-09-21 ENCOUNTER — HOSPITAL ENCOUNTER (OUTPATIENT)
Dept: LAB | Facility: MEDICAL CENTER | Age: 78
End: 2023-09-21
Attending: NURSE PRACTITIONER
Payer: MEDICARE

## 2023-09-21 DIAGNOSIS — E78.5 DYSLIPIDEMIA: ICD-10-CM

## 2023-09-21 DIAGNOSIS — I10 BENIGN ESSENTIAL HYPERTENSION: ICD-10-CM

## 2023-09-21 DIAGNOSIS — E11.9 TYPE 2 DIABETES MELLITUS WITHOUT COMPLICATION, WITHOUT LONG-TERM CURRENT USE OF INSULIN (HCC): ICD-10-CM

## 2023-09-21 LAB
ALBUMIN SERPL BCP-MCNC: 4.1 G/DL (ref 3.2–4.9)
ALBUMIN/GLOB SERPL: 1.6 G/DL
ALP SERPL-CCNC: 93 U/L (ref 30–99)
ALT SERPL-CCNC: 15 U/L (ref 2–50)
ANION GAP SERPL CALC-SCNC: 13 MMOL/L (ref 7–16)
AST SERPL-CCNC: 13 U/L (ref 12–45)
BILIRUB SERPL-MCNC: 0.4 MG/DL (ref 0.1–1.5)
BUN SERPL-MCNC: 21 MG/DL (ref 8–22)
CALCIUM ALBUM COR SERPL-MCNC: 9.2 MG/DL (ref 8.5–10.5)
CALCIUM SERPL-MCNC: 9.3 MG/DL (ref 8.5–10.5)
CHLORIDE SERPL-SCNC: 106 MMOL/L (ref 96–112)
CHOLEST SERPL-MCNC: 136 MG/DL (ref 100–199)
CO2 SERPL-SCNC: 23 MMOL/L (ref 20–33)
CREAT SERPL-MCNC: 0.85 MG/DL (ref 0.5–1.4)
CREAT UR-MCNC: 114.58 MG/DL
EST. AVERAGE GLUCOSE BLD GHB EST-MCNC: 160 MG/DL
FASTING STATUS PATIENT QL REPORTED: NORMAL
GFR SERPLBLD CREATININE-BSD FMLA CKD-EPI: 70 ML/MIN/1.73 M 2
GLOBULIN SER CALC-MCNC: 2.6 G/DL (ref 1.9–3.5)
GLUCOSE SERPL-MCNC: 152 MG/DL (ref 65–99)
HBA1C MFR BLD: 7.2 % (ref 4–5.6)
HDLC SERPL-MCNC: 45 MG/DL
LDLC SERPL CALC-MCNC: 55 MG/DL
MICROALBUMIN UR-MCNC: <1.2 MG/DL
MICROALBUMIN/CREAT UR: NORMAL MG/G (ref 0–30)
POTASSIUM SERPL-SCNC: 4.1 MMOL/L (ref 3.6–5.5)
PROT SERPL-MCNC: 6.7 G/DL (ref 6–8.2)
SODIUM SERPL-SCNC: 142 MMOL/L (ref 135–145)
TRIGL SERPL-MCNC: 180 MG/DL (ref 0–149)
TSH SERPL DL<=0.005 MIU/L-ACNC: 1.68 UIU/ML (ref 0.38–5.33)

## 2023-09-21 PROCEDURE — 36415 COLL VENOUS BLD VENIPUNCTURE: CPT

## 2023-09-21 PROCEDURE — 82570 ASSAY OF URINE CREATININE: CPT

## 2023-09-21 PROCEDURE — 82043 UR ALBUMIN QUANTITATIVE: CPT

## 2023-09-21 PROCEDURE — 84443 ASSAY THYROID STIM HORMONE: CPT

## 2023-09-21 PROCEDURE — 83036 HEMOGLOBIN GLYCOSYLATED A1C: CPT | Mod: GA

## 2023-09-21 PROCEDURE — 80053 COMPREHEN METABOLIC PANEL: CPT

## 2023-09-21 PROCEDURE — 80061 LIPID PANEL: CPT

## 2023-09-26 DIAGNOSIS — I10 ESSENTIAL HYPERTENSION: ICD-10-CM

## 2023-09-27 RX ORDER — DILTIAZEM HYDROCHLORIDE 60 MG/1
60 TABLET, FILM COATED ORAL 2 TIMES DAILY
Qty: 180 TABLET | Refills: 0 | Status: SHIPPED | OUTPATIENT
Start: 2023-09-27 | End: 2024-03-04 | Stop reason: SDUPTHER

## 2023-11-02 ENCOUNTER — OFFICE VISIT (OUTPATIENT)
Dept: MEDICAL GROUP | Facility: MEDICAL CENTER | Age: 78
End: 2023-11-02
Payer: MEDICARE

## 2023-11-02 VITALS
TEMPERATURE: 97.6 F | DIASTOLIC BLOOD PRESSURE: 72 MMHG | RESPIRATION RATE: 16 BRPM | HEART RATE: 86 BPM | SYSTOLIC BLOOD PRESSURE: 122 MMHG | WEIGHT: 192 LBS | BODY MASS INDEX: 30.86 KG/M2 | HEIGHT: 66 IN | OXYGEN SATURATION: 94 %

## 2023-11-02 DIAGNOSIS — F41.9 ANXIETY: ICD-10-CM

## 2023-11-02 DIAGNOSIS — Z79.899 CHRONIC USE OF BENZODIAZEPINE FOR THERAPEUTIC PURPOSE: ICD-10-CM

## 2023-11-02 DIAGNOSIS — R79.89 ELEVATED LFTS: ICD-10-CM

## 2023-11-02 DIAGNOSIS — E11.65 TYPE 2 DIABETES MELLITUS WITH HYPERGLYCEMIA, WITHOUT LONG-TERM CURRENT USE OF INSULIN (HCC): ICD-10-CM

## 2023-11-02 DIAGNOSIS — E78.5 DYSLIPIDEMIA: ICD-10-CM

## 2023-11-02 DIAGNOSIS — E66.9 OBESITY (BMI 30-39.9): Chronic | ICD-10-CM

## 2023-11-02 DIAGNOSIS — I10 BENIGN ESSENTIAL HYPERTENSION: ICD-10-CM

## 2023-11-02 PROBLEM — Z63.6 CAREGIVER BURDEN: Status: ACTIVE | Noted: 2023-11-02

## 2023-11-02 PROCEDURE — 3074F SYST BP LT 130 MM HG: CPT | Performed by: NURSE PRACTITIONER

## 2023-11-02 PROCEDURE — 99214 OFFICE O/P EST MOD 30 MIN: CPT | Performed by: NURSE PRACTITIONER

## 2023-11-02 PROCEDURE — 3078F DIAST BP <80 MM HG: CPT | Performed by: NURSE PRACTITIONER

## 2023-11-02 RX ORDER — ALPRAZOLAM 0.5 MG/1
0.5 TABLET ORAL
Qty: 30 TABLET | Refills: 5 | Status: SHIPPED | OUTPATIENT
Start: 2023-11-02 | End: 2023-12-28 | Stop reason: SDUPTHER

## 2023-11-02 ASSESSMENT — FIBROSIS 4 INDEX: FIB4 SCORE: 0.97

## 2023-11-02 NOTE — PROGRESS NOTES
Chief Complaint   Patient presents with    Follow-Up     Labs      Subjective:     HPI:     Judy Bland is a 78 y.o. female here to discuss the followin month follow up/medication refill.     Review of labs.     Her  condition is declining-he has Alzheimer's.     ROS: : see above        Current Outpatient Medications:     ALPRAZolam (XANAX) 0.5 MG Tab, Take 1 Tablet by mouth one time as needed for Anxiety for up to 30 doses., Disp: 30 Tablet, Rfl: 5    dilTIAZem (CARDIZEM) 60 MG Tab, TAKE 1 TABLET TWICE A DAY., Disp: 180 Tablet, Rfl: 0    ALPRAZolam (XANAX) 0.5 MG Tab, TAKE 1 TABLET BY MOUTH EVERY DAY AS NEEDED FOR ANXIETY FOR 30 DAYS, Disp: 30 Tablet, Rfl: 2    lisinopril-hydrochlorothiazide (PRINZIDE) 20-12.5 MG per tablet, TAKE ONE TABLET BY MOUTH EVERY MORNING, Disp: 90 Tablet, Rfl: 3    metFORMIN ER (GLUCOPHAGE XR) 500 MG TABLET SR 24 HR, Take 3 Tablets by mouth every day., Disp: 270 Tablet, Rfl: 3    atorvastatin (LIPITOR) 20 MG Tab, TAKE 1 TABLET AT BEDTIME, Disp: 90 Tablet, Rfl: 3    fluticasone (FLONASE) 50 MCG/ACT nasal spray, USE 1 SPRAY IN EACH NOSTRILTWICE DAILY, Disp: 48 g, Rfl: 3    montelukast (SINGULAIR) 10 MG Tab, TAKE 1 TABLET DAILY, Disp: 90 Tablet, Rfl: 1    propranolol (INDERAL) 20 MG Tab, TAKE ONE TABLET BY MOUTH EVERY NIGHT AT BEDTIME, Disp: 90 Tablet, Rfl: 3    budesonide-formoterol (SYMBICORT) 160-4.5 MCG/ACT Aerosol, USE 2 INHALATIONS ORALLY   TWICE DAILY WITH SPACER,   RINSE MOUTH AFTER EACH USE, Disp: 3 Each, Rfl: 3    triamcinolone acetonide (KENALOG) 0.1 % Cream, Apply  topically as needed., Disp: , Rfl:     nystatin (MYCOSTATIN) 679520 UNIT/GM Cream topical cream, Apply 1 Application topically as needed., Disp: , Rfl:     meloxicam (MOBIC) 15 MG tablet, Take 15 mg by mouth every day., Disp: , Rfl:     RESTASIS 0.05 % ophthalmic emulsion, Place 1 Drop in both eyes 2 times a day., Disp: , Rfl:     Cetirizine HCl (ZYRTEC PO), Take 1 Each by mouth as needed., Disp:  ", Rfl:     Allergies   Allergen Reactions    Ultram [Tramadol Hcl]      ELEVATED LIVER ENZYMES       Objective:     Vitals: /72   Pulse 86   Temp 36.4 °C (97.6 °F)   Resp 16   Ht 1.676 m (5' 6\")   Wt 87.1 kg (192 lb)   SpO2 94%   BMI 30.99 kg/m²    General: Alert, pleasant, NAD  HEENT: Normocephalic.  Neck supple.   Respiratory: no distress, no audible wheezing, RR -WNL  Skin: Warm, dry, no rashes.  Extremities: No leg edema. No discoloration  Neurological: No tremors  Psych:  Affect/mood is normal, judgement is good, memory is intact, grooming is appropriate.    Assessment/Plan:      1. Type 2 diabetes mellitus with hyperglycemia, without long-term current use of insulin (HCC)  Chronic.  Stable.  A1c 7.2%.  Continue metformin and lifestyle/dietary modifications.  Monofilament completed.  Requesting records from recent retinal exam.  - Diabetic Monofilament LE Exam    2. Benign essential hypertension  Chronic, stable. Continue Lisinopril/HCTZ and Diltiazem.     3. Dyslipidemia  Chronic, stable with statin therapy.  LDL below 70. NL LFT's    4. Elevated LFTs  Most recent blood work with normal LFTs.  -History of hep B.    5. Anxiety  Chronic.  Xanax has been helpful for this.  She does care for her  who does have Alzheimer's and it can be very difficult at times as he does have very short-term memory which per wife is progressing.  - ALPRAZolam (XANAX) 0.5 MG Tab; Take 1 Tablet by mouth one time as needed for Anxiety for up to 30 doses.  Dispense: 30 Tablet; Refill: 5    6. Chronic use of benzodiazepine for therapeutic purpose  Chronic.  Taking Xanax for this.    7. Obesity (BMI 30-39.9)  - Patient identified as having weight management issue.  Appropriate orders and counseling given.      Return in about 6 months (around 5/2/2024).    {I have placed the above orders and discussed them with an approved delegating provider. The MA is performing the below orders under the direction of " Michael BURROWS

## 2023-11-02 NOTE — LETTER
[facility]  JUVENTINO HoRURIEL.  [address] [facility] [address]  [facility] [address]  Fax: [phone]   Authorization for Release/Disclosure of   Protected Health Information   Name: ROSHAN BLAND : 1945 SSN: [id number]   Address: [address] [place] [address] Phone:    [phone] (home)    I authorize the entity listed below to release/disclose the PHI below to:   [facility]/CARLINE Ho.RJAYSON and MARKOS Ho   Provider or Entity Name:  [facility]    Address   City, State, Zip   Phone:      Fax:     Reason for request: continuity of care   Information to be released:    [  ] LAST COLONOSCOPY,  including any PATH REPORT and follow-up   [  ] LAST DEXA  [  ] LAST MAMMOGRAM  [  ] LAST PAP  [  ] LAST LABS [ x ] RETINA EXAM REPORT  [  ] IMMUNIZATION RECORDS  [  ] Release all info      [  ] Check here and initial the line next to each item to release ALL health information INCLUDING  _____ Care and treatment for drug and / or alcohol abuse  _____ HIV testing, infection status, or AIDS  _____ Genetic Testing    DATES OF SERVICE OR TIME PERIOD TO BE DISCLOSED: _____________  I understand and acknowledge that:  * This Authorization may be revoked at any time by you in writing, except if your health information has already been used or disclosed.  * Your health information that will be used or disclosed as a result of you signing this authorization could be re-disclosed by the recipient. If this occurs, your re-disclosed health information may no longer be protected by State or Federal laws.  * You may refuse to sign this Authorization. Your refusal will not affect your ability to obtain treatment.  * This Authorization becomes effective upon signing and will  on (date) __________.      If no date is indicated, this Authorization will  one (1) year from the signature date.    Name: Roshan Bland  Signature: Date:    11/2/2023     PLEASE FAX REQUESTED RECORDS BACK TO: [phone]

## 2023-11-10 DIAGNOSIS — R06.02 SHORTNESS OF BREATH: ICD-10-CM

## 2023-11-10 RX ORDER — MONTELUKAST SODIUM 10 MG/1
TABLET ORAL
Qty: 90 TABLET | Refills: 1 | Status: SHIPPED | OUTPATIENT
Start: 2023-11-10

## 2023-11-10 RX ORDER — PROPRANOLOL HYDROCHLORIDE 20 MG/1
TABLET ORAL
Qty: 90 TABLET | Refills: 3 | Status: SHIPPED | OUTPATIENT
Start: 2023-11-10

## 2023-12-13 ENCOUNTER — APPOINTMENT (RX ONLY)
Dept: URBAN - METROPOLITAN AREA CLINIC 22 | Facility: CLINIC | Age: 78
Setting detail: DERMATOLOGY
End: 2023-12-13

## 2023-12-13 DIAGNOSIS — L57.0 ACTINIC KERATOSIS: ICD-10-CM

## 2023-12-13 DIAGNOSIS — L82.1 OTHER SEBORRHEIC KERATOSIS: ICD-10-CM

## 2023-12-13 DIAGNOSIS — Z71.89 OTHER SPECIFIED COUNSELING: ICD-10-CM

## 2023-12-13 DIAGNOSIS — D18.0 HEMANGIOMA: ICD-10-CM

## 2023-12-13 DIAGNOSIS — L82.0 INFLAMED SEBORRHEIC KERATOSIS: ICD-10-CM

## 2023-12-13 DIAGNOSIS — D22 MELANOCYTIC NEVI: ICD-10-CM

## 2023-12-13 DIAGNOSIS — L30.4 ERYTHEMA INTERTRIGO: ICD-10-CM | Status: INADEQUATELY CONTROLLED

## 2023-12-13 DIAGNOSIS — L81.4 OTHER MELANIN HYPERPIGMENTATION: ICD-10-CM

## 2023-12-13 PROBLEM — D18.01 HEMANGIOMA OF SKIN AND SUBCUTANEOUS TISSUE: Status: ACTIVE | Noted: 2023-12-13

## 2023-12-13 PROBLEM — D22.5 MELANOCYTIC NEVI OF TRUNK: Status: ACTIVE | Noted: 2023-12-13

## 2023-12-13 PROCEDURE — ? SUNSCREEN RECOMMENDATIONS

## 2023-12-13 PROCEDURE — 17000 DESTRUCT PREMALG LESION: CPT | Mod: 59

## 2023-12-13 PROCEDURE — 17003 DESTRUCT PREMALG LES 2-14: CPT | Mod: 59

## 2023-12-13 PROCEDURE — ? PRESCRIPTION

## 2023-12-13 PROCEDURE — ? COUNSELING

## 2023-12-13 PROCEDURE — 17111 DESTRUCTION B9 LESIONS 15/>: CPT

## 2023-12-13 PROCEDURE — ? LIQUID NITROGEN

## 2023-12-13 PROCEDURE — ? PRESCRIPTION MEDICATION MANAGEMENT

## 2023-12-13 PROCEDURE — ? ADDITIONAL NOTES

## 2023-12-13 PROCEDURE — 99214 OFFICE O/P EST MOD 30 MIN: CPT | Mod: 25

## 2023-12-13 RX ORDER — NYSTATIN 100000 [USP'U]/G
POWDER TOPICAL TID
Qty: 60 | Refills: 2 | Status: ERX | COMMUNITY
Start: 2023-12-13

## 2023-12-13 RX ADMIN — NYSTATIN: 100000 POWDER TOPICAL at 00:00

## 2023-12-13 ASSESSMENT — LOCATION SIMPLE DESCRIPTION DERM
LOCATION SIMPLE: LEFT UPPER BACK
LOCATION SIMPLE: LEFT HAND
LOCATION SIMPLE: RIGHT LOWER BACK
LOCATION SIMPLE: NOSE
LOCATION SIMPLE: RIGHT FOREARM
LOCATION SIMPLE: RIGHT SHOULDER
LOCATION SIMPLE: RIGHT ANTERIOR NECK
LOCATION SIMPLE: LEFT ANTERIOR NECK
LOCATION SIMPLE: RIGHT FOREHEAD
LOCATION SIMPLE: NECK
LOCATION SIMPLE: CHEST
LOCATION SIMPLE: LEFT PRETIBIAL REGION
LOCATION SIMPLE: RIGHT CLAVICULAR SKIN
LOCATION SIMPLE: ABDOMEN
LOCATION SIMPLE: GROIN
LOCATION SIMPLE: RIGHT HAND

## 2023-12-13 ASSESSMENT — LOCATION DETAILED DESCRIPTION DERM
LOCATION DETAILED: RIGHT DISTAL DORSAL FOREARM
LOCATION DETAILED: LEFT SUPRAPUBIC SKIN
LOCATION DETAILED: RIGHT ULNAR DORSAL HAND
LOCATION DETAILED: RIGHT LATERAL SUPERIOR CHEST
LOCATION DETAILED: UPPER STERNUM
LOCATION DETAILED: RIGHT PROXIMAL ULNAR DORSAL FOREARM
LOCATION DETAILED: NASAL DORSUM
LOCATION DETAILED: LEFT INFERIOR UPPER BACK
LOCATION DETAILED: RIGHT MEDIAL SUPERIOR CHEST
LOCATION DETAILED: RIGHT INFERIOR LATERAL FOREHEAD
LOCATION DETAILED: RIGHT SUPERIOR MEDIAL MIDBACK
LOCATION DETAILED: RIGHT CLAVICULAR SKIN
LOCATION DETAILED: LEFT INFERIOR LATERAL UPPER BACK
LOCATION DETAILED: RIGHT FOREHEAD
LOCATION DETAILED: RIGHT RADIAL DORSAL HAND
LOCATION DETAILED: LEFT LATERAL SUPERIOR CHEST
LOCATION DETAILED: LEFT CENTRAL LATERAL NECK
LOCATION DETAILED: RIGHT LATERAL FOREHEAD
LOCATION DETAILED: RIGHT CLAVICULAR NECK
LOCATION DETAILED: LEFT MEDIAL SUPERIOR CHEST
LOCATION DETAILED: NASAL SUPRATIP
LOCATION DETAILED: STERNAL NOTCH
LOCATION DETAILED: LEFT SUPERIOR MEDIAL UPPER BACK
LOCATION DETAILED: LEFT CLAVICULAR NECK
LOCATION DETAILED: LEFT RADIAL DORSAL HAND
LOCATION DETAILED: RIGHT ANTERIOR SHOULDER
LOCATION DETAILED: LEFT DISTAL PRETIBIAL REGION
LOCATION DETAILED: EPIGASTRIC SKIN

## 2023-12-13 ASSESSMENT — LOCATION ZONE DERM
LOCATION ZONE: FACE
LOCATION ZONE: ARM
LOCATION ZONE: TRUNK
LOCATION ZONE: HAND
LOCATION ZONE: LEG
LOCATION ZONE: NECK
LOCATION ZONE: NOSE

## 2023-12-13 ASSESSMENT — SEVERITY ASSESSMENT: SEVERITY: MILD TO MODERATE

## 2023-12-13 NOTE — PROCEDURE: ADDITIONAL NOTES
Additional Notes: A few treated with light LN2
Detail Level: Simple
Render Risk Assessment In Note?: no

## 2023-12-13 NOTE — PROCEDURE: LIQUID NITROGEN
Show Aperture Variable?: Yes
Consent: The patient's consent was obtained including but not limited to risks of crusting, scabbing, blistering, scarring, darker or lighter pigmentary change, recurrence, incomplete removal and infection.
Detail Level: Detailed
Render Post-Care Instructions In Note?: no
Number Of Freeze-Thaw Cycles: 2 freeze-thaw cycles
Duration Of Freeze Thaw-Cycle (Seconds): 3
Post-Care Instructions: I reviewed with the patient in detail post-care instructions. Patient is to wear sunprotection, and avoid picking at any of the treated lesions. Pt may apply Vaseline to crusted or scabbing areas.
Number Of Freeze-Thaw Cycles: 3 freeze-thaw cycles
Application Tool (Optional): Liquid Nitrogen Sprayer
Medical Necessity Clause: This procedure was medically necessary because the lesions that were treated were:
Spray Paint Text: The liquid nitrogen was applied to the skin utilizing a spray paint frosting technique.
Medical Necessity Information: It is in your best interest to select a reason for this procedure from the list below. All of these items fulfill various CMS LCD requirements except the new and changing color options.

## 2023-12-13 NOTE — PROCEDURE: SUNSCREEN RECOMMENDATIONS

## 2023-12-28 DIAGNOSIS — F41.9 ANXIETY: ICD-10-CM

## 2023-12-28 RX ORDER — ALPRAZOLAM 0.5 MG/1
0.5 TABLET ORAL
Qty: 30 TABLET | Refills: 5 | Status: SHIPPED | OUTPATIENT
Start: 2023-12-28 | End: 2024-02-29 | Stop reason: SDUPTHER

## 2023-12-29 RX ORDER — ALPRAZOLAM 0.5 MG/1
0.5 TABLET ORAL
Qty: 30 TABLET | Refills: 5 | OUTPATIENT
Start: 2023-12-29 | End: 2024-01-28

## 2024-02-29 DIAGNOSIS — F41.9 ANXIETY: ICD-10-CM

## 2024-03-04 DIAGNOSIS — I10 ESSENTIAL HYPERTENSION: ICD-10-CM

## 2024-03-04 NOTE — TELEPHONE ENCOUNTER
Is the patient due for a refill? Yes    Was the patient seen the past year? No    Date of last office visit: 10/11/22    Does the patient have an upcoming appointment?  No    Provider to refill:SW    Does the patients insurance require a 100 day supply?  No

## 2024-03-05 DIAGNOSIS — K21.9 GASTROESOPHAGEAL REFLUX DISEASE, UNSPECIFIED WHETHER ESOPHAGITIS PRESENT: Chronic | ICD-10-CM

## 2024-03-05 RX ORDER — DILTIAZEM HYDROCHLORIDE 60 MG/1
60 TABLET, FILM COATED ORAL 2 TIMES DAILY
Qty: 60 TABLET | Refills: 0 | Status: SHIPPED | OUTPATIENT
Start: 2024-03-05

## 2024-03-06 RX ORDER — ALPRAZOLAM 0.5 MG/1
0.5 TABLET ORAL
Qty: 30 TABLET | Refills: 5 | Status: SHIPPED | OUTPATIENT
Start: 2024-03-06 | End: 2024-04-05

## 2024-03-06 RX ORDER — PANTOPRAZOLE SODIUM 40 MG/1
40 TABLET, DELAYED RELEASE ORAL 2 TIMES DAILY
Qty: 180 TABLET | Refills: 3 | Status: SHIPPED | OUTPATIENT
Start: 2024-03-06

## 2024-04-12 ENCOUNTER — TELEPHONE (OUTPATIENT)
Dept: MEDICAL GROUP | Facility: MEDICAL CENTER | Age: 79
End: 2024-04-12
Payer: MEDICARE

## 2024-04-16 NOTE — TELEPHONE ENCOUNTER
BP at goal, con't current meds   Pt called with information. No answer, left VM for call back. Also,  is recommending for Pt a referral to Health Improvement Program due to weight.  Lexy VILLEGAS RN     CONCLUSION:  1. Limited exercise capacity but adequate heart rate response.  2. Negative exercise treadmill stress test for ischemia.  3. No arrhythmias.  4. Hypertensive response to exercise.    Electronically Signed By:  Lit Irby M.D.

## 2024-05-02 ENCOUNTER — OFFICE VISIT (OUTPATIENT)
Dept: MEDICAL GROUP | Facility: MEDICAL CENTER | Age: 79
End: 2024-05-02
Payer: MEDICARE

## 2024-05-02 VITALS
OXYGEN SATURATION: 96 % | HEIGHT: 66 IN | TEMPERATURE: 97.5 F | WEIGHT: 198 LBS | BODY MASS INDEX: 31.82 KG/M2 | DIASTOLIC BLOOD PRESSURE: 72 MMHG | RESPIRATION RATE: 16 BRPM | SYSTOLIC BLOOD PRESSURE: 122 MMHG | HEART RATE: 68 BPM

## 2024-05-02 DIAGNOSIS — I10 BENIGN ESSENTIAL HYPERTENSION: ICD-10-CM

## 2024-05-02 DIAGNOSIS — J45.909 UNCOMPLICATED ASTHMA, UNSPECIFIED ASTHMA SEVERITY, UNSPECIFIED WHETHER PERSISTENT: ICD-10-CM

## 2024-05-02 DIAGNOSIS — E11.65 TYPE 2 DIABETES MELLITUS WITH HYPERGLYCEMIA, WITHOUT LONG-TERM CURRENT USE OF INSULIN (HCC): ICD-10-CM

## 2024-05-02 DIAGNOSIS — F41.9 ANXIETY: ICD-10-CM

## 2024-05-02 DIAGNOSIS — Z79.899 CHRONIC USE OF BENZODIAZEPINE FOR THERAPEUTIC PURPOSE: ICD-10-CM

## 2024-05-02 DIAGNOSIS — E78.5 DYSLIPIDEMIA: ICD-10-CM

## 2024-05-02 LAB
HBA1C MFR BLD: 7.8 % (ref ?–5.8)
POCT INT CON NEG: NEGATIVE
POCT INT CON POS: POSITIVE

## 2024-05-02 PROCEDURE — 3078F DIAST BP <80 MM HG: CPT | Performed by: NURSE PRACTITIONER

## 2024-05-02 PROCEDURE — 99214 OFFICE O/P EST MOD 30 MIN: CPT | Performed by: NURSE PRACTITIONER

## 2024-05-02 PROCEDURE — 83036 HEMOGLOBIN GLYCOSYLATED A1C: CPT | Performed by: NURSE PRACTITIONER

## 2024-05-02 PROCEDURE — 3074F SYST BP LT 130 MM HG: CPT | Performed by: NURSE PRACTITIONER

## 2024-05-02 RX ORDER — MONTELUKAST SODIUM 10 MG/1
10 TABLET ORAL DAILY
Qty: 90 TABLET | Refills: 3 | Status: SHIPPED | OUTPATIENT
Start: 2024-05-02

## 2024-05-02 RX ORDER — ALPRAZOLAM 0.5 MG/1
0.5 TABLET ORAL
Qty: 30 TABLET | Refills: 5 | Status: SHIPPED | OUTPATIENT
Start: 2024-05-02 | End: 2024-06-01

## 2024-05-02 RX ORDER — METFORMIN HYDROCHLORIDE 500 MG/1
1500 TABLET, EXTENDED RELEASE ORAL DAILY
Qty: 270 TABLET | Refills: 3 | Status: SHIPPED | OUTPATIENT
Start: 2024-05-02

## 2024-05-02 RX ORDER — ALBUTEROL SULFATE 90 UG/1
2 AEROSOL, METERED RESPIRATORY (INHALATION) EVERY 4 HOURS PRN
Qty: 1 EACH | Refills: 11 | Status: SHIPPED | OUTPATIENT
Start: 2024-05-02

## 2024-05-02 RX ORDER — ATORVASTATIN CALCIUM 20 MG/1
20 TABLET, FILM COATED ORAL
Qty: 90 TABLET | Refills: 3 | Status: SHIPPED | OUTPATIENT
Start: 2024-05-02

## 2024-05-02 RX ORDER — BUDESONIDE AND FORMOTEROL FUMARATE DIHYDRATE 160; 4.5 UG/1; UG/1
AEROSOL RESPIRATORY (INHALATION)
Qty: 3 EACH | Refills: 3 | Status: SHIPPED | OUTPATIENT
Start: 2024-05-02

## 2024-05-02 ASSESSMENT — PATIENT HEALTH QUESTIONNAIRE - PHQ9
CLINICAL INTERPRETATION OF PHQ2 SCORE: 2
SUM OF ALL RESPONSES TO PHQ QUESTIONS 1-9: 5
5. POOR APPETITE OR OVEREATING: 1 - SEVERAL DAYS

## 2024-05-02 ASSESSMENT — FIBROSIS 4 INDEX: FIB4 SCORE: 0.97

## 2024-05-02 NOTE — PROGRESS NOTES
Subjective:     HPI:     Judy Bland is a 78 y.o. female presents to discuss:   Chief Complaint   Patient presents with    Diabetes Follow-up     6 month follow up Diabetes and medication refill.  Last fill per  of Xanax 4/29/2024  She tells me she suspects her  had a stroke.  He has Alzheimer's which he has been declining over the years.    Lab Results   Component Value Date/Time    HBA1C 7.8 (A) 05/02/2024 1028    HBA1C 7.2 (H) 09/21/2023 0834    HBA1C 6.7 (H) 09/16/2022 0919    HBA1C 7.4 (A) 03/18/2022 1116        ROS: : see above      Current Outpatient Medications:     montelukast (SINGULAIR) 10 MG Tab, Take 1 Tablet by mouth every day., Disp: 90 Tablet, Rfl: 3    budesonide-formoterol (SYMBICORT) 160-4.5 MCG/ACT Aerosol, USE 2 INHALATIONS ORALLY   TWICE DAILY WITH SPACER,   RINSE MOUTH AFTER EACH USE., Disp: 3 Each, Rfl: 3    albuterol (VENTOLIN HFA) 108 (90 Base) MCG/ACT Aero Soln inhalation aerosol, Inhale 2 Puffs every four hours as needed for Shortness of Breath., Disp: 1 Each, Rfl: 11    atorvastatin (LIPITOR) 20 MG Tab, Take 1 Tablet by mouth at bedtime., Disp: 90 Tablet, Rfl: 3    metFORMIN ER (GLUCOPHAGE XR) 500 MG TABLET SR 24 HR, Take 3 Tablets by mouth every day., Disp: 270 Tablet, Rfl: 3    ALPRAZolam (XANAX) 0.5 MG Tab, Take 1 Tablet by mouth one time as needed for Anxiety for up to 30 days., Disp: 30 Tablet, Rfl: 5    pantoprazole (PROTONIX) 40 MG Tablet Delayed Response, Take 1 Tablet by mouth 2 times a day., Disp: 180 Tablet, Rfl: 3    propranolol (INDERAL) 20 MG Tab, TAKE ONE TABLET BY MOUTH EVERY NIGHT AT BEDTIME, Disp: 90 Tablet, Rfl: 3    lisinopril-hydrochlorothiazide (PRINZIDE) 20-12.5 MG per tablet, TAKE ONE TABLET BY MOUTH EVERY MORNING, Disp: 90 Tablet, Rfl: 3    dilTIAZem (CARDIZEM) 60 MG Tab, Take 1 Tablet by mouth 2 times a day., Disp: 60 Tablet, Rfl: 0    meloxicam (MOBIC) 15 MG tablet, Take 1 Tablet by mouth every day., Disp: 30 Tablet, Rfl: 0    fluticasone  "(FLONASE) 50 MCG/ACT nasal spray, USE 1 SPRAY IN EACH NOSTRILTWICE DAILY, Disp: 48 g, Rfl: 3    triamcinolone acetonide (KENALOG) 0.1 % Cream, Apply  topically as needed., Disp: , Rfl:     nystatin (MYCOSTATIN) 880121 UNIT/GM Cream topical cream, Apply 1 Application topically as needed., Disp: , Rfl:     meloxicam (MOBIC) 15 MG tablet, Take 15 mg by mouth every day., Disp: , Rfl:     RESTASIS 0.05 % ophthalmic emulsion, Place 1 Drop in both eyes 2 times a day., Disp: , Rfl:     Cetirizine HCl (ZYRTEC PO), Take 1 Each by mouth as needed., Disp: , Rfl:     Allergies   Allergen Reactions    Ultram [Tramadol Hcl]      ELEVATED LIVER ENZYMES       Objective:     Vitals: /72   Pulse 68   Temp 36.4 °C (97.5 °F)   Resp 16   Ht 1.676 m (5' 6\")   Wt 89.8 kg (198 lb)   SpO2 96%   BMI 31.96 kg/m²      General: Alert, pleasant, NAD  HEENT: Normocephalic.  Neck supple.   Respiratory: no distress, no audible wheezing, RR -WNL  Skin: Warm, dry, no rashes.  Extremities: No leg edema. No discoloration  Neurological: No tremors  Psych:  Affect/mood is normal, judgement is good, memory is intact, grooming is appropriate.    Assessment/Plan:      1. Type 2 diabetes mellitus with hyperglycemia, without long-term current use of insulin (HCC)  Chronic.  A1c has increased just slightly now at 7.8%.  Patient states she is has been indulging.  She will continue with metformin, lifestyle modifications.  Update labs prior to next office visit.  - POCT Hemoglobin A1C  - metFORMIN ER (GLUCOPHAGE XR) 500 MG TABLET SR 24 HR; Take 3 Tablets by mouth every day.  Dispense: 270 Tablet; Refill: 3  - Comp Metabolic Panel; Future  - HEMOGLOBIN A1C; Future  - MICROALBUMIN CREAT RATIO URINE; Future    2. Uncomplicated asthma, unspecified asthma severity, unspecified whether persistent  Chronic, stable.  Needs refills of her montelukast, Symbicort as well as her rescue inhaler.  Followed by pulmonology.  - montelukast (SINGULAIR) 10 MG Tab; Take " 1 Tablet by mouth every day.  Dispense: 90 Tablet; Refill: 3  - budesonide-formoterol (SYMBICORT) 160-4.5 MCG/ACT Aerosol; USE 2 INHALATIONS ORALLY   TWICE DAILY WITH SPACER,   RINSE MOUTH AFTER EACH USE.  Dispense: 3 Each; Refill: 3  - albuterol (VENTOLIN HFA) 108 (90 Base) MCG/ACT Aero Soln inhalation aerosol; Inhale 2 Puffs every four hours as needed for Shortness of Breath.  Dispense: 1 Each; Refill: 11    3. Dyslipidemia  Chronic.  Lipid panel has previously been stable with statin therapy.  LDL < 70.  Last LFTs within normal.    -Update labs prior to next office visit.  - atorvastatin (LIPITOR) 20 MG Tab; Take 1 Tablet by mouth at bedtime.  Dispense: 90 Tablet; Refill: 3  - Lipid Profile; Future  - Comp Metabolic Panel; Future  - TSH; Future    4. Anxiety  Chronic, ongoing.  Patient does care for her  who has Alzheimer's and most recently it is suspected that he did have a stroke which is left him with some expressive aphasia.  She is his primary caregiver.  She does have difficult time with sleeping as well as anxiety and is Xanax has been helpful for this.  Denies any adverse side effects.   checked.  No inconsistencies.  Have provided refills and she will follow-up in 6 months for ongoing refills.  - ALPRAZolam (XANAX) 0.5 MG Tab; Take 1 Tablet by mouth one time as needed for Anxiety for up to 30 days.  Dispense: 30 Tablet; Refill: 5    5. Chronic use of benzodiazepine for therapeutic purpose  Secondary to ongoing Xanax use.    6. Benign essential hypertension  Chronic, stable.  She will continue lisinopril/HCTZ on diltiazem.  Update blood work prior to next office visit.    Return in about 6 months (around 11/2/2024).    {I have placed the above orders and discussed them with an approved delegating provider. The MA is performing the below orders under the direction of Dr. Michael BURROWS

## 2024-06-13 DIAGNOSIS — I10 ESSENTIAL HYPERTENSION: ICD-10-CM

## 2024-06-13 RX ORDER — DILTIAZEM HYDROCHLORIDE 60 MG/1
60 TABLET, FILM COATED ORAL 2 TIMES DAILY
Qty: 90 TABLET | Refills: 0 | OUTPATIENT
Start: 2024-06-13

## 2024-06-17 RX ORDER — LISINOPRIL AND HYDROCHLOROTHIAZIDE 20; 12.5 MG/1; MG/1
TABLET ORAL
Qty: 90 TABLET | Refills: 3 | Status: SHIPPED | OUTPATIENT
Start: 2024-06-17

## 2024-09-19 ENCOUNTER — APPOINTMENT (RX ONLY)
Dept: URBAN - METROPOLITAN AREA CLINIC 22 | Facility: CLINIC | Age: 79
Setting detail: DERMATOLOGY
End: 2024-09-19

## 2024-09-19 ENCOUNTER — RX ONLY (OUTPATIENT)
Age: 79
Setting detail: RX ONLY
End: 2024-09-19

## 2024-09-19 DIAGNOSIS — L29.8 OTHER PRURITUS: ICD-10-CM

## 2024-09-19 DIAGNOSIS — L29.89 OTHER PRURITUS: ICD-10-CM

## 2024-09-19 DIAGNOSIS — L259 CONTACT DERMATITIS AND OTHER ECZEMA, UNSPECIFIED CAUSE: ICD-10-CM | Status: INADEQUATELY CONTROLLED

## 2024-09-19 PROBLEM — L30.9 DERMATITIS, UNSPECIFIED: Status: ACTIVE | Noted: 2024-09-19

## 2024-09-19 PROCEDURE — ? COUNSELING

## 2024-09-19 PROCEDURE — 99214 OFFICE O/P EST MOD 30 MIN: CPT

## 2024-09-19 PROCEDURE — ? PRESCRIPTION

## 2024-09-19 PROCEDURE — ? ADDITIONAL NOTES

## 2024-09-19 PROCEDURE — ? TREATMENT REGIMEN

## 2024-09-19 RX ORDER — DOXEPIN HYDROCHLORIDE 25 MG/1
CAPSULE ORAL
Qty: 180 | Refills: 1 | Status: ERX

## 2024-09-19 RX ORDER — TRIAMCINOLONE ACETONIDE 1 MG/G
CREAM TOPICAL BID
Qty: 30 | Refills: 1 | Status: CANCELLED | COMMUNITY
Start: 2024-09-19

## 2024-09-19 RX ORDER — TRIAMCINOLONE ACETONIDE 1 MG/G
CREAM TOPICAL BID
Qty: 80 | Refills: 1 | Status: ERX

## 2024-09-19 RX ADMIN — TRIAMCINOLONE ACETONIDE: 1 CREAM TOPICAL at 00:00

## 2024-09-19 ASSESSMENT — LOCATION SIMPLE DESCRIPTION DERM
LOCATION SIMPLE: RIGHT AXILLARY VAULT
LOCATION SIMPLE: LEFT POSTERIOR UPPER ARM
LOCATION SIMPLE: LEFT ANTERIOR NECK
LOCATION SIMPLE: RIGHT LOWER BACK
LOCATION SIMPLE: RIGHT THIGH
LOCATION SIMPLE: LEFT UPPER BACK
LOCATION SIMPLE: RIGHT POSTERIOR UPPER ARM
LOCATION SIMPLE: CHEST
LOCATION SIMPLE: RIGHT UPPER ARM

## 2024-09-19 ASSESSMENT — LOCATION DETAILED DESCRIPTION DERM
LOCATION DETAILED: LEFT DISTAL POSTERIOR UPPER ARM
LOCATION DETAILED: RIGHT AXILLARY VAULT
LOCATION DETAILED: RIGHT SUPERIOR LATERAL LOWER BACK
LOCATION DETAILED: LEFT SUPERIOR ANTERIOR NECK
LOCATION DETAILED: RIGHT ANTERIOR MEDIAL PROXIMAL UPPER ARM
LOCATION DETAILED: RIGHT ANTERIOR MEDIAL DISTAL THIGH
LOCATION DETAILED: RIGHT DISTAL POSTERIOR UPPER ARM
LOCATION DETAILED: RIGHT LATERAL SUPERIOR CHEST
LOCATION DETAILED: LEFT MID-UPPER BACK

## 2024-09-19 ASSESSMENT — LOCATION ZONE DERM
LOCATION ZONE: NECK
LOCATION ZONE: TRUNK
LOCATION ZONE: ARM
LOCATION ZONE: LEG
LOCATION ZONE: AXILLAE

## 2024-09-19 NOTE — PROCEDURE: ADDITIONAL NOTES
Additional Notes: Pt will RTC if flare persists or worsens.
Detail Level: Simple
Render Risk Assessment In Note?: no

## 2024-10-07 ENCOUNTER — APPOINTMENT (RX ONLY)
Dept: URBAN - METROPOLITAN AREA CLINIC 22 | Facility: CLINIC | Age: 79
Setting detail: DERMATOLOGY
End: 2024-10-07

## 2024-10-07 DIAGNOSIS — L29.89 OTHER PRURITUS: ICD-10-CM

## 2024-10-07 DIAGNOSIS — L259 CONTACT DERMATITIS AND OTHER ECZEMA, UNSPECIFIED CAUSE: ICD-10-CM

## 2024-10-07 PROBLEM — L30.9 DERMATITIS, UNSPECIFIED: Status: ACTIVE | Noted: 2024-10-07

## 2024-10-07 PROCEDURE — ? COUNSELING

## 2024-10-07 PROCEDURE — ? TREATMENT REGIMEN

## 2024-10-07 PROCEDURE — 99214 OFFICE O/P EST MOD 30 MIN: CPT

## 2024-10-07 PROCEDURE — ? PRESCRIPTION

## 2024-10-07 RX ORDER — PREDNISONE 10 MG/1
TABLET ORAL AS DIRECTED
Qty: 40 | Refills: 0 | Status: ERX | COMMUNITY
Start: 2024-10-07

## 2024-10-07 RX ORDER — CLOBETASOL PROPIONATE 0.5 MG/G
OINTMENT TOPICAL BID
Qty: 60 | Refills: 1 | Status: ERX | COMMUNITY
Start: 2024-10-07

## 2024-10-07 RX ADMIN — CLOBETASOL PROPIONATE: 0.5 OINTMENT TOPICAL at 00:00

## 2024-10-07 RX ADMIN — PREDNISONE: 10 TABLET ORAL at 00:00

## 2024-10-07 ASSESSMENT — LOCATION DETAILED DESCRIPTION DERM
LOCATION DETAILED: RIGHT ANTERIOR MEDIAL PROXIMAL UPPER ARM
LOCATION DETAILED: LEFT MID-UPPER BACK
LOCATION DETAILED: RIGHT ANTERIOR PROXIMAL THIGH
LOCATION DETAILED: LEFT ANTERIOR PROXIMAL THIGH
LOCATION DETAILED: LEFT DISTAL POSTERIOR UPPER ARM
LOCATION DETAILED: RIGHT DISTAL POSTERIOR UPPER ARM
LOCATION DETAILED: RIGHT ANTERIOR MEDIAL DISTAL THIGH
LOCATION DETAILED: LEFT SUPERIOR ANTERIOR NECK
LOCATION DETAILED: RIGHT LATERAL SUPERIOR CHEST
LOCATION DETAILED: RIGHT SUPERIOR LATERAL LOWER BACK

## 2024-10-07 ASSESSMENT — LOCATION ZONE DERM
LOCATION ZONE: TRUNK
LOCATION ZONE: ARM
LOCATION ZONE: LEG
LOCATION ZONE: NECK

## 2024-10-07 ASSESSMENT — LOCATION SIMPLE DESCRIPTION DERM
LOCATION SIMPLE: LEFT THIGH
LOCATION SIMPLE: LEFT ANTERIOR NECK
LOCATION SIMPLE: RIGHT UPPER ARM
LOCATION SIMPLE: LEFT UPPER BACK
LOCATION SIMPLE: RIGHT THIGH
LOCATION SIMPLE: RIGHT POSTERIOR UPPER ARM
LOCATION SIMPLE: RIGHT LOWER BACK
LOCATION SIMPLE: CHEST
LOCATION SIMPLE: LEFT POSTERIOR UPPER ARM

## 2024-10-11 ENCOUNTER — OFFICE VISIT (OUTPATIENT)
Dept: CARDIOLOGY | Facility: MEDICAL CENTER | Age: 79
End: 2024-10-11
Attending: NURSE PRACTITIONER
Payer: MEDICARE

## 2024-10-11 VITALS
WEIGHT: 197 LBS | BODY MASS INDEX: 31.66 KG/M2 | HEIGHT: 66 IN | DIASTOLIC BLOOD PRESSURE: 74 MMHG | RESPIRATION RATE: 14 BRPM | HEART RATE: 89 BPM | OXYGEN SATURATION: 92 % | SYSTOLIC BLOOD PRESSURE: 136 MMHG

## 2024-10-11 DIAGNOSIS — I25.10 CORONARY ARTERY CALCIFICATION SEEN ON CAT SCAN: ICD-10-CM

## 2024-10-11 DIAGNOSIS — I10 ESSENTIAL HYPERTENSION: ICD-10-CM

## 2024-10-11 DIAGNOSIS — E78.5 DYSLIPIDEMIA: Chronic | ICD-10-CM

## 2024-10-11 LAB — EKG IMPRESSION: NORMAL

## 2024-10-11 PROCEDURE — 3075F SYST BP GE 130 - 139MM HG: CPT | Performed by: NURSE PRACTITIONER

## 2024-10-11 PROCEDURE — 3078F DIAST BP <80 MM HG: CPT | Performed by: NURSE PRACTITIONER

## 2024-10-11 PROCEDURE — 99214 OFFICE O/P EST MOD 30 MIN: CPT | Performed by: NURSE PRACTITIONER

## 2024-10-11 PROCEDURE — 93010 ELECTROCARDIOGRAM REPORT: CPT | Performed by: INTERNAL MEDICINE

## 2024-10-11 PROCEDURE — 99213 OFFICE O/P EST LOW 20 MIN: CPT | Performed by: NURSE PRACTITIONER

## 2024-10-11 PROCEDURE — 93005 ELECTROCARDIOGRAM TRACING: CPT | Performed by: NURSE PRACTITIONER

## 2024-10-11 RX ORDER — DILTIAZEM HCL 60 MG
60 TABLET ORAL 2 TIMES DAILY
Qty: 200 TABLET | Refills: 3 | Status: SHIPPED | OUTPATIENT
Start: 2024-10-11

## 2024-10-11 ASSESSMENT — ENCOUNTER SYMPTOMS
NAUSEA: 0
PALPITATIONS: 0
EYES NEGATIVE: 1
MUSCULOSKELETAL NEGATIVE: 1
BRUISES/BLEEDS EASILY: 0
SHORTNESS OF BREATH: 0
NEUROLOGICAL NEGATIVE: 1
CLAUDICATION: 0
NERVOUS/ANXIOUS: 0
ORTHOPNEA: 0
WHEEZING: 0
DEPRESSION: 0
PND: 0
CONSTITUTIONAL NEGATIVE: 1
GASTROINTESTINAL NEGATIVE: 1
DIZZINESS: 0
HALLUCINATIONS: 0
SENSORY CHANGE: 0
RESPIRATORY NEGATIVE: 1

## 2024-10-11 ASSESSMENT — FIBROSIS 4 INDEX: FIB4 SCORE: 0.98

## 2024-10-15 RX ORDER — PROPRANOLOL HCL 20 MG
TABLET ORAL
Qty: 90 TABLET | Refills: 3 | Status: SHIPPED | OUTPATIENT
Start: 2024-10-15

## 2024-10-28 ENCOUNTER — APPOINTMENT (RX ONLY)
Dept: URBAN - METROPOLITAN AREA CLINIC 22 | Facility: CLINIC | Age: 79
Setting detail: DERMATOLOGY
End: 2024-10-28

## 2024-10-28 DIAGNOSIS — L29.89 OTHER PRURITUS: ICD-10-CM

## 2024-10-28 DIAGNOSIS — L259 CONTACT DERMATITIS AND OTHER ECZEMA, UNSPECIFIED CAUSE: ICD-10-CM

## 2024-10-28 PROCEDURE — ? COUNSELING

## 2024-10-28 PROCEDURE — ? TREATMENT REGIMEN

## 2024-10-28 ASSESSMENT — LOCATION SIMPLE DESCRIPTION DERM
LOCATION SIMPLE: LEFT UPPER BACK
LOCATION SIMPLE: RIGHT LOWER BACK
LOCATION SIMPLE: CHEST
LOCATION SIMPLE: LEFT ANTERIOR NECK
LOCATION SIMPLE: RIGHT POSTERIOR UPPER ARM
LOCATION SIMPLE: LEFT THIGH
LOCATION SIMPLE: RIGHT UPPER ARM
LOCATION SIMPLE: LEFT POSTERIOR UPPER ARM
LOCATION SIMPLE: RIGHT THIGH

## 2024-10-28 ASSESSMENT — LOCATION ZONE DERM
LOCATION ZONE: TRUNK
LOCATION ZONE: NECK
LOCATION ZONE: LEG
LOCATION ZONE: ARM

## 2024-10-28 ASSESSMENT — LOCATION DETAILED DESCRIPTION DERM
LOCATION DETAILED: RIGHT SUPERIOR LATERAL LOWER BACK
LOCATION DETAILED: LEFT MID-UPPER BACK
LOCATION DETAILED: RIGHT ANTERIOR MEDIAL PROXIMAL UPPER ARM
LOCATION DETAILED: LEFT ANTERIOR PROXIMAL THIGH
LOCATION DETAILED: LEFT DISTAL POSTERIOR UPPER ARM
LOCATION DETAILED: RIGHT LATERAL SUPERIOR CHEST
LOCATION DETAILED: LEFT SUPERIOR ANTERIOR NECK
LOCATION DETAILED: RIGHT ANTERIOR PROXIMAL THIGH
LOCATION DETAILED: RIGHT ANTERIOR MEDIAL DISTAL THIGH
LOCATION DETAILED: RIGHT DISTAL POSTERIOR UPPER ARM

## 2024-10-30 ENCOUNTER — HOSPITAL ENCOUNTER (OUTPATIENT)
Dept: LAB | Facility: MEDICAL CENTER | Age: 79
End: 2024-10-30
Attending: NURSE PRACTITIONER
Payer: MEDICARE

## 2024-10-30 DIAGNOSIS — E11.65 TYPE 2 DIABETES MELLITUS WITH HYPERGLYCEMIA, WITHOUT LONG-TERM CURRENT USE OF INSULIN (HCC): ICD-10-CM

## 2024-10-30 DIAGNOSIS — E78.5 DYSLIPIDEMIA: ICD-10-CM

## 2024-10-30 LAB
ALBUMIN SERPL BCP-MCNC: 4.1 G/DL (ref 3.2–4.9)
ALBUMIN/GLOB SERPL: 1.5 G/DL
ALP SERPL-CCNC: 71 U/L (ref 30–99)
ALT SERPL-CCNC: 28 U/L (ref 2–50)
ANION GAP SERPL CALC-SCNC: 13 MMOL/L (ref 7–16)
AST SERPL-CCNC: 23 U/L (ref 12–45)
BILIRUB SERPL-MCNC: 0.4 MG/DL (ref 0.1–1.5)
BUN SERPL-MCNC: 21 MG/DL (ref 8–22)
CALCIUM ALBUM COR SERPL-MCNC: 9.5 MG/DL (ref 8.5–10.5)
CALCIUM SERPL-MCNC: 9.6 MG/DL (ref 8.5–10.5)
CHLORIDE SERPL-SCNC: 105 MMOL/L (ref 96–112)
CHOLEST SERPL-MCNC: 151 MG/DL (ref 100–199)
CO2 SERPL-SCNC: 24 MMOL/L (ref 20–33)
CREAT SERPL-MCNC: 0.72 MG/DL (ref 0.5–1.4)
EST. AVERAGE GLUCOSE BLD GHB EST-MCNC: 177 MG/DL
GFR SERPLBLD CREATININE-BSD FMLA CKD-EPI: 85 ML/MIN/1.73 M 2
GLOBULIN SER CALC-MCNC: 2.7 G/DL (ref 1.9–3.5)
GLUCOSE SERPL-MCNC: 185 MG/DL (ref 65–99)
HBA1C MFR BLD: 7.8 % (ref 4–5.6)
HDLC SERPL-MCNC: 49 MG/DL
LDLC SERPL CALC-MCNC: 65 MG/DL
POTASSIUM SERPL-SCNC: 4.6 MMOL/L (ref 3.6–5.5)
PROT SERPL-MCNC: 6.8 G/DL (ref 6–8.2)
SODIUM SERPL-SCNC: 142 MMOL/L (ref 135–145)
TRIGL SERPL-MCNC: 187 MG/DL (ref 0–149)
TSH SERPL-ACNC: 1.96 UIU/ML (ref 0.35–5.5)

## 2024-10-30 PROCEDURE — 84443 ASSAY THYROID STIM HORMONE: CPT

## 2024-10-30 PROCEDURE — 82043 UR ALBUMIN QUANTITATIVE: CPT

## 2024-10-30 PROCEDURE — 80061 LIPID PANEL: CPT

## 2024-10-30 PROCEDURE — 83036 HEMOGLOBIN GLYCOSYLATED A1C: CPT | Mod: GA

## 2024-10-30 PROCEDURE — 36415 COLL VENOUS BLD VENIPUNCTURE: CPT | Mod: GA

## 2024-10-30 PROCEDURE — 80053 COMPREHEN METABOLIC PANEL: CPT

## 2024-10-30 PROCEDURE — 82570 ASSAY OF URINE CREATININE: CPT

## 2024-10-31 LAB
CREAT UR-MCNC: 125.31 MG/DL
MICROALBUMIN UR-MCNC: 2.1 MG/DL
MICROALBUMIN/CREAT UR: 17 MG/G (ref 0–30)

## 2024-11-05 ENCOUNTER — OFFICE VISIT (OUTPATIENT)
Dept: MEDICAL GROUP | Facility: MEDICAL CENTER | Age: 79
End: 2024-11-05
Payer: MEDICARE

## 2024-11-05 VITALS
DIASTOLIC BLOOD PRESSURE: 74 MMHG | BODY MASS INDEX: 32.78 KG/M2 | WEIGHT: 204 LBS | RESPIRATION RATE: 14 BRPM | TEMPERATURE: 97.4 F | SYSTOLIC BLOOD PRESSURE: 124 MMHG | HEART RATE: 100 BPM | OXYGEN SATURATION: 97 % | HEIGHT: 66 IN

## 2024-11-05 DIAGNOSIS — Z78.9 POLST (PHYSICIAN ORDERS FOR LIFE-SUSTAINING TREATMENT): ICD-10-CM

## 2024-11-05 DIAGNOSIS — E78.5 DYSLIPIDEMIA: ICD-10-CM

## 2024-11-05 DIAGNOSIS — J45.909 UNCOMPLICATED ASTHMA, UNSPECIFIED ASTHMA SEVERITY, UNSPECIFIED WHETHER PERSISTENT: ICD-10-CM

## 2024-11-05 DIAGNOSIS — I10 ESSENTIAL HYPERTENSION: Chronic | ICD-10-CM

## 2024-11-05 DIAGNOSIS — Z79.899 CHRONIC USE OF BENZODIAZEPINE FOR THERAPEUTIC PURPOSE: ICD-10-CM

## 2024-11-05 DIAGNOSIS — E11.65 TYPE 2 DIABETES MELLITUS WITH HYPERGLYCEMIA, WITHOUT LONG-TERM CURRENT USE OF INSULIN (HCC): ICD-10-CM

## 2024-11-05 DIAGNOSIS — F41.9 CHRONIC ANXIETY: ICD-10-CM

## 2024-11-05 DIAGNOSIS — F41.9 ANXIETY: ICD-10-CM

## 2024-11-05 PROBLEM — Z63.6 CAREGIVER BURDEN: Chronic | Status: ACTIVE | Noted: 2023-11-02

## 2024-11-05 PROCEDURE — 99214 OFFICE O/P EST MOD 30 MIN: CPT | Performed by: NURSE PRACTITIONER

## 2024-11-05 PROCEDURE — 3074F SYST BP LT 130 MM HG: CPT | Performed by: NURSE PRACTITIONER

## 2024-11-05 PROCEDURE — 3078F DIAST BP <80 MM HG: CPT | Performed by: NURSE PRACTITIONER

## 2024-11-05 RX ORDER — ALPRAZOLAM 0.5 MG
0.5 TABLET ORAL
Qty: 30 TABLET | Refills: 5 | Status: SHIPPED | OUTPATIENT
Start: 2024-11-05 | End: 2024-12-05

## 2024-11-05 RX ORDER — BUDESONIDE AND FORMOTEROL FUMARATE DIHYDRATE 160; 4.5 UG/1; UG/1
AEROSOL RESPIRATORY (INHALATION)
Qty: 3 EACH | Refills: 3 | Status: SHIPPED | OUTPATIENT
Start: 2024-11-05

## 2024-11-05 ASSESSMENT — FIBROSIS 4 INDEX: FIB4 SCORE: 1.27

## 2024-11-05 NOTE — PROGRESS NOTES
Subjective:     Judy Bland is a 79 y.o. female presents to discuss:   Chief Complaint   Patient presents with    Diabetes Follow-up     Verbal consent was acquired by the patient to use [employer] ambient listening note generation during this visit Yes   History of Present Illness  The patient is a 79-year-old female who presents for the evaluation of multiple medical concerns. She is accompanied by an adult male.    6-month follow-up, medication refill    She has been experiencing shoulder pain, which was evaluated by Dr. Danni Romo. A partial tear was identified, and she was treated with Kenalog. Surgery was advised against. She had a similar issue 15 years ago, which has recently started causing discomfort again. She has not yet followed up on either issue.    Patient has upcoming appoint with pulmonology in December.      She experiences coughing spells after eating, which she attributes to eating too quickly. She had an esophageal stricture many years ago, which was treated with dilation. She does not have any issues with liquids.    She has had 7 polyps, all of which were premalignant. Her last endoscopy was performed 10 to 12 years ago. She manages her heartburn with medication.    She has a durable power of  in place.  Requesting to have POLST completed.    Lab Results   Component Value Date/Time    HBA1C 7.8 (H) 10/30/2024 0931    HBA1C 7.8 (A) 05/02/2024 1028    HBA1C 7.2 (H) 09/21/2023 0834    HBA1C 6.7 (H) 09/16/2022 0919     Labs reviewed with patient.    ROS: : see above      Current Outpatient Medications:     ALPRAZolam (XANAX) 0.5 MG Tab, Take 1 Tablet by mouth one time as needed for Anxiety for up to 30 days., Disp: 30 Tablet, Rfl: 5    budesonide-formoterol (SYMBICORT) 160-4.5 MCG/ACT Aerosol, USE 2 INHALATIONS ORALLY   TWICE DAILY WITH SPACER,   RINSE MOUTH AFTER EACH USE., Disp: 3 Each, Rfl: 3    propranolol (INDERAL) 20 MG Tab, TAKE ONE TABLET BY MOUTH EVERY NIGHT AT  "BEDTIME, Disp: 90 Tablet, Rfl: 3    dilTIAZem (CARDIZEM) 60 MG Tab, Take 1 Tablet by mouth 2 times a day., Disp: 200 Tablet, Rfl: 3    lisinopril-hydrochlorothiazide (PRINZIDE) 20-12.5 MG per tablet, TAKE ONE TABLET BY MOUTH EVERY MORNING, Disp: 90 Tablet, Rfl: 3    montelukast (SINGULAIR) 10 MG Tab, Take 1 Tablet by mouth every day., Disp: 90 Tablet, Rfl: 3    budesonide-formoterol (SYMBICORT) 160-4.5 MCG/ACT Aerosol, USE 2 INHALATIONS ORALLY   TWICE DAILY WITH SPACER,   RINSE MOUTH AFTER EACH USE., Disp: 3 Each, Rfl: 3    albuterol (VENTOLIN HFA) 108 (90 Base) MCG/ACT Aero Soln inhalation aerosol, Inhale 2 Puffs every four hours as needed for Shortness of Breath., Disp: 1 Each, Rfl: 11    atorvastatin (LIPITOR) 20 MG Tab, Take 1 Tablet by mouth at bedtime., Disp: 90 Tablet, Rfl: 3    metFORMIN ER (GLUCOPHAGE XR) 500 MG TABLET SR 24 HR, Take 3 Tablets by mouth every day., Disp: 270 Tablet, Rfl: 3    pantoprazole (PROTONIX) 40 MG Tablet Delayed Response, Take 1 Tablet by mouth 2 times a day., Disp: 180 Tablet, Rfl: 3    meloxicam (MOBIC) 15 MG tablet, Take 1 Tablet by mouth every day., Disp: 30 Tablet, Rfl: 0    fluticasone (FLONASE) 50 MCG/ACT nasal spray, USE 1 SPRAY IN EACH NOSTRILTWICE DAILY, Disp: 48 g, Rfl: 3    triamcinolone acetonide (KENALOG) 0.1 % Cream, Apply  topically as needed., Disp: , Rfl:     nystatin (MYCOSTATIN) 961620 UNIT/GM Cream topical cream, Apply 1 Application topically as needed., Disp: , Rfl:     meloxicam (MOBIC) 15 MG tablet, Take 15 mg by mouth every day., Disp: , Rfl:     RESTASIS 0.05 % ophthalmic emulsion, Place 1 Drop in both eyes 2 times a day., Disp: , Rfl:     Cetirizine HCl (ZYRTEC PO), Take 1 Each by mouth as needed., Disp: , Rfl:     Allergies   Allergen Reactions    Ultram [Tramadol Hcl]      ELEVATED LIVER ENZYMES       Objective:     Vitals: /74   Pulse 100   Temp 36.3 °C (97.4 °F)   Resp 14   Ht 1.676 m (5' 6\")   Wt 92.5 kg (204 lb)   SpO2 97%   BMI 32.93 " kg/m²    General: Alert, pleasant, NAD  HEENT: Normocephalic.  Neck supple.   Respiratory: no distress, no audible wheezing, RR -WNL  Skin: Warm, dry, no rashes.  Extremities: No leg edema. No discoloration  Neurological: No tremors  Psych:  Affect/mood is normal, judgement is good, memory is intact, grooming is appropriate.      Assessment/Plan:      Assessment & Plan  History of esophageal stricture, coughing spells.  She experiences coughing spells when eating, which may be related to a past esophageal stricture. She is advised to monitor her symptoms, take small sips of water during meals, and identify any specific food triggers. If symptoms worsen, further evaluation will be necessary.    Follow-up  Return in 3 months for diabetes management.    1. Type 2 diabetes mellitus with hyperglycemia, without long-term current use of insulin (HCC)  Chronic. A1c remains at 7.8%.  She does note she does indulge on sweets.  Monofilament completed.  She will continue with metformin, lifestyle modifications. Update labs prior to next office visit.   - Diabetic Monofilament LE Exam    2. Essential hypertension  Chronic, stable.  Most recent GFR 85.  She will continue lisinopril/HCTZ on diltiazem.     3. Dyslipidemia  Chronic. Lipid panel has previously been stable with statin therapy. LDL < 70. Last LFTs within normal.     4. Chronic use of benzodiazepine for therapeutic purpose  5. Chronic anxiety  6. Anxiety  Chronic, ongoing. Patient does care for her  who has Alzheimer's and most recently it is suspected that he did have a stroke which is left him with some expressive aphasia. She is his primary caregiver. She does have difficult time with sleeping as well as anxiety and is Xanax has been helpful for this. Denies any adverse side effects.  checked. No inconsistencies. Have provided refills and she will follow-up in 6 months for ongoing refills.   - ALPRAZolam (XANAX) 0.5 MG Tab; Take 1 Tablet by mouth one time  as needed for Anxiety for up to 30 days.  Dispense: 30 Tablet; Refill: 5    7. Uncomplicated asthma, unspecified asthma severity, unspecified whether persistent  Chronic.  Compliant with montelukast.  Refill provided for patient on Symbicort.  She has upcoming appoint with pulmonology in December.  - budesonide-formoterol (SYMBICORT) 160-4.5 MCG/ACT Aerosol; USE 2 INHALATIONS ORALLY   TWICE DAILY WITH SPACER,   RINSE MOUTH AFTER EACH USE.  Dispense: 3 Each; Refill: 3    8. POLST (Physician Orders for Life-Sustaining Treatment)  POLST completed in clinic-scanned to patient's chart and provided back to patient.    Return in about 6 months (around 5/5/2025).    {I have placed the above orders and discussed them with an approved delegating provider. The MA is performing the below orders under the direction of Dr. Michael BURROWS

## 2024-12-11 ENCOUNTER — OFFICE VISIT (OUTPATIENT)
Dept: SLEEP MEDICINE | Facility: MEDICAL CENTER | Age: 79
End: 2024-12-11
Attending: NURSE PRACTITIONER
Payer: MEDICARE

## 2024-12-11 VITALS
HEIGHT: 66 IN | WEIGHT: 203.2 LBS | DIASTOLIC BLOOD PRESSURE: 74 MMHG | OXYGEN SATURATION: 94 % | RESPIRATION RATE: 16 BRPM | HEART RATE: 98 BPM | SYSTOLIC BLOOD PRESSURE: 114 MMHG | BODY MASS INDEX: 32.66 KG/M2

## 2024-12-11 DIAGNOSIS — J45.30 MILD PERSISTENT ASTHMA WITHOUT COMPLICATION: Chronic | ICD-10-CM

## 2024-12-11 DIAGNOSIS — Z78.9 NONSMOKER: ICD-10-CM

## 2024-12-11 DIAGNOSIS — G47.33 OSA (OBSTRUCTIVE SLEEP APNEA): Chronic | ICD-10-CM

## 2024-12-11 PROBLEM — Z99.81 CHRONIC RESPIRATORY FAILURE WITH HYPOXIA, ON HOME O2 THERAPY (HCC): Chronic | Status: RESOLVED | Noted: 2021-09-19 | Resolved: 2024-12-11

## 2024-12-11 PROBLEM — J96.11 CHRONIC RESPIRATORY FAILURE WITH HYPOXIA, ON HOME O2 THERAPY (HCC): Chronic | Status: RESOLVED | Noted: 2021-09-19 | Resolved: 2024-12-11

## 2024-12-11 PROCEDURE — 3074F SYST BP LT 130 MM HG: CPT | Performed by: NURSE PRACTITIONER

## 2024-12-11 PROCEDURE — 99213 OFFICE O/P EST LOW 20 MIN: CPT | Performed by: NURSE PRACTITIONER

## 2024-12-11 PROCEDURE — 99215 OFFICE O/P EST HI 40 MIN: CPT | Performed by: NURSE PRACTITIONER

## 2024-12-11 PROCEDURE — 3078F DIAST BP <80 MM HG: CPT | Performed by: NURSE PRACTITIONER

## 2024-12-11 ASSESSMENT — FIBROSIS 4 INDEX: FIB4 SCORE: 1.27

## 2024-12-11 NOTE — PROGRESS NOTES
Chief Complaint   Patient presents with    Follow-Up     Asthma and BACILIO  5/5/2022- Deidra GAGE   BIPAP 15/11cm w/2LPM bleed in O2           HPI:  Judy Bland is a 79 y.o. year old female here today for follow-up on asthma and BACILIO.  Last OV 5/5/22     MMRC stGstrstastdstest:st st1st Exacerbations this year: 0    Currently prescribed Symbicort 160 mcgs 2 puffs twice daily, singular nightly, and albuterol HFA inhaler as needed.  Also uses Zyrtec and Flonase as needed.    Currently using [employer]ICS BIPAP 15/11cm w/2LPM bleed in O2; device obtained 2020.  She register her device for replacement due to recall.   Compliance report 11/10/2024 through 12/9/2024 indicates 100% compliance, average nightly use 11 hours 41 minutes, large mass leak at 5 minutes per night with a reduced AHI 4.1/h.  Reviewed with patient.    Interval events  12/11/2024: Judy notes increased shortness of breath when walking her dog in the last couple days.  She had to stop 3-4 times on an incline and her dog looked at her funny.  She notes her  to have Alzheimer's for the last 15 years that is progressing and also recently had a stroke and now has speech deficits.  She is his main caregiver and does have a daughter in town who sometimes assist.  She notes having caregiver burden and will sometimes resort to going in her room and putting her BiPAP on a quiet moment.  She declines referral to behavioral health for depression.  She has having difficulty getting refills of her Symbicort inhaler but PCP was able to do this for her.  She denies any regular exercise and had not been walking the dog for months since she restarted.  She has a history of significant environmental allergies and finds Zyrtec and Flonase still beneficial.  GERD is well-controlled Protonix.  She feels she does great with her BiPAP going to bed at 9 PM but can take up to an hour or more to fall asleep.  Sometimes she will sleep 2 or wake 1 time on average to use the  restroom and may again have difficulty resuming sleep.  Generally up by 6/7 AM to let the dog out.  She denies morning headaches or shortness of breath.    Sleep hx:  Prior PSG at Dr. Andrade's office 2011 indicated severe sleep apnea with an overall AHI of 38 and a minimum saturation of 67%.   CNOX on Pap 4/19/2019 indicated less than 80% for 593.7 minutes of the night.  Essentially the whole night.  Titration study was recommended but she has continued to decline. She understands risk of nocturnal hypoxia.  She is currently using APAP 9-15cm  Compliance report 6/9-7/8/2020 compliance, average nightly use 8 hours 20 minutes, mean pressure 10 cm, normal mask leak with reduced AHI of 4.1/h.  I reviewed with patient.  She feels her sleep is poor with difficulty initiating and maintaining it. She wakes 1-2x's per night and urinates. Her  has alzheimers who she cares for causing stress.   CNOX indicated basal SPO2 of 87% patient spent 239 minutes less than 88% ox saturation using auto CPAP 10 to 15 cm.  O2 sara of 85%.  Recommendation was titration study.  Titration study 10/8/2020 indicates 85% sleep efficiency, with improved response on BiPAP 15/11 cm with reduced AHI of 0/h and O2 sara of 82%.  Mean SPO2 85%.  Patient did qualify for bleeding O2.      Pulmonary hx:  Nonsmoker.  PFT 12/18/14 indicated FVC 2.05L or 62%, FEV1 1.71L or 68%, FEV1/FVC ratio 84 and DLCO 120% predicted.    PFT 3/15/2019 indicates normal spirometry with FEV1 2.22 L or 94%, FEV1/FVC ratio 83%, TLC 84% and a DLCO 110% predicted.  PFTs 7/9/2020 indicates FVC 2.18 L or 74%, FEV1 1.87 L or 83%, FEV1/FVC ratio 86%, TLC 78% with a DLCO of 91% predicted.  No significant response.  I reviewed finds with patient.  She did try Breo 1 puff daily but not feel it is any more beneficial than Symbicort.   She is compliant with Symbicort 160/4.5mcg 2 puffs BID, Singulair qhs, zyrtec QD, Flonase prn and Ventolin HFA inhaler prn which she rarely uses.     Remains on Nucala injections; started 2017 with eosinophilia noted on CBC. She notes improved control of respiratory symptoms since starting.  She continues her allergy shot injections.    GERD is controlled on medication.    Last echocardiogram was in 2015 indicating EF 65-70%, RVSP 24-29 mmHg.    ROS: As per HPI and otherwise negative if not stated.    Past Medical History:   Diagnosis Date    Acquired polycythemia 2/15/2012    Anesthesia     PONV    Arthritis     Arthropathy 2/15/2012    Benign essential hypertension 2/15/2012    Chronic hepatitis B (HCC) 2/15/2012    Cough 2/15/2012    Difficulty breathing 2/15/2012    Esophageal reflux 2/15/2012    Heart burn     Hepatitis B     Hypercholesterolemia 2/15/2012    Hyperlipidemia 2/15/2012    Hypertension     Indigestion     Jaundice     Murmur 2/15/2012    Obesity 2/15/2012    BACILIO (obstructive sleep apnea) 2/15/2012    Sinus tachycardia 2/15/2012       Past Surgical History:   Procedure Laterality Date    SEPTOPLASTY  3/12/2012    [facility]    TURBINOPLASTY  3/12/2012    [facility]    SINUSOTOMIES  3/12/2012    Performed by MEHDI SWAIN at SURGERY SAME DAY [facility]    TRIGGER FINGER RELEASE  11/23/2009    Performed by MEHDI ONEIL at SURGERY [facility]    CARPAL TUNNEL RELEASE  2009    b/L    KNEE ARTHROPLASTY TOTAL  4/8/08    Performed by ROHAN BLACKWELL at SURGERY [facility]S    KNEE ARTHROPLASTY TOTAL  4/8/08    Performed by ROHAN BLACKWELL at SURGERY [facility] ORS    CATARACT EXTRACTION WITH IOL  2008    OU    ROTATOR CUFF REPAIR  2005    left    TRIGGER FINGER RELEASE  2005    left    CERVICAL CONIZATION      Loop Electrode Excision    HAND SURGERY      PRIMARY C SECTION         Family History   Problem Relation Age of Onset    Other Mother         Family hx of Coronary Arteriosclerosis    Stroke Mother     Other Father         Family  "hx of Coronary Arteriosclerosis    Heart Attack Father 66       Social History     Socioeconomic History    Marital status:      Spouse name: Not on file    Number of children: Not on file    Years of education: Not on file    Highest education level: Not on file   Occupational History    Not on file   Tobacco Use    Smoking status: Never    Smokeless tobacco: Never   Vaping Use    Vaping status: Never Used   Substance and Sexual Activity    Alcohol use: Yes     Alcohol/week: 0.6 oz     Types: 1 Glasses of wine per week     Comment: rarely     Drug use: No    Sexual activity: Not Currently   Other Topics Concern    Not on file   Social History Narrative    Not on file     Social Drivers of Health     Financial Resource Strain: Not on file   Food Insecurity: Not on file   Transportation Needs: Not on file   Physical Activity: Not on file   Stress: Not on file   Social Connections: Not on file   Intimate Partner Violence: Not on file   Housing Stability: Not on file       Allergies as of 12/11/2024 - Reviewed 12/11/2024   Allergen Reaction Noted    Ultram [tramadol hcl]  02/09/2011        Vitals:  /74 (BP Location: Left arm, Patient Position: Sitting, BP Cuff Size: Adult)   Pulse 98   Resp 16   Ht 1.676 m (5' 6\")   Wt 92.2 kg (203 lb 3.2 oz)   SpO2 94%     Current medications as of today   Current Outpatient Medications   Medication Sig Dispense Refill    budesonide-formoterol (SYMBICORT) 160-4.5 MCG/ACT Aerosol USE 2 INHALATIONS ORALLY   TWICE DAILY WITH SPACER,   RINSE MOUTH AFTER EACH USE. 3 Each 3    propranolol (INDERAL) 20 MG Tab TAKE ONE TABLET BY MOUTH EVERY NIGHT AT BEDTIME 90 Tablet 3    dilTIAZem (CARDIZEM) 60 MG Tab Take 1 Tablet by mouth 2 times a day. 200 Tablet 3    lisinopril-hydrochlorothiazide (PRINZIDE) 20-12.5 MG per tablet TAKE ONE TABLET BY MOUTH EVERY MORNING 90 Tablet 3    montelukast (SINGULAIR) 10 MG Tab Take 1 Tablet by mouth every day. 90 Tablet 3    albuterol (VENTOLIN " HFA) 108 (90 Base) MCG/ACT Aero Soln inhalation aerosol Inhale 2 Puffs every four hours as needed for Shortness of Breath. 1 Each 11    atorvastatin (LIPITOR) 20 MG Tab Take 1 Tablet by mouth at bedtime. 90 Tablet 3    metFORMIN ER (GLUCOPHAGE XR) 500 MG TABLET SR 24 HR Take 3 Tablets by mouth every day. 270 Tablet 3    pantoprazole (PROTONIX) 40 MG Tablet Delayed Response Take 1 Tablet by mouth 2 times a day. 180 Tablet 3    meloxicam (MOBIC) 15 MG tablet Take 1 Tablet by mouth every day. 30 Tablet 0    fluticasone (FLONASE) 50 MCG/ACT nasal spray USE 1 SPRAY IN EACH NOSTRILTWICE DAILY 48 g 3    triamcinolone acetonide (KENALOG) 0.1 % Cream Apply  topically as needed.      nystatin (MYCOSTATIN) [id number] UNIT/GM Cream topical cream Apply 1 Application topically as needed.      meloxicam (MOBIC) 15 MG tablet Take 15 mg by mouth every day.      RESTASIS 0.05 % ophthalmic emulsion Place 1 Drop in both eyes 2 times a day.      Cetirizine HCl (ZYRTEC PO) Take 1 Each by mouth as needed.       No current facility-administered medications for this visit.         Physical Exam:   Gen:           Alert and oriented, No apparent distress. Mood and affect appropriate, normal interaction with examiner.  Eyes:          PERRL, EOM intact, sclere white, conjunctive moist.  Ears:          Not examined.   Hearing:     Grossly intact.  Nose:          Normal, no lesions or deformities.  Dentition:    Not examined.   Oropharynx:   Not examined.   Mallampati Classification: Not examined.   Neck:        Supple, trachea midline, no masses.  Respiratory Effort: No intercostal retractions or use of accessory muscles.   Lung Auscultation:      Clear to auscultation bilaterally; no rales, rhonchi or wheezing.  CV:            Regular rate and rhythm. No murmurs, rubs or gallops.  Abd:           Not examined.   Lymphadenopathy: Not examined.   Gait and Station: Normal.  Digits and Nails: No clubbing, cyanosis, petechiae, or nodes.   Cranial  Nerves: II-XII grossly intact.  Skin:        No rashes, lesions or ulcers noted.               Ext:           No cyanosis or edema.      Assessment:  1. BACILIO (obstructive sleep apnea)        2. Mild persistent asthma without complication        3. BMI 32.0-32.9,adult  HEIGHT AND WEIGHT      4. Nonsmoker                 Immunizations:    Flu:9/2024  Pneumovax 23:2017  Prevnar 13:2019  PCV 20: not due  COVID-19: 9/2024    Plan:  BACILIO is well controlled. She will continue to benefit from BIPAP and O2.  Asthma is controlled but having dyspnea due to deconditioning. Encouraged daily walking and albuterol HFA use prior to this. Advised light weights at home as well. She will continue to benefit from current medications.   Juan Carlos in 2 mos  Urged weight loss to healthy diet and activity.  Follow-up in 1 year with compliance report and to review respiratory symptoms, sooner if needed.  Recommend spirometry testing in 2 months after increased conditioning.    Please note that this dictation was created using voice recognition software. I have made every reasonable attempt to correct obvious errors, but it is possible there are errors of grammar and possibly content that I did not discover before finalizing the note.

## 2024-12-11 NOTE — PATIENT INSTRUCTIONS
Complete spirometry testing in a couple months    Start walking more regularly and look into walking pad at home

## 2024-12-17 ENCOUNTER — APPOINTMENT (OUTPATIENT)
Dept: URBAN - METROPOLITAN AREA CLINIC 22 | Facility: CLINIC | Age: 79
Setting detail: DERMATOLOGY
End: 2024-12-17

## 2024-12-17 DIAGNOSIS — L57.0 ACTINIC KERATOSIS: ICD-10-CM

## 2024-12-17 DIAGNOSIS — Z71.89 OTHER SPECIFIED COUNSELING: ICD-10-CM

## 2024-12-17 DIAGNOSIS — D22 MELANOCYTIC NEVI: ICD-10-CM

## 2024-12-17 DIAGNOSIS — L81.4 OTHER MELANIN HYPERPIGMENTATION: ICD-10-CM

## 2024-12-17 DIAGNOSIS — L259 CONTACT DERMATITIS AND OTHER ECZEMA, UNSPECIFIED CAUSE: ICD-10-CM | Status: INADEQUATELY CONTROLLED

## 2024-12-17 DIAGNOSIS — L30.4 ERYTHEMA INTERTRIGO: ICD-10-CM | Status: INADEQUATELY CONTROLLED

## 2024-12-17 DIAGNOSIS — L82.1 OTHER SEBORRHEIC KERATOSIS: ICD-10-CM

## 2024-12-17 DIAGNOSIS — D18.0 HEMANGIOMA: ICD-10-CM

## 2024-12-17 DIAGNOSIS — L82.0 INFLAMED SEBORRHEIC KERATOSIS: ICD-10-CM

## 2024-12-17 PROBLEM — D22.5 MELANOCYTIC NEVI OF TRUNK: Status: ACTIVE | Noted: 2024-12-17

## 2024-12-17 PROBLEM — D18.01 HEMANGIOMA OF SKIN AND SUBCUTANEOUS TISSUE: Status: ACTIVE | Noted: 2024-12-17

## 2024-12-17 PROBLEM — L30.8 OTHER SPECIFIED DERMATITIS: Status: ACTIVE | Noted: 2024-12-17

## 2024-12-17 PROCEDURE — ? LIQUID NITROGEN

## 2024-12-17 PROCEDURE — ? COUNSELING

## 2024-12-17 PROCEDURE — ? PRESCRIPTION

## 2024-12-17 PROCEDURE — 99214 OFFICE O/P EST MOD 30 MIN: CPT | Mod: 25

## 2024-12-17 PROCEDURE — 17003 DESTRUCT PREMALG LES 2-14: CPT | Mod: 59

## 2024-12-17 PROCEDURE — 17000 DESTRUCT PREMALG LESION: CPT | Mod: 59

## 2024-12-17 PROCEDURE — ? MEDICATION COUNSELING

## 2024-12-17 PROCEDURE — 17110 DESTRUCTION B9 LES UP TO 14: CPT

## 2024-12-17 PROCEDURE — ? SUNSCREEN RECOMMENDATIONS

## 2024-12-17 PROCEDURE — ? PRESCRIPTION MEDICATION MANAGEMENT

## 2024-12-17 RX ORDER — TRIAMCINOLONE ACETONIDE 1 MG/G
CREAM TOPICAL BID
Qty: 80 | Refills: 1 | Status: ERX | COMMUNITY
Start: 2024-12-17

## 2024-12-17 RX ORDER — NYSTATIN CREAM 100000 [USP'U]/G
CREAM TOPICAL BID
Qty: 30 | Refills: 2 | Status: ERX

## 2024-12-17 RX ADMIN — TRIAMCINOLONE ACETONIDE: 1 CREAM TOPICAL at 00:00

## 2024-12-17 ASSESSMENT — LOCATION DETAILED DESCRIPTION DERM
LOCATION DETAILED: LEFT INFERIOR ANTERIOR NECK
LOCATION DETAILED: RIGHT SUPERIOR MEDIAL MIDBACK
LOCATION DETAILED: RIGHT DISTAL PRETIBIAL REGION
LOCATION DETAILED: RIGHT CLAVICULAR SKIN
LOCATION DETAILED: RIGHT ULNAR DORSAL HAND
LOCATION DETAILED: RIGHT PROXIMAL RADIAL DORSAL FOREARM
LOCATION DETAILED: RIGHT INFERIOR LATERAL FOREHEAD
LOCATION DETAILED: RIGHT LATERAL FOREHEAD
LOCATION DETAILED: RIGHT CENTRAL LATERAL NECK
LOCATION DETAILED: RIGHT DISTAL DORSAL FOREARM
LOCATION DETAILED: LEFT PROXIMAL DORSAL FOREARM
LOCATION DETAILED: LEFT SUPERIOR ANTERIOR NECK
LOCATION DETAILED: LEFT CENTRAL TEMPLE
LOCATION DETAILED: LEFT VENTRAL PROXIMAL FOREARM
LOCATION DETAILED: RIGHT POSTERIOR ANKLE
LOCATION DETAILED: LEFT DISTAL PRETIBIAL REGION
LOCATION DETAILED: RIGHT SUPERIOR MEDIAL BUCCAL CHEEK
LOCATION DETAILED: LEFT POSTERIOR ANKLE
LOCATION DETAILED: EPIGASTRIC SKIN
LOCATION DETAILED: RIGHT INFERIOR FOREHEAD
LOCATION DETAILED: SUBMENTAL CHIN
LOCATION DETAILED: STERNAL NOTCH
LOCATION DETAILED: LEFT SUPRAPUBIC SKIN
LOCATION DETAILED: LEFT DISTAL DORSAL FOREARM
LOCATION DETAILED: LEFT SUPERIOR MEDIAL UPPER BACK

## 2024-12-17 ASSESSMENT — LOCATION SIMPLE DESCRIPTION DERM
LOCATION SIMPLE: ABDOMEN
LOCATION SIMPLE: RIGHT FOREARM
LOCATION SIMPLE: LEFT TEMPLE
LOCATION SIMPLE: RIGHT FOREHEAD
LOCATION SIMPLE: RIGHT ANKLE
LOCATION SIMPLE: GROIN
LOCATION SIMPLE: LEFT UPPER BACK
LOCATION SIMPLE: LEFT ANKLE
LOCATION SIMPLE: RIGHT HAND
LOCATION SIMPLE: CHEST
LOCATION SIMPLE: RIGHT CHEEK
LOCATION SIMPLE: RIGHT CLAVICULAR SKIN
LOCATION SIMPLE: SUBMENTAL CHIN
LOCATION SIMPLE: RIGHT LOWER BACK
LOCATION SIMPLE: LEFT ANTERIOR NECK
LOCATION SIMPLE: LEFT FOREARM
LOCATION SIMPLE: RIGHT PRETIBIAL REGION
LOCATION SIMPLE: LEFT PRETIBIAL REGION
LOCATION SIMPLE: NECK

## 2024-12-17 ASSESSMENT — LOCATION ZONE DERM
LOCATION ZONE: NECK
LOCATION ZONE: FACE
LOCATION ZONE: LEG
LOCATION ZONE: HAND
LOCATION ZONE: TRUNK
LOCATION ZONE: ARM

## 2024-12-17 ASSESSMENT — SEVERITY ASSESSMENT 2020: SEVERITY 2020: MODERATE

## 2024-12-17 ASSESSMENT — SEVERITY ASSESSMENT: SEVERITY: MODERATE

## 2024-12-17 NOTE — PROCEDURE: LIQUID NITROGEN
Render Note In Bullet Format When Appropriate: No
Detail Level: Detailed
Post-Care Instructions: I reviewed with the patient in detail post-care instructions. Patient is to wear sunprotection, and avoid picking at any of the treated lesions. Pt may apply Vaseline to crusted or scabbing areas.
Consent: The patient's consent was obtained including but not limited to risks of crusting, scabbing, blistering, scarring, darker or lighter pigmentary change, recurrence, incomplete removal and infection.
Show Applicator Variable?: Yes
Number Of Freeze-Thaw Cycles: 2 freeze-thaw cycles
Duration Of Freeze Thaw-Cycle (Seconds): 3
Number Of Freeze-Thaw Cycles: 3 freeze-thaw cycles
Medical Necessity Clause: This procedure was medically necessary because the lesions that were treated were:
Medical Necessity Information: It is in your best interest to select a reason for this procedure from the list below. All of these items fulfill various CMS LCD requirements except the new and changing color options.
Application Tool (Optional): Liquid Nitrogen Sprayer
Spray Paint Text: The liquid nitrogen was applied to the skin utilizing a spray paint frosting technique.

## 2024-12-17 NOTE — PROCEDURE: MEDICATION COUNSELING
Imiquimod Counseling:  I discussed with the patient the risks of imiquimod including but not limited to erythema, scaling, itching, weeping, crusting, and pain.  Patient understands that the inflammatory response to imiquimod is variable from person to person and was educated regarded proper titration schedule.  If flu-like symptoms develop, patient knows to discontinue the medication and contact us.
Clindamycin Pregnancy And Lactation Text: This medication can be used in pregnancy if certain situations. Clindamycin is also present in breast milk.
Aklief Pregnancy And Lactation Text: It is unknown if this medication is safe to use during pregnancy.  It is unknown if this medication is excreted in breast milk.  Breastfeeding women should use the topical cream on the smallest area of the skin for the shortest time needed while breastfeeding.  Do not apply to nipple and areola.
Olanzapine Pregnancy And Lactation Text: This medication is pregnancy category C.   There are no adequate and well controlled trials with olanzapine in pregnant females.  Olanzapine should be used during pregnancy only if the potential benefit justifies the potential risk to the fetus.   In a study in lactating healthy women, olanzapine was excreted in breast milk.  It is recommended that women taking olanzapine should not breast feed.
Methotrexate Pregnancy And Lactation Text: This medication is Pregnancy Category X and is known to cause fetal harm. This medication is excreted in breast milk.
Siliq Pregnancy And Lactation Text: The risk during pregnancy and breastfeeding is uncertain with this medication.
Hydroxychloroquine Pregnancy And Lactation Text: This medication has been shown to cause fetal harm but it isn't assigned a Pregnancy Risk Category. There are small amounts excreted in breast milk.
Adbry Counseling: I discussed with the patient the risks of tralokinumab including but not limited to eye infection and irritation, cold sores, injection site reactions, worsening of asthma, allergic reactions and increased risk of parasitic infection.  Live vaccines should be avoided while taking tralokinumab. The patient understands that monitoring is required and they must alert us or the primary physician if symptoms of infection or other concerning signs are noted.
Azelaic Acid Pregnancy And Lactation Text: This medication is considered safe during pregnancy and breast feeding.
Itraconazole Counseling:  I discussed with the patient the risks of itraconazole including but not limited to liver damage, nausea/vomiting, neuropathy, and severe allergy.  The patient understands that this medication is best absorbed when taken with acidic beverages such as non-diet cola or ginger ale.  The patient understands that monitoring is required including baseline LFTs and repeat LFTs at intervals.  The patient understands that they are to contact us or the primary physician if concerning signs are noted.
Oral Minoxidil Counseling- I discussed with the patient the risks of oral minoxidil including but not limited to shortness of breath, swelling of the feet or ankles, dizziness, lightheadedness, unwanted hair growth and allergic reaction.  The patient verbalized understanding of the proper use and possible adverse effects of oral minoxidil.  All of the patient's questions and concerns were addressed.
Topical Ketoconazole Counseling: Patient counseled that this medication may cause skin irritation or allergic reactions.  In the event of skin irritation, the patient was advised to reduce the amount of the drug applied or use it less frequently.   The patient verbalized understanding of the proper use and possible adverse effects of ketoconazole.  All of the patient's questions and concerns were addressed.
Simlandi Counseling:  I discussed with the patient the risks of adalimumab including but not limited to myelosuppression, immunosuppression, autoimmune hepatitis, demyelinating diseases, lymphoma, and serious infections.  The patient understands that monitoring is required including a PPD at baseline and must alert us or the primary physician if symptoms of infection or other concerning signs are noted.
Zyclara Counseling:  I discussed with the patient the risks of imiquimod including but not limited to erythema, scaling, itching, weeping, crusting, and pain.  Patient understands that the inflammatory response to imiquimod is variable from person to person and was educated regarded proper titration schedule.  If flu-like symptoms develop, patient knows to discontinue the medication and contact us.
Picato Counseling:  I discussed with the patient the risks of Picato including but not limited to erythema, scaling, itching, weeping, crusting, and pain.
Imiquimod Pregnancy And Lactation Text: This medication is Pregnancy Category C. It is unknown if this medication is excreted in breast milk.
Adbry Pregnancy And Lactation Text: It is unknown if this medication will adversely affect pregnancy or breast feeding.
Low Dose Naltrexone Counseling- I discussed with the patient the potential risks and side effects of low dose naltrexone including but not limited to: more vivid dreams, headaches, nausea, vomiting, abdominal pain, fatigue, dizziness, and anxiety.
Doxycycline Counseling:  Patient counseled regarding possible photosensitivity and increased risk for sunburn.  Patient instructed to avoid sunlight, if possible.  When exposed to sunlight, patients should wear protective clothing, sunglasses, and sunscreen.  The patient was instructed to call the office immediately if the following severe adverse effects occur:  hearing changes, easy bruising/bleeding, severe headache, or vision changes.  The patient verbalized understanding of the proper use and possible adverse effects of doxycycline.  All of the patient's questions and concerns were addressed.
Benzoyl Peroxide Counseling: Patient counseled that medicine may cause skin irritation and bleach clothing.  In the event of skin irritation, the patient was advised to reduce the amount of the drug applied or use it less frequently.   The patient verbalized understanding of the proper use and possible adverse effects of benzoyl peroxide.  All of the patient's questions and concerns were addressed.
Prednisone Counseling:  I discussed with the patient the risks of prolonged use of prednisone including but not limited to weight gain, insomnia, osteoporosis, mood changes, diabetes, susceptibility to infection, glaucoma and high blood pressure.  In cases where prednisone use is prolonged, patients should be monitored with blood pressure checks, serum glucose levels and an eye exam.  Additionally, the patient may need to be placed on GI prophylaxis, PCP prophylaxis, and calcium and vitamin D supplementation and/or a bisphosphonate.  The patient verbalized understanding of the proper use and the possible adverse effects of prednisone.  All of the patient's questions and concerns were addressed.
Oral Minoxidil Pregnancy And Lactation Text: This medication should only be used when clearly needed if you are pregnant, attempting to become pregnant or breast feeding.
Topical Ketoconazole Pregnancy And Lactation Text: This medication is Pregnancy Category B and is considered safe during pregnancy. It is unknown if it is excreted in breast milk.
Doxycycline Pregnancy And Lactation Text: This medication is Pregnancy Category D and not consider safe during pregnancy. It is also excreted in breast milk but is considered safe for shorter treatment courses.
Azithromycin Counseling:  I discussed with the patient the risks of azithromycin including but not limited to GI upset, allergic reaction, drug rash, diarrhea, and yeast infections.
Klisyri Counseling:  I discussed with the patient the risks of Klisyri including but not limited to erythema, scaling, itching, weeping, crusting, and pain.
Itraconazole Pregnancy And Lactation Text: This medication is Pregnancy Category C and it isn't know if it is safe during pregnancy. It is also excreted in breast milk.
Low Dose Naltrexone Pregnancy And Lactation Text: Naltrexone is pregnancy category C.  There have been no adequate and well-controlled studies in pregnant women.  It should be used in pregnancy only if the potential benefit justifies the potential risk to the fetus.   Limited data indicates that naltrexone is minimally excreted into breastmilk.
Prednisone Pregnancy And Lactation Text: This medication is Pregnancy Category C and it isn't know if it is safe during pregnancy. This medication is excreted in breast milk.
Simlandi Pregnancy And Lactation Text: This medication is Pregnancy Category B and is considered safe during pregnancy. It is unknown if this medication is excreted in breast milk.
Topical Metronidazole Counseling: Metronidazole is a topical antibiotic medication. You may experience burning, stinging, redness, or allergic reactions.  Please call our office if you develop any problems from using this medication.
Erivedge Pregnancy And Lactation Text: This medication is Pregnancy Category X and is absolutely contraindicated during pregnancy. It is unknown if it is excreted in breast milk.
Benzoyl Peroxide Pregnancy And Lactation Text: This medication is Pregnancy Category C. It is unknown if benzoyl peroxide is excreted in breast milk.
Bimzelx Counseling:  I discussed with the patient the risks of Bimzelx including but not limited to depression, immunosuppression, allergic reactions and infections.  The patient understands that monitoring is required including a PPD at baseline and must alert us or the primary physician if symptoms of infection or other concerning signs are noted.
Erythromycin Counseling:  I discussed with the patient the risks of erythromycin including but not limited to GI upset, allergic reaction, drug rash, diarrhea, increase in liver enzymes, and yeast infections.
Klisyri Pregnancy And Lactation Text: It is unknown if this medication can harm a developing fetus or if it is excreted in breast milk.
Ketoconazole Counseling:   Patient counseled regarding improving absorption with orange juice.  Adverse effects include but are not limited to breast enlargement, headache, diarrhea, nausea, upset stomach, liver function test abnormalities, taste disturbance, and stomach pain.  There is a rare possibility of liver failure that can occur when taking ketoconazole. The patient understands that monitoring of LFTs may be required, especially at baseline. The patient verbalized understanding of the proper use and possible adverse effects of ketoconazole.  All of the patient's questions and concerns were addressed.
Otezla Counseling: The side effects of Otezla were discussed with the patient, including but not limited to worsening or new depression, weight loss, diarrhea, nausea, upper respiratory tract infection, and headache. Patient instructed to call the office should any adverse effect occur.  The patient verbalized understanding of the proper use and possible adverse effects of Otezla.  All the patient's questions and concerns were addressed.
Protopic Counseling: Patient may experience a mild burning sensation during topical application. Protopic is not approved in children less than 2 years of age. There have been case reports of hematologic and skin malignancies in patients using topical calcineurin inhibitors although causality is questionable.
Azithromycin Pregnancy And Lactation Text: This medication is considered safe during pregnancy and is also secreted in breast milk.
Libtayo Counseling- I discussed with the patient the risks of Libtayo including but not limited to nausea, vomiting, diarrhea, and bone or muscle pain.  The patient verbalized understanding of the proper use and possible adverse effects of Libtayo.  All of the patient's questions and concerns were addressed.
Carac Counseling:  I discussed with the patient the risks of Carac including but not limited to erythema, scaling, itching, weeping, crusting, and pain.
Simponi Counseling:  I discussed with the patient the risks of golimumab including but not limited to myelosuppression, immunosuppression, autoimmune hepatitis, demyelinating diseases, lymphoma, and serious infections.  The patient understands that monitoring is required including a PPD at baseline and must alert us or the primary physician if symptoms of infection or other concerning signs are noted.
Otezla Pregnancy And Lactation Text: This medication is Pregnancy Category C and it isn't known if it is safe during pregnancy. It is unknown if it is excreted in breast milk.
Topical Metronidazole Pregnancy And Lactation Text: This medication is Pregnancy Category B and considered safe during pregnancy.  It is also considered safe to use while breastfeeding.
Niacinamide Counseling: I recommended taking niacin or niacinamide, also know as vitamin B3, twice daily. Recent evidence suggests that taking vitamin B3 (500 mg twice daily) can reduce the risk of actinic keratoses and non-melanoma skin cancers. Side effects of vitamin B3 include flushing and headache.
Bimzelx Pregnancy And Lactation Text: This medication crosses the placenta and the safety is uncertain during pregnancy. It is unknown if this medication is present in breast milk.
Erythromycin Pregnancy And Lactation Text: This medication is Pregnancy Category B and is considered safe during pregnancy. It is also excreted in breast milk.
Bactrim Counseling:  I discussed with the patient the risks of sulfa antibiotics including but not limited to GI upset, allergic reaction, drug rash, diarrhea, dizziness, photosensitivity, and yeast infections.  Rarely, more serious reactions can occur including but not limited to aplastic anemia, agranulocytosis, methemoglobinemia, blood dyscrasias, liver or kidney failure, lung infiltrates or desquamative/blistering drug rashes.
Minoxidil Counseling: Minoxidil is a topical medication which can increase blood flow where it is applied. It is uncertain how this medication increases hair growth. Side effects are uncommon and include stinging and allergic reactions.
Ketoconazole Pregnancy And Lactation Text: This medication is Pregnancy Category C and it isn't know if it is safe during pregnancy. It is also excreted in breast milk and breast feeding isn't recommended.
Protopic Pregnancy And Lactation Text: This medication is Pregnancy Category C. It is unknown if this medication is excreted in breast milk when applied topically.
Sarecycline Pregnancy And Lactation Text: This medication is Pregnancy Category D and not consider safe during pregnancy. It is also excreted in breast milk.
Cyclophosphamide Counseling:  I discussed with the patient the risks of cyclophosphamide including but not limited to hair loss, hormonal abnormalities, decreased fertility, abdominal pain, diarrhea, nausea and vomiting, bone marrow suppression and infection. The patient understands that monitoring is required while taking this medication.
Nemluvio Counseling: I discussed with the patient the risks of nemolizumab including but not limited to headache, gastrointestinal complaints, nasopharyngitis, musculoskeletal complaints, injection site reactions, and allergic reactions. The patient understands that monitoring is required and they must alert us or the primary physician if any side effects are noted.
Tranexamic Acid Pregnancy And Lactation Text: It is unknown if this medication is safe during pregnancy or breast feeding.
Wegovy Pregnancy And Lactation Text: The fetal risk of this medication is unknown and taking while pregnant is not recommended. It is unknown if this medication is present in breast milk.
Cibinqo Pregnancy And Lactation Text: It is unknown if this medication will adversely affect pregnancy or breast feeding.  You should not take this medication if you are currently pregnant or planning a pregnancy or while breastfeeding.
Gabapentin Counseling: I discussed with the patient the risks of gabapentin including but not limited to dizziness, somnolence, fatigue and ataxia.
Eucrisa Counseling: Patient may experience a mild burning sensation during topical application. Eucrisa is not approved in children less than 2 years of age.
Birth Control Pills Counseling: Birth Control Pill Counseling: I discussed with the patient the potential side effects of OCPs including but not limited to increased risk of stroke, heart attack, thrombophlebitis, deep venous thrombosis, hepatic adenomas, breast changes, GI upset, headaches, and depression.  The patient verbalized understanding of the proper use and possible adverse effects of OCPs. All of the patient's questions and concerns were addressed.
Bactrim Pregnancy And Lactation Text: This medication is Pregnancy Category D and is known to cause fetal risk.  It is also excreted in breast milk.
Zepbound Counseling: I reviewed the possible side effects including: thyroid tumors, kidney disease, gallbladder disease, abdominal pain, constipation, diarrhea, nausea, vomiting and pancreatitis. Do not take this medication if you have a history or family history of multiple endocrine neoplasia syndrome type 2. Side effects reviewed, pt to contact office should one occur.
Cyclophosphamide Pregnancy And Lactation Text: This medication is Pregnancy Category D and it isn't considered safe during pregnancy. This medication is excreted in breast milk.
Xeljanz Counseling: I discussed with the patient the risks of Xeljanz therapy including increased risk of infection, liver issues, headache, diarrhea, or cold symptoms. Live vaccines should be avoided. They were instructed to call if they have any problems.
Nemluvio Pregnancy And Lactation Text: It is not known if Nemluvio causes fetal harm or is present in breast milk. Please proceed with caution if patients who are pregnant or breastfeeding.
High Dose Vitamin A Pregnancy And Lactation Text: High dose vitamin A therapy is contraindicated during pregnancy and breast feeding.
Terbinafine Counseling: Patient counseling regarding adverse effects of terbinafine including but not limited to headache, diarrhea, rash, upset stomach, liver function test abnormalities, itching, taste/smell disturbance, nausea, abdominal pain, and flatulence.  There is a rare possibility of liver failure that can occur when taking terbinafine.  The patient understands that a baseline LFT and kidney function test may be required. The patient verbalized understanding of the proper use and possible adverse effects of terbinafine.  All of the patient's questions and concerns were addressed.
Humira Counseling:  I discussed with the patient the risks of adalimumab including but not limited to myelosuppression, immunosuppression, autoimmune hepatitis, demyelinating diseases, lymphoma, and serious infections.  The patient understands that monitoring is required including a PPD at baseline and must alert us or the primary physician if symptoms of infection or other concerning signs are noted.
Litfulo Counseling: I discussed with the patient the risks of Litfulo therapy including but not limited to upper respiratory tract infections, shingles, cold sores, and nausea. Live vaccines should be avoided.  This medication has been linked to serious infections; higher rate of mortality; malignancy and lymphoproliferative disorders; major adverse cardiovascular events; thrombosis; gastrointestinal perforations; neutropenia; lymphopenia; anemia; liver enzyme elevations; and lipid elevations.
Tetracycline Counseling: Patient counseled regarding possible photosensitivity and increased risk for sunburn.  Patient instructed to avoid sunlight, if possible.  When exposed to sunlight, patients should wear protective clothing, sunglasses, and sunscreen.  The patient was instructed to call the office immediately if the following severe adverse effects occur:  hearing changes, easy bruising/bleeding, severe headache, or vision changes.  The patient verbalized understanding of the proper use and possible adverse effects of tetracycline.  All of the patient's questions and concerns were addressed. Patient understands to avoid pregnancy while on therapy due to potential birth defects.
Gabapentin Pregnancy And Lactation Text: This medication is Pregnancy Category C and isn't considered safe during pregnancy. It is excreted in breast milk.
Xeljamilz Pregnancy And Lactation Text: This medication is Pregnancy Category D and is not considered safe during pregnancy.  The risk during breast feeding is also uncertain.
Valtrex Counseling: I discussed with the patient the risks of valacyclovir including but not limited to kidney damage, nausea, vomiting and severe allergy.  The patient understands that if the infection seems to be worsening or is not improving, they are to call.
Xolair Counseling:  Patient informed of potential adverse effects including but not limited to fever, muscle aches, rash and allergic reactions.  The patient verbalized understanding of the proper use and possible adverse effects of Xolair.  All of the patient's questions and concerns were addressed.
Tazorac Counseling:  Patient advised that medication is irritating and drying.  Patient may need to apply sparingly and wash off after an hour before eventually leaving it on overnight.  The patient verbalized understanding of the proper use and possible adverse effects of tazorac.  All of the patient's questions and concerns were addressed.
Terbinafine Pregnancy And Lactation Text: This medication is Pregnancy Category B and is considered safe during pregnancy. It is also excreted in breast milk and breast feeding isn't recommended.
Birth Control Pills Pregnancy And Lactation Text: This medication should be avoided if pregnant and for the first 30 days post-partum.
Valtrex Pregnancy And Lactation Text: this medication is Pregnancy Category B and is considered safe during pregnancy. This medication is not directly found in breast milk but it's metabolite acyclovir is present.
Eucrisa Pregnancy And Lactation Text: This medication has not been assigned a Pregnancy Risk Category but animal studies failed to show danger with the topical medication. It is unknown if the medication is excreted in breast milk.
Cyclosporine Counseling:  I discussed with the patient the risks of cyclosporine including but not limited to hypertension, gingival hyperplasia,myelosuppression, immunosuppression, liver damage, kidney damage, neurotoxicity, lymphoma, and serious infections. The patient understands that monitoring is required including baseline blood pressure, CBC, CMP, lipid panel and uric acid, and then 1-2 times monthly CMP and blood pressure.
Rituxan Counseling:  I discussed with the patient the risks of Rituxan infusions. Side effects can include infusion reactions, severe drug rashes including mucocutaneous reactions, reactivation of latent hepatitis and other infections and rarely progressive multifocal leukoencephalopathy.  All of the patient's questions and concerns were addressed.
Cephalexin Counseling: I counseled the patient regarding use of cephalexin as an antibiotic for prophylactic and/or therapeutic purposes. Cephalexin (commonly prescribed under brand name Keflex) is a cephalosporin antibiotic which is active against numerous classes of bacteria, including most skin bacteria. Side effects may include nausea, diarrhea, gastrointestinal upset, rash, hives, yeast infections, and in rare cases, hepatitis, kidney disease, seizures, fever, confusion, neurologic symptoms, and others. Patients with severe allergies to penicillin medications are cautioned that there is about a 10% incidence of cross-reactivity with cephalosporins. When possible, patients with penicillin allergies should use alternatives to cephalosporins for antibiotic therapy.
Xolair Pregnancy And Lactation Text: This medication is Pregnancy Category B and is considered safe during pregnancy. This medication is excreted in breast milk.
Glycopyrrolate Counseling:  I discussed with the patient the risks of glycopyrrolate including but not limited to skin rash, drowsiness, dry mouth, difficulty urinating, and blurred vision.
Tazorac Pregnancy And Lactation Text: This medication is not safe during pregnancy. It is unknown if this medication is excreted in breast milk.
Litfulo Pregnancy And Lactation Text: Based on animal studies, Lifulo may cause embryo-fetal harm when administered to pregnant women.  The medication should not be used in pregnancy.  Breastfeeding is not recommended during treatment.
Detail Level: Zone
Hydroquinone Counseling:  Patient advised that medication may result in skin irritation, lightening (hypopigmentation), dryness, and burning.  In the event of skin irritation, the patient was advised to reduce the amount of the drug applied or use it less frequently.  Rarely, spots that are treated with hydroquinone can become darker (pseudoochronosis).  Should this occur, patient instructed to stop medication and call the office. The patient verbalized understanding of the proper use and possible adverse effects of hydroquinone.  All of the patient's questions and concerns were addressed.
Rituxan Pregnancy And Lactation Text: This medication is Pregnancy Category C and it isn't know if it is safe during pregnancy. It is unknown if this medication is excreted in breast milk but similar antibodies are known to be excreted.
Spironolactone Counseling: Patient advised regarding risks of diarrhea, abdominal pain, hyperkalemia, birth defects (for female patients), liver toxicity and renal toxicity. The patient may need blood work to monitor liver and kidney function and potassium levels while on therapy. The patient verbalized understanding of the proper use and possible adverse effects of spironolactone.  All of the patient's questions and concerns were addressed.
Glycopyrrolate Pregnancy And Lactation Text: This medication is Pregnancy Category B and is considered safe during pregnancy. It is unknown if it is excreted breast milk.
Use Enhanced Medication Counseling?: No
Cephalexin Pregnancy And Lactation Text: This medication is Pregnancy Category B and considered safe during pregnancy.  It is also excreted in breast milk but can be used safely for shorter doses.
Topical Clindamycin Counseling: Patient counseled that this medication may cause skin irritation or allergic reactions.  In the event of skin irritation, the patient was advised to reduce the amount of the drug applied or use it less frequently.   The patient verbalized understanding of the proper use and possible adverse effects of clindamycin.  All of the patient's questions and concerns were addressed.
Cantharidin Pregnancy And Lactation Text: This medication has not been proven safe during pregnancy. It is unknown if this medication is excreted in breast milk.
Spironolactone Pregnancy And Lactation Text: This medication can cause feminization of the male fetus and should be avoided during pregnancy. The active metabolite is also found in breast milk.
Azelaic Acid Counseling: Patient counseled that medicine may cause skin irritation and to avoid applying near the eyes.  In the event of skin irritation, the patient was advised to reduce the amount of the drug applied or use it less frequently.   The patient verbalized understanding of the proper use and possible adverse effects of azelaic acid.  All of the patient's questions and concerns were addressed.
Methotrexate Counseling:  Patient counseled regarding adverse effects of methotrexate including but not limited to nausea, vomiting, abnormalities in liver function tests. Patients may develop mouth sores, rash, diarrhea, and abnormalities in blood counts. The patient understands that monitoring is required including LFT's and blood counts.  There is a rare possibility of scarring of the liver and lung problems that can occur when taking methotrexate. Persistent nausea, loss of appetite, pale stools, dark urine, cough, and shortness of breath should be reported immediately. Patient advised to discontinue methotrexate treatment at least three months before attempting to become pregnant.  I discussed the need for folate supplements while taking methotrexate.  These supplements can decrease side effects during methotrexate treatment. The patient verbalized understanding of the proper use and possible adverse effects of methotrexate.  All of the patient's questions and concerns were addressed.
Siliq Counseling:  I discussed with the patient the risks of Siliq including but not limited to new or worsening depression, suicidal thoughts and behavior, immunosuppression, malignancy, posterior leukoencephalopathy syndrome, and serious infections.  The patient understands that monitoring is required including a PPD at baseline and must alert us or the primary physician if symptoms of infection or other concerning signs are noted. There is also a special program designed to monitor depression which is required with Siliq.
Clindamycin Counseling: I counseled the patient regarding use of clindamycin as an antibiotic for prophylactic and/or therapeutic purposes. Clindamycin is active against numerous classes of bacteria, including skin bacteria. Side effects may include nausea, diarrhea, gastrointestinal upset, rash, hives, yeast infections, and in rare cases, colitis.
Aklief counseling:  Patient advised to apply a pea-sized amount only at bedtime and wait 30 minutes after washing their face before applying.  If too drying, patient may add a non-comedogenic moisturizer.  The most commonly reported side effects including irritation, redness, scaling, dryness, stinging, burning, itching, and increased risk of sunburn.  The patient verbalized understanding of the proper use and possible adverse effects of retinoids.  All of the patient's questions and concerns were addressed.
Hydroxychloroquine Counseling:  I discussed with the patient that a baseline ophthalmologic exam is needed at the start of therapy and every year thereafter while on therapy. A CBC may also be warranted for monitoring.  The side effects of this medication were discussed with the patient, including but not limited to agranulocytosis, aplastic anemia, seizures, rashes, retinopathy, and liver toxicity. Patient instructed to call the office should any adverse effect occur.  The patient verbalized understanding of the proper use and possible adverse effects of Plaquenil.  All the patient's questions and concerns were addressed.
Stelara Counseling:  I discussed with the patient the risks of ustekinumab including but not limited to immunosuppression, malignancy, posterior leukoencephalopathy syndrome, and serious infections.  The patient understands that monitoring is required including a PPD at baseline and must alert us or the primary physician if symptoms of infection or other concerning signs are noted.
Olumiant Pregnancy And Lactation Text: Based on animal studies, Olumiant may cause embryo-fetal harm when administered to pregnant women.  The medication should not be used in pregnancy.  Breastfeeding is not recommended during treatment.
Solaraze Counseling:  I discussed with the patient the risks of Solaraze including but not limited to erythema, scaling, itching, weeping, crusting, and pain.
Dupixent Pregnancy And Lactation Text: This medication likely crosses the placenta but the risk for the fetus is uncertain. This medication is excreted in breast milk.
Quinolones Counseling:  I discussed with the patient the risks of fluoroquinolones including but not limited to GI upset, allergic reaction, drug rash, diarrhea, dizziness, photosensitivity, yeast infections, liver function test abnormalities, tendonitis/tendon rupture.
Dutasteride Female Counseling: Dutasteride Counseling:  I discussed with the patient the risks of use of dutasteride including but not limited to decreased libido and sexual dysfunction. Explained the teratogenic nature of the medication and stressed the importance of not getting pregnant during treatment. All of the patient's questions and concerns were addressed.
5-Fu Pregnancy And Lactation Text: This medication is Pregnancy Category X and contraindicated in pregnancy and in women who may become pregnant. It is unknown if this medication is excreted in breast milk.
Ilumya Counseling: I discussed with the patient the risks of tildrakizumab including but not limited to immunosuppression, malignancy, posterior leukoencephalopathy syndrome, and serious infections.  The patient understands that monitoring is required including a PPD at baseline and must alert us or the primary physician if symptoms of infection or other concerning signs are noted.
Colchicine Counseling:  Patient counseled regarding adverse effects including but not limited to stomach upset (nausea, vomiting, stomach pain, or diarrhea).  Patient instructed to limit alcohol consumption while taking this medication.  Colchicine may reduce blood counts especially with prolonged use.  The patient understands that monitoring of kidney function and blood counts may be required, especially at baseline. The patient verbalized understanding of the proper use and possible adverse effects of colchicine.  All of the patient's questions and concerns were addressed.
Azathioprine Counseling:  I discussed with the patient the risks of azathioprine including but not limited to myelosuppression, immunosuppression, hepatotoxicity, lymphoma, and infections.  The patient understands that monitoring is required including baseline LFTs, Creatinine, possible TPMP genotyping and weekly CBCs for the first month and then every 2 weeks thereafter.  The patient verbalized understanding of the proper use and possible adverse effects of azathioprine.  All of the patient's questions and concerns were addressed.
Bexarotene Counseling:  I discussed with the patient the risks of bexarotene including but not limited to hair loss, dry lips/skin/eyes, liver abnormalities, hyperlipidemia, pancreatitis, depression/suicidal ideation, photosensitivity, drug rash/allergic reactions, hypothyroidism, anemia, leukopenia, infection, cataracts, and teratogenicity.  Patient understands that they will need regular blood tests to check lipid profile, liver function tests, white blood cell count, thyroid function tests and pregnancy test if applicable.
Solaraze Pregnancy And Lactation Text: This medication is Pregnancy Category B and is considered safe. There is some data to suggest avoiding during the third trimester. It is unknown if this medication is excreted in breast milk.
Doxepin Counseling:  Patient advised that the medication is sedating and not to drive a car after taking this medication. Patient informed of potential adverse effects including but not limited to dry mouth, urinary retention, and blurry vision.  The patient verbalized understanding of the proper use and possible adverse effects of doxepin.  All of the patient's questions and concerns were addressed.
Sski Pregnancy And Lactation Text: This medication is Pregnancy Category D and isn't considered safe during pregnancy. It is excreted in breast milk.
Drysol Counseling:  I discussed with the patient the risks of drysol/aluminum chloride including but not limited to skin rash, itching, irritation, burning.
Bexarotene Pregnancy And Lactation Text: This medication is Pregnancy Category X and should not be given to women who are pregnant or may become pregnant. This medication should not be used if you are breast feeding.
Semaglutide Counseling: I reviewed the possible side effects including: thyroid tumors, kidney disease, gallbladder disease, abdominal pain, constipation, diarrhea, nausea, vomiting and pancreatitis. Do not take this medication if you have a history or family history of multiple endocrine neoplasia syndrome type 2. Side effects reviewed, pt to contact office should one occur.
Winlevi Counseling:  I discussed with the patient the risks of topical clascoterone including but not limited to erythema, scaling, itching, and stinging. Patient voiced their understanding.
Griseofulvin Counseling:  I discussed with the patient the risks of griseofulvin including but not limited to photosensitivity, cytopenia, liver damage, nausea/vomiting and severe allergy.  The patient understands that this medication is best absorbed when taken with a fatty meal (e.g., ice cream or french fries).
Rinvoq Counseling: I discussed with the patient the risks of Rinvoq therapy including but not limited to upper respiratory tract infections, shingles, cold sores, bronchitis, nausea, cough, fever, acne, and headache. Live vaccines should be avoided.  This medication has been linked to serious infections; higher rate of mortality; malignancy and lymphoproliferative disorders; major adverse cardiovascular events; thrombosis; thrombocytopenia, anemia, and neutropenia; lipid elevations; liver enzyme elevations; and gastrointestinal perforations.
Dutasteride Pregnancy And Lactation Text: This medication is absolutely contraindicated in women, especially during pregnancy and breast feeding. Feminization of male fetuses is possible if taking while pregnant.
Ebglyss Counseling: I discussed with the patient the risks of lebrikizumab including but not limited to eye inflammation and irritation, cold sores, injection site reactions, allergic reactions and increased risk of parasitic infection. The patient understands that monitoring is required and they must alert us or the primary physician if symptoms of infection or other concerning signs are noted.
Rifampin Counseling: I discussed with the patient the risks of rifampin including but not limited to liver damage, kidney damage, red-orange body fluids, nausea/vomiting and severe allergy.
Azathioprine Pregnancy And Lactation Text: This medication is Pregnancy Category D and isn't considered safe during pregnancy. It is unknown if this medication is excreted in breast milk.
Rinvoq Pregnancy And Lactation Text: Based on animal studies, Rinvoq may cause embryo-fetal harm when administered to pregnant women.  The medication should not be used in pregnancy.  Breastfeeding is not recommended during treatment and for 6 days after the last dose.
Doxepin Pregnancy And Lactation Text: This medication is Pregnancy Category C and it isn't known if it is safe during pregnancy. It is also excreted in breast milk and breast feeding isn't recommended.
Thalidomide Counseling: I discussed with the patient the risks of thalidomide including but not limited to birth defects, anxiety, weakness, chest pain, dizziness, cough and severe allergy.
Taltz Counseling: I discussed with the patient the risks of ixekizumab including but not limited to immunosuppression, serious infections, worsening of inflammatory bowel disease and drug reactions.  The patient understands that monitoring is required including a PPD at baseline and must alert us or the primary physician if symptoms of infection or other concerning signs are noted.
Soolantra Counseling: I discussed with the patients the risks of topial Soolantra. This is a medicine which decreases the number of mites and inflammation in the skin. You experience burning, stinging, eye irritation or allergic reactions.  Please call our office if you develop any problems from using this medication.
Finasteride Male Counseling: Finasteride Counseling:  I discussed with the patient the risks of use of finasteride including but not limited to decreased libido, decreased ejaculate volume, gynecomastia, and depression. Women should not handle medication.  All of the patient's questions and concerns were addressed.
Ebglyss Pregnancy And Lactation Text: This medication likely crosses the placenta but the risk for the fetus is uncertain. It is unknown if this medication is excreted in breast milk.
Hydroxyzine Counseling: Patient advised that the medication is sedating and not to drive a car after taking this medication.  Patient informed of potential adverse effects including but not limited to dry mouth, urinary retention, and blurry vision.  The patient verbalized understanding of the proper use and possible adverse effects of hydroxyzine.  All of the patient's questions and concerns were addressed.
Cellcept Counseling:  I discussed with the patient the risks of mycophenolate mofetil including but not limited to infection/immunosuppression, GI upset, hypokalemia, hypercholesterolemia, bone marrow suppression, lymphoproliferative disorders, malignancy, GI ulceration/bleed/perforation, colitis, interstitial lung disease, kidney failure, progressive multifocal leukoencephalopathy, and birth defects.  The patient understands that monitoring is required including a baseline creatinine and regular CBC testing. In addition, patient must alert us immediately if symptoms of infection or other concerning signs are noted.
Griseofulvin Pregnancy And Lactation Text: This medication is Pregnancy Category X and is known to cause serious birth defects. It is unknown if this medication is excreted in breast milk but breast feeding should be avoided.
Infliximab Counseling:  I discussed with the patient the risks of infliximab including but not limited to myelosuppression, immunosuppression, autoimmune hepatitis, demyelinating diseases, lymphoma, and serious infections.  The patient understands that monitoring is required including a PPD at baseline and must alert us or the primary physician if symptoms of infection or other concerning signs are noted.
Isotretinoin Counseling: Patient should get monthly blood tests, not donate blood, not drive at night if vision affected, not share medication, and not undergo elective surgery for 6 months after tx completed. Side effects reviewed, pt to contact office should one occur.
Winlevi Pregnancy And Lactation Text: This medication is considered safe during pregnancy and breastfeeding.
Sotyktu Counseling:  I discussed the most common side effects of Sotyktu including: common cold, sore throat, sinus infections, cold sores, canker sores, folliculitis, and acne.? I also discussed more serious side effects of Sotyktu including but not limited to: serious allergic reactions; increased risk for infections such as TB; cancers such as lymphomas; rhabdomyolysis and elevated CPK; and elevated triglycerides and liver enzymes.?
Dapsone Counseling: I discussed with the patient the risks of dapsone including but not limited to hemolytic anemia, agranulocytosis, rashes, methemoglobinemia, kidney failure, peripheral neuropathy, headaches, GI upset, and liver toxicity.  Patients who start dapsone require monitoring including baseline LFTs and weekly CBCs for the first month, then every month thereafter.  The patient verbalized understanding of the proper use and possible adverse effects of dapsone.  All of the patient's questions and concerns were addressed.
Enbrel Counseling:  I discussed with the patient the risks of etanercept including but not limited to myelosuppression, immunosuppression, autoimmune hepatitis, demyelinating diseases, lymphoma, and infections.  The patient understands that monitoring is required including a PPD at baseline and must alert us or the primary physician if symptoms of infection or other concerning signs are noted.
Rifampin Pregnancy And Lactation Text: This medication is Pregnancy Category C and it isn't know if it is safe during pregnancy. It is also excreted in breast milk and should not be used if you are breast feeding.
Elidel Counseling: Patient may experience a mild burning sensation during topical application. Elidel is not approved in children less than 2 years of age. There have been case reports of hematologic and skin malignancies in patients using topical calcineurin inhibitors although causality is questionable.
Soolantra Pregnancy And Lactation Text: This medication is Pregnancy Category C. This medication is considered safe during breast feeding.
Wegovy Counseling: I reviewed the possible side effects including: thyroid tumors, kidney disease, gallbladder disease, abdominal pain, constipation, diarrhea, nausea, vomiting and pancreatitis. Do not take this medication if you have a history or family history of multiple endocrine neoplasia syndrome type 2. Side effects reviewed, pt to contact office should one occur.
Opioid Counseling: I discussed with the patient the potential side effects of opioids including but not limited to addiction, altered mental status, and depression. I stressed avoiding alcohol, benzodiazepines, muscle relaxants and sleep aids unless specifically okayed by a physician. The patient verbalized understanding of the proper use and possible adverse effects of opioids. All of the patient's questions and concerns were addressed. They were instructed to flush the remaining pills down the toilet if they did not need them for pain.
VTAMA Counseling: I discussed with the patient that VTAMA is not for use in the eyes, mouth or mouth. They should call the office if they develop any signs of allergic reactions to VTAMA. The patient verbalized understanding of the proper use and possible adverse effects of VTAMA.  All of the patient's questions and concerns were addressed.
Finasteride Female Counseling: Finasteride Counseling:  I discussed with the patient the risks of use of finasteride including but not limited to decreased libido and sexual dysfunction. Explained the teratogenic nature of the medication and stressed the importance of not getting pregnant during treatment. All of the patient's questions and concerns were addressed.
Sarecycline Counseling: Patient advised regarding possible photosensitivity and discoloration of the teeth, skin, lips, tongue and gums.  Patient instructed to avoid sunlight, if possible.  When exposed to sunlight, patients should wear protective clothing, sunglasses, and sunscreen.  The patient was instructed to call the office immediately if the following severe adverse effects occur:  hearing changes, easy bruising/bleeding, severe headache, or vision changes.  The patient verbalized understanding of the proper use and possible adverse effects of sarecycline.  All of the patient's questions and concerns were addressed.
Isotretinoin Pregnancy And Lactation Text: This medication is Pregnancy Category X and is considered extremely dangerous during pregnancy. It is unknown if it is excreted in breast milk.
Dapsone Pregnancy And Lactation Text: This medication is Pregnancy Category C and is not considered safe during pregnancy or breast feeding.
Hydroxyzine Pregnancy And Lactation Text: This medication is not safe during pregnancy and should not be taken. It is also excreted in breast milk and breast feeding isn't recommended.
Tranexamic Acid Counseling:  Patient advised of the small risk of bleeding problems with tranexamic acid. They were also instructed to call if they developed any nausea, vomiting or diarrhea. All of the patient's questions and concerns were addressed.
High Dose Vitamin A Counseling: Side effects reviewed, pt to contact office should one occur.
Topical Retinoid counseling:  Patient advised to apply a pea-sized amount only at bedtime and wait 30 minutes after washing their face before applying.  If too drying, patient may add a non-comedogenic moisturizer. The patient verbalized understanding of the proper use and possible adverse effects of retinoids.  All of the patient's questions and concerns were addressed.
Tremfya Counseling: I discussed with the patient the risks of guselkumab including but not limited to immunosuppression, serious infections, worsening of inflammatory bowel disease and drug reactions.  The patient understands that monitoring is required including a PPD at baseline and must alert us or the primary physician if symptoms of infection or other concerning signs are noted.
Cibinqo Counseling: I discussed with the patient the risks of Cibinqo therapy including but not limited to common cold, nausea, headache, cold sores, increased blood CPK levels, dizziness, UTIs, fatigue, acne, and vomitting. Live vaccines should be avoided.  This medication has been linked to serious infections; higher rate of mortality; malignancy and lymphoproliferative disorders; major adverse cardiovascular events; thrombosis; thrombocytopenia and lymphopenia; lipid elevations; and retinal detachment.
Opioid Pregnancy And Lactation Text: These medications can lead to premature delivery and should be avoided during pregnancy. These medications are also present in breast milk in small amounts.
Sotyktu Pregnancy And Lactation Text: There is insufficient data to evaluate whether or not Sotyktu is safe to use during pregnancy.? ?It is not known if Sotyktu passes into breast milk and whether or not it is safe to use when breastfeeding.??
Finasteride Pregnancy And Lactation Text: This medication is absolutely contraindicated during pregnancy. It is unknown if it is excreted in breast milk.
Libtayo Pregnancy And Lactation Text: This medication is contraindicated in pregnancy and when breast feeding.
Oxybutynin Counseling:  I discussed with the patient the risks of oxybutynin including but not limited to skin rash, drowsiness, dry mouth, difficulty urinating, and blurred vision.
Niacinamide Pregnancy And Lactation Text: These medications are considered safe during pregnancy.
Cimzia Counseling:  I discussed with the patient the risks of Cimzia including but not limited to immunosuppression, allergic reactions and infections.  The patient understands that monitoring is required including a PPD at baseline and must alert us or the primary physician if symptoms of infection or other concerning signs are noted.
Vtama Pregnancy And Lactation Text: It is unknown if this medication can cause problems during pregnancy and breastfeeding.
Topical Steroids Counseling: I discussed with the patient that prolonged use of topical steroids can result in the increased appearance of superficial blood vessels (telangiectasias), lightening (hypopigmentation) and thinning of the skin (atrophy).  Patient understands to avoid using high potency steroids in skin folds, the groin or the face.  The patient verbalized understanding of the proper use and possible adverse effects of topical steroids.  All of the patient's questions and concerns were addressed.
Skyrizi Counseling: I discussed with the patient the risks of risankizumab-rzaa including but not limited to immunosuppression, and serious infections.  The patient understands that monitoring is required including a PPD at baseline and must alert us or the primary physician if symptoms of infection or other concerning signs are noted.
Albendazole Counseling:  I discussed with the patient the risks of albendazole including but not limited to cytopenia, kidney damage, nausea/vomiting and severe allergy.  The patient understands that this medication is being used in an off-label manner.
Qbrexza Counseling:  I discussed with the patient the risks of Qbrexza including but not limited to headache, mydriasis, blurred vision, dry eyes, nasal dryness, dry mouth, dry throat, dry skin, urinary hesitation, and constipation.  Local skin reactions including erythema, burning, stinging, and itching can also occur.
Cimzia Pregnancy And Lactation Text: This medication crosses the placenta but can be considered safe in certain situations. Cimzia may be excreted in breast milk.
Nsaids Counseling: NSAID Counseling: I discussed with the patient that NSAIDs should be taken with food. Prolonged use of NSAIDs can result in the development of stomach ulcers.  Patient advised to stop taking NSAIDs if abdominal pain occurs.  The patient verbalized understanding of the proper use and possible adverse effects of NSAIDs.  All of the patient's questions and concerns were addressed.
Metronidazole Counseling:  I discussed with the patient the risks of metronidazole including but not limited to seizures, nausea/vomiting, a metallic taste in the mouth, nausea/vomiting and severe allergy.
Calcipotriene Counseling:  I discussed with the patient the risks of calcipotriene including but not limited to erythema, scaling, itching, and irritation.
Zoryve Counseling:  I discussed with the patient that Zoryve is not for use in the eyes, mouth or vagina. The most commonly reported side effects include diarrhea, headache, insomnia, application site pain, upper respiratory tract infections, and urinary tract infections.  All of the patient's questions and concerns were addressed.
Topical Steroids Applications Pregnancy And Lactation Text: Most topical steroids are considered safe to use during pregnancy and lactation.  Any topical steroid applied to the breast or nipple should be washed off before breastfeeding.
Odomzo Counseling- I discussed with the patient the risks of Odomzo including but not limited to nausea, vomiting, diarrhea, constipation, weight loss, changes in the sense of taste, decreased appetite, muscle spasms, and hair loss.  The patient verbalized understanding of the proper use and possible adverse effects of Odomzo.  All of the patient's questions and concerns were addressed.
Arava Counseling:  Patient counseled regarding adverse effects of Arava including but not limited to nausea, vomiting, abnormalities in liver function tests. Patients may develop mouth sores, rash, diarrhea, and abnormalities in blood counts. The patient understands that monitoring is required including LFTs and blood counts.  There is a rare possibility of scarring of the liver and lung problems that can occur when taking methotrexate. Persistent nausea, loss of appetite, pale stools, dark urine, cough, and shortness of breath should be reported immediately. Patient advised to discontinue Arava treatment and consult with a physician prior to attempting conception. The patient will have to undergo a treatment to eliminate Arava from the body prior to conception.
Qbrexza Pregnancy And Lactation Text: There is no available data on Qbrexza use in pregnant women.  There is no available data on Qbrexza use in lactation.
Mirvaso Counseling: Mirvaso is a topical medication which can decrease superficial blood flow where applied. Side effects are uncommon and include stinging, redness and allergic reactions.
Calcipotriene Pregnancy And Lactation Text: The use of this medication during pregnancy or lactation is not recommended as there is insufficient data.
Albendazole Pregnancy And Lactation Text: This medication is Pregnancy Category C and it isn't known if it is safe during pregnancy. It is also excreted in breast milk.
Metronidazole Pregnancy And Lactation Text: This medication is Pregnancy Category B and considered safe during pregnancy.  It is also excreted in breast milk.
Topical Sulfur Applications Counseling: Topical Sulfur Counseling: Patient counseled that this medication may cause skin irritation or allergic reactions.  In the event of skin irritation, the patient was advised to reduce the amount of the drug applied or use it less frequently.   The patient verbalized understanding of the proper use and possible adverse effects of topical sulfur application.  All of the patient's questions and concerns were addressed.
Cosentyx Counseling:  I discussed with the patient the risks of Cosentyx including but not limited to worsening of Crohn's disease, immunosuppression, allergic reactions and infections.  The patient understands that monitoring is required including a PPD at baseline and must alert us or the primary physician if symptoms of infection or other concerning signs are noted.
Nsaids Pregnancy And Lactation Text: These medications are considered safe up to 30 weeks gestation. It is excreted in breast milk.
Mirvaso Pregnancy And Lactation Text: This medication has not been assigned a Pregnancy Risk Category. It is unknown if the medication is excreted in breast milk.
Minocycline Counseling: Patient advised regarding possible photosensitivity and discoloration of the teeth, skin, lips, tongue and gums.  Patient instructed to avoid sunlight, if possible.  When exposed to sunlight, patients should wear protective clothing, sunglasses, and sunscreen.  The patient was instructed to call the office immediately if the following severe adverse effects occur:  hearing changes, easy bruising/bleeding, severe headache, or vision changes.  The patient verbalized understanding of the proper use and possible adverse effects of minocycline.  All of the patient's questions and concerns were addressed.
Propranolol Counseling:  I discussed with the patient the risks of propranolol including but not limited to low heart rate, low blood pressure, low blood sugar, restlessness and increased cold sensitivity. They should call the office if they experience any of these side effects.
Rhofade Counseling: Rhofade is a topical medication which can decrease superficial blood flow where applied. Side effects are uncommon and include stinging, redness and allergic reactions.
Ozempic Counseling: I reviewed the possible side effects including: thyroid tumors, kidney disease, gallbladder disease, abdominal pain, constipation, diarrhea, nausea, vomiting and pancreatitis. Do not take this medication if you have a history or family history of multiple endocrine neoplasia syndrome type 2. Side effects reviewed, pt to contact office should one occur.
Spevigo Counseling: I discussed with the patient the risks of Spevigo including but not limited to fatigue, nasuea, vomiting, headache, pruritus, urinary tract infection, an infusion related reactions.  The patient understands that monitoring is required including screening for tuberculosis at baseline and yearly screening thereafter while continuing Spevigo therapy. They should contact us if symptoms of infection or other concerning signs are noted.
Ivermectin Counseling:  Patient instructed to take medication on an empty stomach with a full glass of water.  Patient informed of potential adverse effects including but not limited to nausea, diarrhea, dizziness, itching, and swelling of the extremities or lymph nodes.  The patient verbalized understanding of the proper use and possible adverse effects of ivermectin.  All of the patient's questions and concerns were addressed.
Propranolol Pregnancy And Lactation Text: This medication is Pregnancy Category C and it isn't known if it is safe during pregnancy. It is excreted in breast milk.
Cantharidin Counseling:  I discussed with the patient the risks of Cantharidin including but not limited to pain, redness, burning, itching, and blistering.
Olanzapine Counseling- I discussed with the patient the common side effects of olanzapine including but are not limited to: lack of energy, dry mouth, increased appetite, sleepiness, tremor, constipation, dizziness, changes in behavior, or restlessness.  Explained that teenagers are more likely to experience headaches, abdominal pain, pain in the arms or legs, tiredness, and sleepiness.  Serious side effects include but are not limited: increased risk of death in elderly patients who are confused, have memory loss, or dementia-related psychosis; hyperglycemia; increased cholesterol and triglycerides; and weight gain.
Cimetidine Counseling:  I discussed with the patient the risks of Cimetidine including but not limited to gynecomastia, headache, diarrhea, nausea, drowsiness, arrhythmias, pancreatitis, skin rashes, psychosis, bone marrow suppression and kidney toxicity.
Hyrimoz Counseling:  I discussed with the patient the risks of adalimumab including but not limited to myelosuppression, immunosuppression, autoimmune hepatitis, demyelinating diseases, lymphoma, and serious infections.  The patient understands that monitoring is required including a PPD at baseline and must alert us or the primary physician if symptoms of infection or other concerning signs are noted.
Acitretin Counseling:  I discussed with the patient the risks of acitretin including but not limited to hair loss, dry lips/skin/eyes, liver damage, hyperlipidemia, depression/suicidal ideation, photosensitivity.  Serious rare side effects can include but are not limited to pancreatitis, pseudotumor cerebri, bony changes, clot formation/stroke/heart attack.  Patient understands that alcohol is contraindicated since it can result in liver toxicity and significantly prolong the elimination of the drug by many years.
Topical Sulfur Applications Pregnancy And Lactation Text: This medication is Pregnancy Category C and has an unknown safety profile during pregnancy. It is unknown if this topical medication is excreted in breast milk.
Spevigo Pregnancy And Lactation Text: The risk during pregnancy and breastfeeding is uncertain with this medication. This medication does cross the placenta. It is unknown if this medication is found in breast milk.
Clofazimine Counseling:  I discussed with the patient the risks of clofazimine including but not limited to skin and eye pigmentation, liver damage, nausea/vomiting, gastrointestinal bleeding and allergy.
Fluconazole Counseling:  Patient counseled regarding adverse effects of fluconazole including but not limited to headache, diarrhea, nausea, upset stomach, liver function test abnormalities, taste disturbance, and stomach pain.  There is a rare possibility of liver failure that can occur when taking fluconazole.  The patient understands that monitoring of LFTs and kidney function test may be required, especially at baseline. The patient verbalized understanding of the proper use and possible adverse effects of fluconazole.  All of the patient's questions and concerns were addressed.
Opzelura Counseling:  I discussed with the patient the risks of Opzelura including but not limited to nasopharngitis, bronchitis, ear infection, eosinophila, hives, diarrhea, folliculitis, tonsillitis, and rhinorrhea.  Taken orally, this medication has been linked to serious infections; higher rate of mortality; malignancy and lymphoproliferative disorders; major adverse cardiovascular events; thrombosis; thrombocytopenia, anemia, and neutropenia; and lipid elevations.
Erivedge Counseling- I discussed with the patient the risks of Erivedge including but not limited to nausea, vomiting, diarrhea, constipation, weight loss, changes in the sense of taste, decreased appetite, muscle spasms, and hair loss.  The patient verbalized understanding of the proper use and possible adverse effects of Erivedge.  All of the patient's questions and concerns were addressed.
Olumiant Counseling: I discussed with the patient the risks of Olumiant therapy including but not limited to upper respiratory tract infections, shingles, cold sores, and nausea. Live vaccines should be avoided.  This medication has been linked to serious infections; higher rate of mortality; malignancy and lymphoproliferative disorders; major adverse cardiovascular events; thrombosis; gastrointestinal perforations; neutropenia; lymphopenia; anemia; liver enzyme elevations; and lipid elevations.
Saxenda Counseling: I reviewed the possible side effects including: thyroid tumors, kidney disease, gallbladder disease, abdominal pain, constipation, diarrhea, nausea, vomiting and pancreatitis. Do not take this medication if you have a history or family history of multiple endocrine neoplasia syndrome type 2. Side effects reviewed, pt to contact office should one occur.
Dupixent Counseling: I discussed with the patient the risks of dupilumab including but not limited to eye infection and irritation, cold sores, injection site reactions, worsening of asthma, allergic reactions and increased risk of parasitic infection.  Live vaccines should be avoided while taking dupilumab. Dupilumab will also interact with certain medications such as warfarin and cyclosporine. The patient understands that monitoring is required and they must alert us or the primary physician if symptoms of infection or other concerning signs are noted.
5-Fu Counseling: 5-Fluorouracil Counseling:  I discussed with the patient the risks of 5-fluorouracil including but not limited to erythema, scaling, itching, weeping, crusting, and pain.
Wartpeel Counseling:  I discussed with the patient the risks of Wartpeel including but not limited to erythema, scaling, itching, weeping, crusting, and pain.
Dutasteride Male Counseling: Dustasteride Counseling:  I discussed with the patient the risks of use of dutasteride including but not limited to decreased libido, decreased ejaculate volume, and gynecomastia. Women who can become pregnant should not handle medication.  All of the patient's questions and concerns were addressed.
Acitretin Pregnancy And Lactation Text: This medication is Pregnancy Category X and should not be given to women who are pregnant or may become pregnant in the future. This medication is excreted in breast milk.
Opzelura Pregnancy And Lactation Text: There is insufficient data to evaluate drug-associated risk for major birth defects, miscarriage, or other adverse maternal or fetal outcomes.  There is a pregnancy registry that monitors pregnancy outcomes in pregnant persons exposed to the medication during pregnancy.  It is unknown if this medication is excreted in breast milk.  Do not breastfeed during treatment and for about 4 weeks after the last dose.
SSKI Counseling:  I discussed with the patient the risks of SSKI including but not limited to thyroid abnormalities, metallic taste, GI upset, fever, headache, acne, arthralgias, paraesthesias, lymphadenopathy, easy bleeding, arrhythmias, and allergic reaction.

## 2024-12-17 NOTE — PROCEDURE: PRESCRIPTION MEDICATION MANAGEMENT
Detail Level: Zone
Render In Strict Bullet Format?: No
Initiate Treatment: Triamcinolone cream twice daily for two weeks.

## 2025-02-04 ENCOUNTER — OFFICE VISIT (OUTPATIENT)
Dept: URGENT CARE | Facility: PHYSICIAN GROUP | Age: 80
End: 2025-02-04
Payer: MEDICARE

## 2025-02-04 VITALS
SYSTOLIC BLOOD PRESSURE: 126 MMHG | TEMPERATURE: 97.6 F | RESPIRATION RATE: 16 BRPM | HEART RATE: 93 BPM | BODY MASS INDEX: 30.86 KG/M2 | HEIGHT: 66 IN | OXYGEN SATURATION: 93 % | DIASTOLIC BLOOD PRESSURE: 64 MMHG | WEIGHT: 192 LBS

## 2025-02-04 DIAGNOSIS — J32.9 BACTERIAL SINUSITIS: ICD-10-CM

## 2025-02-04 DIAGNOSIS — B96.89 BACTERIAL SINUSITIS: ICD-10-CM

## 2025-02-04 PROCEDURE — 3078F DIAST BP <80 MM HG: CPT | Performed by: FAMILY MEDICINE

## 2025-02-04 PROCEDURE — 3074F SYST BP LT 130 MM HG: CPT | Performed by: FAMILY MEDICINE

## 2025-02-04 PROCEDURE — 99213 OFFICE O/P EST LOW 20 MIN: CPT | Performed by: FAMILY MEDICINE

## 2025-02-04 RX ORDER — BENZONATATE 100 MG/1
100 CAPSULE ORAL 3 TIMES DAILY PRN
Qty: 20 CAPSULE | Refills: 0 | Status: SHIPPED | OUTPATIENT
Start: 2025-02-04

## 2025-02-04 NOTE — PROGRESS NOTES
"  Subjective:      79 y.o. female presents to urgent care for cold symptoms that started about 10 days ago. She is experiencing increased sinus pressure, nasal congestion, sore throat, and cough. No tobacco product use.  She does have asthma for which she uses Symbicort and albuterol as needed.  She has not been using her albuterol anymore since getting sick.  She is vaccinated against COVID. No known sick contacts.     She denies any other questions or concerns at this time.    Current problem list, medication, and past medical/surgical history were reviewed in Epic.    ROS  See HPI     Objective:      /64   Pulse 93   Temp 36.4 °C (97.6 °F)   Resp 16   Ht 1.676 m (5' 6\")   Wt 87.1 kg (192 lb)   SpO2 93%   BMI 30.99 kg/m²     Physical Exam  Constitutional:       General: She is not in acute distress.     Appearance: She is not diaphoretic.   HENT:      Right Ear: Tympanic membrane, ear canal and external ear normal.      Left Ear: Tympanic membrane, ear canal and external ear normal.      Nose:      Right Sinus: Frontal sinus tenderness present. No maxillary sinus tenderness.      Left Sinus: Frontal sinus tenderness present. No maxillary sinus tenderness.      Mouth/Throat:      Tongue: Tongue does not deviate from midline.      Palate: No lesions.      Pharynx: No oropharyngeal exudate or posterior oropharyngeal erythema.      Tonsils: No tonsillar exudate. 1+ on the right. 1+ on the left.   Cardiovascular:      Rate and Rhythm: Normal rate and regular rhythm.      Heart sounds: Normal heart sounds.   Pulmonary:      Effort: Pulmonary effort is normal. No respiratory distress.      Breath sounds: Normal breath sounds.   Neurological:      Mental Status: She is alert.   Psychiatric:         Mood and Affect: Affect normal.         Judgment: Judgment normal.       Assessment/Plan:     1. Bacterial sinusitis  Criteria for bacterial sinusitis has been met.  Prescription for Augmentin has been sent.  " Tylenol, ibuprofen, and Flonase as needed for symptomatic relief.  - amoxicillin-clavulanate (AUGMENTIN) 875-125 MG Tab; Take 1 Tablet by mouth 2 times a day for 7 days.  Dispense: 14 Tablet; Refill: 0  - benzonatate (TESSALON) 100 MG Cap; Take 1 Capsule by mouth 3 times a day as needed for Cough.  Dispense: 20 Capsule; Refill: 0      Instructed to return to Urgent Care or nearest Emergency Department if symptoms fail to improve, for any change in condition, further concerns, or new concerning symptoms. Patient states understanding of the plan of care and discharge instructions.    Elmira Saha M.D.

## 2025-04-02 ENCOUNTER — APPOINTMENT (OUTPATIENT)
Dept: URBAN - METROPOLITAN AREA CLINIC 22 | Facility: CLINIC | Age: 80
Setting detail: DERMATOLOGY
End: 2025-04-02

## 2025-04-02 DIAGNOSIS — L20.89 OTHER ATOPIC DERMATITIS: ICD-10-CM | Status: INADEQUATELY CONTROLLED

## 2025-04-02 PROCEDURE — ? PRESCRIPTION

## 2025-04-02 PROCEDURE — ? ADDITIONAL NOTES

## 2025-04-02 PROCEDURE — ? COUNSELING

## 2025-04-02 PROCEDURE — ? TREATMENT REGIMEN

## 2025-04-02 PROCEDURE — 96372 THER/PROPH/DIAG INJ SC/IM: CPT

## 2025-04-02 PROCEDURE — ? MEDICATION COUNSELING

## 2025-04-02 PROCEDURE — ? INTRAMUSCULAR KENALOG

## 2025-04-02 PROCEDURE — 99214 OFFICE O/P EST MOD 30 MIN: CPT | Mod: 25

## 2025-04-02 RX ORDER — TACROLIMUS 1 MG/G
OINTMENT TOPICAL
Qty: 60 | Refills: 0 | Status: ERX | COMMUNITY
Start: 2025-04-02

## 2025-04-02 RX ORDER — TRIAMCINOLONE ACETONIDE 1 MG/G
CREAM TOPICAL BID
Qty: 453.6 | Refills: 2 | Status: ERX

## 2025-04-02 RX ADMIN — TACROLIMUS: 1 OINTMENT TOPICAL at 00:00

## 2025-04-02 ASSESSMENT — ITCH NUMERIC RATING SCALE: HOW SEVERE IS YOUR ITCHING?: 5

## 2025-04-02 ASSESSMENT — BSA ECZEMA: % BODY COVERED IN ECZEMA: 24

## 2025-04-02 ASSESSMENT — LOCATION SIMPLE DESCRIPTION DERM
LOCATION SIMPLE: RIGHT BUTTOCK
LOCATION SIMPLE: UPPER BACK
LOCATION SIMPLE: RIGHT PRETIBIAL REGION
LOCATION SIMPLE: LOWER BACK
LOCATION SIMPLE: RIGHT THIGH
LOCATION SIMPLE: LEFT PRETIBIAL REGION
LOCATION SIMPLE: LEFT THIGH
LOCATION SIMPLE: LEFT POSTERIOR UPPER ARM
LOCATION SIMPLE: RIGHT POSTERIOR UPPER ARM

## 2025-04-02 ASSESSMENT — LOCATION DETAILED DESCRIPTION DERM
LOCATION DETAILED: INFERIOR LUMBAR SPINE
LOCATION DETAILED: RIGHT BUTTOCK
LOCATION DETAILED: SUPERIOR THORACIC SPINE
LOCATION DETAILED: LEFT PROXIMAL PRETIBIAL REGION
LOCATION DETAILED: LEFT PROXIMAL POSTERIOR UPPER ARM
LOCATION DETAILED: RIGHT ANTERIOR PROXIMAL THIGH
LOCATION DETAILED: RIGHT PROXIMAL PRETIBIAL REGION
LOCATION DETAILED: RIGHT PROXIMAL POSTERIOR UPPER ARM
LOCATION DETAILED: LEFT ANTERIOR PROXIMAL THIGH

## 2025-04-02 ASSESSMENT — LOCATION ZONE DERM
LOCATION ZONE: LEG
LOCATION ZONE: ARM
LOCATION ZONE: TRUNK

## 2025-04-02 ASSESSMENT — SEVERITY ASSESSMENT 2020: SEVERITY 2020: MODERATE

## 2025-05-02 ENCOUNTER — APPOINTMENT (OUTPATIENT)
Dept: MEDICAL GROUP | Facility: MEDICAL CENTER | Age: 80
End: 2025-05-02
Payer: MEDICARE

## 2025-05-02 VITALS
TEMPERATURE: 97 F | RESPIRATION RATE: 18 BRPM | DIASTOLIC BLOOD PRESSURE: 58 MMHG | BODY MASS INDEX: 29.86 KG/M2 | HEART RATE: 83 BPM | HEIGHT: 66 IN | WEIGHT: 185.8 LBS | SYSTOLIC BLOOD PRESSURE: 122 MMHG

## 2025-05-02 DIAGNOSIS — Z79.899 CHRONIC USE OF BENZODIAZEPINE FOR THERAPEUTIC PURPOSE: ICD-10-CM

## 2025-05-02 DIAGNOSIS — E11.65 TYPE 2 DIABETES MELLITUS WITH HYPERGLYCEMIA, WITHOUT LONG-TERM CURRENT USE OF INSULIN (HCC): ICD-10-CM

## 2025-05-02 DIAGNOSIS — K21.9 GASTROESOPHAGEAL REFLUX DISEASE, UNSPECIFIED WHETHER ESOPHAGITIS PRESENT: ICD-10-CM

## 2025-05-02 DIAGNOSIS — F41.9 CHRONIC ANXIETY: ICD-10-CM

## 2025-05-02 DIAGNOSIS — I10 ESSENTIAL HYPERTENSION: ICD-10-CM

## 2025-05-02 DIAGNOSIS — Z63.79 STRESS DUE TO ILLNESS OF FAMILY MEMBER: ICD-10-CM

## 2025-05-02 DIAGNOSIS — Z13.31 POSITIVE DEPRESSION SCREENING: ICD-10-CM

## 2025-05-02 DIAGNOSIS — E78.5 DYSLIPIDEMIA: ICD-10-CM

## 2025-05-02 DIAGNOSIS — F41.9 ANXIETY: ICD-10-CM

## 2025-05-02 LAB
HBA1C MFR BLD: 7 % (ref ?–5.8)
POCT INT CON NEG: NEGATIVE
POCT INT CON POS: POSITIVE

## 2025-05-02 PROCEDURE — 83036 HEMOGLOBIN GLYCOSYLATED A1C: CPT | Performed by: NURSE PRACTITIONER

## 2025-05-02 PROCEDURE — 99214 OFFICE O/P EST MOD 30 MIN: CPT | Performed by: NURSE PRACTITIONER

## 2025-05-02 PROCEDURE — 3074F SYST BP LT 130 MM HG: CPT | Performed by: NURSE PRACTITIONER

## 2025-05-02 PROCEDURE — 3078F DIAST BP <80 MM HG: CPT | Performed by: NURSE PRACTITIONER

## 2025-05-02 RX ORDER — ALPRAZOLAM 0.5 MG
0.5 TABLET ORAL
Qty: 30 TABLET | Refills: 5 | Status: SHIPPED | OUTPATIENT
Start: 2025-05-02 | End: 2025-06-01

## 2025-05-02 ASSESSMENT — PATIENT HEALTH QUESTIONNAIRE - PHQ9
5. POOR APPETITE OR OVEREATING: 1 - SEVERAL DAYS
CLINICAL INTERPRETATION OF PHQ2 SCORE: 2
SUM OF ALL RESPONSES TO PHQ QUESTIONS 1-9: 10

## 2025-05-02 NOTE — PROGRESS NOTES
Subjective:     Judy Bland is a 79 y.o. female presents to discuss:     Chief Complaint   Patient presents with    Follow-Up     Verbal consent was acquired by the patient to use [employer] ambient listening note generation during this visit Yes   History of Present Illness  The patient presents for 6-month follow-up, medication refill     Diabetes Mellitus  - A decrease in A1c levels from 7.8 to 7 is reported, attributed to dietary changes, including the consumption of cake.  - She experiences a lack of appetite in the morning, often delaying her first meal until 2 or 3 PM, but does not report any hypoglycemic symptoms.  - Weight loss has been noted.    Eczema  - A diagnosis of eczema was made by a dermatologist, with lesions present on her feet and other body parts.  - This is her first experience with this condition.  - The onset of eczema is speculated to be related to a recent stressful event involving her partner's health.  - The presence of sores resulting from accidental collisions with objects is also mentioned.    Coughing Spells  - Coughing spells continue to be experienced, believed to be triggered by the consumption of water and milk.  - No changes or worsening of these symptoms are noted-since last OV    Supplemental information: The last colonoscopy revealed 7 polyps, and the most recent endoscopy was performed 10 to 12 years ago, during which dilation was performed.    Nightly oxygen therapy and CPAP use continue, with no changes in this regimen.    An appointment with pulmonology was attended, and another appointment and scoping were missed and need to be rescheduled.    A refill of the Xanax prescription is requested.    Lab Results   Component Value Date/Time    HBA1C 7.0 (A) 05/02/2025 1035    HBA1C 7.8 (H) 10/30/2024 0931    HBA1C 7.8 (A) 05/02/2024 1028    HBA1C 7.2 (H) 09/21/2023 0834          ROS: : see above      Current Outpatient Medications:     ALPRAZolam (XANAX) 0.5 MG Tab,  Take 1 Tablet by mouth one time as needed for Anxiety for up to 30 days., Disp: 30 Tablet, Rfl: 5    budesonide-formoterol (SYMBICORT) 160-4.5 MCG/ACT Aerosol, USE 2 INHALATIONS ORALLY   TWICE DAILY WITH SPACER,   RINSE MOUTH AFTER EACH USE., Disp: 3 Each, Rfl: 3    propranolol (INDERAL) 20 MG Tab, TAKE ONE TABLET BY MOUTH EVERY NIGHT AT BEDTIME, Disp: 90 Tablet, Rfl: 3    dilTIAZem (CARDIZEM) 60 MG Tab, Take 1 Tablet by mouth 2 times a day., Disp: 200 Tablet, Rfl: 3    lisinopril-hydrochlorothiazide (PRINZIDE) 20-12.5 MG per tablet, TAKE ONE TABLET BY MOUTH EVERY MORNING, Disp: 90 Tablet, Rfl: 3    montelukast (SINGULAIR) 10 MG Tab, Take 1 Tablet by mouth every day., Disp: 90 Tablet, Rfl: 3    albuterol (VENTOLIN HFA) 108 (90 Base) MCG/ACT Aero Soln inhalation aerosol, Inhale 2 Puffs every four hours as needed for Shortness of Breath., Disp: 1 Each, Rfl: 11    atorvastatin (LIPITOR) 20 MG Tab, Take 1 Tablet by mouth at bedtime., Disp: 90 Tablet, Rfl: 3    metFORMIN ER (GLUCOPHAGE XR) 500 MG TABLET SR 24 HR, Take 3 Tablets by mouth every day., Disp: 270 Tablet, Rfl: 3    pantoprazole (PROTONIX) 40 MG Tablet Delayed Response, Take 1 Tablet by mouth 2 times a day., Disp: 180 Tablet, Rfl: 3    meloxicam (MOBIC) 15 MG tablet, Take 1 Tablet by mouth every day., Disp: 30 Tablet, Rfl: 0    fluticasone (FLONASE) 50 MCG/ACT nasal spray, USE 1 SPRAY IN EACH NOSTRILTWICE DAILY, Disp: 48 g, Rfl: 3    triamcinolone acetonide (KENALOG) 0.1 % Cream, Apply  topically as needed., Disp: , Rfl:     nystatin (MYCOSTATIN) 564526 UNIT/GM Cream topical cream, Apply 1 Application topically as needed., Disp: , Rfl:     meloxicam (MOBIC) 15 MG tablet, Take 15 mg by mouth every day., Disp: , Rfl:     RESTASIS 0.05 % ophthalmic emulsion, Place 1 Drop in both eyes 2 times a day., Disp: , Rfl:     Cetirizine HCl (ZYRTEC PO), Take 1 Each by mouth as needed., Disp: , Rfl:     Allergies   Allergen Reactions    Ultram [Tramadol Hcl]      ELEVATED  "LIVER ENZYMES       Objective:   Vitals: /58   Pulse 83   Temp 36.1 °C (97 °F) (Temporal)   Resp 18   Ht 1.676 m (5' 6\")   Wt 84.3 kg (185 lb 12.8 oz)   BMI 29.99 kg/m²    General: Alert, pleasant, NAD  HEENT: Normocephalic.  Neck supple.   Respiratory: no distress, no audible wheezing, RR -WNL  Skin: Warm, dry, no rashes.  Extremities: No leg edema. No discoloration  Neurological: No tremors  Psych:  Affect/mood is normal, judgement is good, memory is intact, grooming is appropriate.  Assessment/Plan:      1. Type 2 diabetes mellitus with hyperglycemia, without long-term current use of insulin (HCC)  Chronic. Improved. A1c 7.0% down from 7.8%.   Continue Metformin 1500mg XR daily.    Due for routine labs next office visit.  - POCT Hemoglobin A1C  - POCT Retinal Eye Exam  - Comp Metabolic Panel; Future  - MICROALBUMIN CREAT RATIO URINE; Future  - HEMOGLOBIN A1C; Future    2. Essential hypertension  Chronic, stable.  Most recent GFR 85.  She will continue lisinopril/HCTZ  and Diltiazem 60mg BID.   Due for routine labs next OV  - Comp Metabolic Panel; Future  - MICROALBUMIN CREAT RATIO URINE; Future  - CBC WITHOUT DIFFERENTIAL; Future    3. Dyslipidemia  Chronic. Lipid panel has previously been stable with statin therapy. LDL < 70. Last LFTs within normal   Today for routine labs next office visit chronic.  A1c has improved now at 7.0% down from 7.8%.  To continue metformin  - Lipid Profile; Future  - Comp Metabolic Panel; Future  - TSH; Future    4. Chronic use of benzodiazepine for therapeutic purpose  -In the setting of ongoing Xanax use.    5. Chronic anxiety  6. Stress due to illness of family member  7. Anxiety  Chronic, ongoing. Patient continues to be her husbands primary caregiver as he has Alzheimer's in which over the last year he has shown progressive decline in his health, he now has expressive aphasia after suspected stroke, he has had some behavior changes, he more recently wondered off and " was found several hours later after walking home from [facility]. She is his primary caregiver. She does have difficult time with sleeping as well as anxiety and is Xanax has been helpful for managing these symptoms. Denies any adverse side effects.  checked. No inconsistencies. Have provided refills and she will follow-up in 6 months for ongoing refills.   - ALPRAZolam (XANAX) 0.5 MG Tab; Take 1 Tablet by mouth one time as needed for Anxiety for up to 30 days.  Dispense: 30 Tablet; Refill: 5    8. Gastroesophageal reflux disease, unspecified whether esophagitis present  Chronic. She has been taking Pantoprazole 40mg BID as QD is not helpful at controlling her symptoms.   Continue Pantoprazole   - CBC WITHOUT DIFFERENTIAL; Future    9. Positive depression screening     Assessment & Plan  Diabetes Mellitus  A1c level has improved to 7.0 from 7.8 in 10/2024.  Treatment plan: Advised to continue current management plan as it is effectively controlling blood sugar levels. Encouraged to maintain weight loss and healthy eating habits.    Eczema  Reports new onset eczema, possibly related to stress. Consulted with Dermatology.     Coughing spells  Continues to experience coughing spells as discussed on last OV, particularly with thin liquids. Hx of esophageal dilation.   Treatment plan: Advised to avoid small bites, eat slowly, and sit upright while eating to prevent choking and aspiration. Reviewed previous endoscopy and dilation treatment, no worsening symptoms reported since last OV.    Medication Management  Prescription refill for Xanax provided. Prescription drug monitoring program confirmed receipt of controlled substance refill.      Return in about 6 months (around 11/2/2025).    {I have placed the above orders and discussed them with an approved delegating provider. The MA is performing the below orders under the direction of Dr. Michael SIDHU.    Health maintenance:    General  Recommendations:   Smoking: recommend complete avoidance of all tobacco products  Alcohol: recommend limiting consumption  Physical Activity: goal is 30 min of moderate activity 5 times a week  Weight Management and Nutrition: high vegetable, high protein diet like the Mediterranean diet, portion control, avoid excessive sugars, Low Glycemic Index foods, adequate hydration, sleep.

## 2025-06-10 ENCOUNTER — RESULTS FOLLOW-UP (OUTPATIENT)
Dept: MEDICAL GROUP | Facility: MEDICAL CENTER | Age: 80
End: 2025-06-10

## 2025-06-26 DIAGNOSIS — E11.65 TYPE 2 DIABETES MELLITUS WITH HYPERGLYCEMIA, WITHOUT LONG-TERM CURRENT USE OF INSULIN (HCC): ICD-10-CM

## 2025-06-26 DIAGNOSIS — K21.9 GASTROESOPHAGEAL REFLUX DISEASE, UNSPECIFIED WHETHER ESOPHAGITIS PRESENT: Chronic | ICD-10-CM

## 2025-06-26 DIAGNOSIS — E78.5 DYSLIPIDEMIA: ICD-10-CM

## 2025-06-28 RX ORDER — ATORVASTATIN CALCIUM 20 MG/1
20 TABLET, FILM COATED ORAL
Qty: 90 TABLET | Refills: 3 | Status: SHIPPED | OUTPATIENT
Start: 2025-06-28

## 2025-06-28 RX ORDER — METFORMIN HYDROCHLORIDE 500 MG/1
TABLET, EXTENDED RELEASE ORAL
Qty: 270 TABLET | Refills: 3 | Status: SHIPPED | OUTPATIENT
Start: 2025-06-28

## 2025-06-28 RX ORDER — PANTOPRAZOLE SODIUM 40 MG/1
TABLET, DELAYED RELEASE ORAL
Qty: 180 TABLET | Refills: 3 | Status: SHIPPED | OUTPATIENT
Start: 2025-06-28